# Patient Record
Sex: FEMALE | Race: WHITE | Employment: OTHER | URBAN - METROPOLITAN AREA
[De-identification: names, ages, dates, MRNs, and addresses within clinical notes are randomized per-mention and may not be internally consistent; named-entity substitution may affect disease eponyms.]

---

## 2017-08-08 ENCOUNTER — HOSPITAL ENCOUNTER (OUTPATIENT)
Dept: NON INVASIVE DIAGNOSTICS | Age: 82
Discharge: HOME | End: 2017-08-08
Payer: MEDICARE

## 2017-08-08 VITALS
HEART RATE: 58 BPM | SYSTOLIC BLOOD PRESSURE: 119 MMHG | BODY MASS INDEX: 31.83 KG/M2 | HEIGHT: 68 IN | RESPIRATION RATE: 16 BRPM | OXYGEN SATURATION: 98 % | DIASTOLIC BLOOD PRESSURE: 48 MMHG | WEIGHT: 210 LBS

## 2017-08-08 DIAGNOSIS — I48.91 ATRIAL FIBRILLATION (HCC): ICD-10-CM

## 2017-08-08 LAB
ATRIAL RATE: 57 BPM
CALCULATED P AXIS, ECG09: 77 DEGREES
CALCULATED R AXIS, ECG10: -10 DEGREES
CALCULATED T AXIS, ECG11: 51 DEGREES
DIAGNOSIS, 93000: NORMAL
P-R INTERVAL, ECG05: 204 MS
Q-T INTERVAL, ECG07: 444 MS
QRS DURATION, ECG06: 104 MS
QTC CALCULATION (BEZET), ECG08: 432 MS
VENTRICULAR RATE, ECG03: 57 BPM

## 2017-08-08 PROCEDURE — 92960 CARDIOVERSION ELECTRIC EXT: CPT

## 2017-08-08 PROCEDURE — 99152 MOD SED SAME PHYS/QHP 5/>YRS: CPT

## 2017-08-08 PROCEDURE — 77030018729 HC ELECTRD DEFIB PAD CARD -B

## 2017-08-08 PROCEDURE — 74011250636 HC RX REV CODE- 250/636

## 2017-08-08 PROCEDURE — 99153 MOD SED SAME PHYS/QHP EA: CPT

## 2017-08-08 PROCEDURE — 93005 ELECTROCARDIOGRAM TRACING: CPT

## 2017-08-08 RX ORDER — MIDAZOLAM HYDROCHLORIDE 1 MG/ML
.5-2 INJECTION, SOLUTION INTRAMUSCULAR; INTRAVENOUS
Status: DISCONTINUED | OUTPATIENT
Start: 2017-08-08 | End: 2017-08-08 | Stop reason: ALTCHOICE

## 2017-08-08 RX ORDER — ACETAMINOPHEN 500 MG
2000 TABLET ORAL DAILY
COMMUNITY

## 2017-08-08 RX ORDER — FENTANYL CITRATE 50 UG/ML
25-50 INJECTION, SOLUTION INTRAMUSCULAR; INTRAVENOUS
Status: DISCONTINUED | OUTPATIENT
Start: 2017-08-08 | End: 2017-08-08 | Stop reason: ALTCHOICE

## 2017-08-08 RX ORDER — MIDAZOLAM HYDROCHLORIDE 1 MG/ML
INJECTION, SOLUTION INTRAMUSCULAR; INTRAVENOUS
Status: COMPLETED
Start: 2017-08-08 | End: 2017-08-08

## 2017-08-08 RX ORDER — FENTANYL CITRATE 50 UG/ML
INJECTION, SOLUTION INTRAMUSCULAR; INTRAVENOUS
Status: COMPLETED
Start: 2017-08-08 | End: 2017-08-08

## 2017-08-08 RX ORDER — METOPROLOL TARTRATE 25 MG/1
25 TABLET, FILM COATED ORAL DAILY
COMMUNITY
End: 2017-08-08

## 2017-08-08 RX ADMIN — MIDAZOLAM HYDROCHLORIDE 1 MG: 1 INJECTION, SOLUTION INTRAMUSCULAR; INTRAVENOUS at 10:16

## 2017-08-08 RX ADMIN — FENTANYL CITRATE 50 MCG: 50 INJECTION, SOLUTION INTRAMUSCULAR; INTRAVENOUS at 10:11

## 2017-08-08 RX ADMIN — MIDAZOLAM HYDROCHLORIDE 2 MG: 1 INJECTION, SOLUTION INTRAMUSCULAR; INTRAVENOUS at 10:11

## 2017-08-08 NOTE — PROGRESS NOTES
Patient arrived to Non-Invasive Cardiology Lab for Out Patient Procedure. Staff introduced to patient. Patient identifiers verified with Name and Date of Birth. Procedure verified with patient. Consent forms reviewed and signed by patient or authorized representative and verified. Allergies verified. Patient informed of procedure and plan of care. Questions answered with review. Patient on cardiac monitor, non-invasive blood pressure, SPO2 monitor. On RA. Patient is A&Ox3. Patient reports no complaints. Patient on stretcher, in low position, with side rails up. Patient instructed to call for assistance as needed. Family in waiting room.  ASA 3  Mallampati  3  Per MD

## 2017-08-08 NOTE — IP AVS SNAPSHOT
[address] [facility] 
[phone] Patient: Parvin Valle MRN: [id number] :1929 Current Discharge Medication List  
  
CONTINUE these medications which have NOT CHANGED Dose & Instructions Dispensing Information Comments Morning Noon Evening Bedtime  
 amiodarone 100 mg tablet Commonly known as:  William Velasquez Your last dose was: Your next dose is:    
   
   
 Dose:  100 mg Take 1 Tab by mouth daily. Quantity:  30 Tab Refills:  2  
     
   
   
   
  
 bumetanide 1 mg tablet Commonly known as:  Cony Carry Your last dose was: Your next dose is:    
   
   
 Dose:  1 mg Take 1 Tab by mouth daily. Quantity:  30 Tab Refills:  2  
     
   
   
   
  
 hydrALAZINE 50 mg tablet Commonly known as:  APRESOLINE Your last dose was: Your next dose is:    
   
   
 Dose:  50 mg Take 50 mg by mouth two (2) times a day. Refills:  0 LIPITOR 20 mg tablet Generic drug:  atorvastatin Your last dose was: Your next dose is:    
   
   
 Dose:  40 mg Take 40 mg by mouth nightly. Refills:  0  
     
   
   
   
  
 losartan 100 mg tablet Commonly known as:  COZAAR Your last dose was: Your next dose is:    
   
   
 Dose:  100 mg Take 100 mg by mouth daily. Refills:  0  
     
   
   
   
  
 nitroglycerin 0.4 mg SL tablet Commonly known as:  NITROSTAT Your last dose was: Your next dose is:    
   
   
 Dose:  0.4 mg  
0.4 mg by SubLINGual route every five (5) minutes as needed for Chest Pain. Refills:  0  
     
   
   
   
  
 VITAMIN D3 2,000 unit Cap capsule Generic drug:  Cholecalciferol (Vitamin D3) Your last dose was: Your next dose is:    
   
   
 Dose:  2000 Units Take 2,000 Units by mouth daily. Refills:  0 XARELTO 20 mg Tab tablet Generic drug:  rivaroxaban Your last dose was: Your next dose is:    
   
   
 Dose:  20 mg Take 20 mg by mouth daily. Refills:  0 STOP taking these medications   
 metoprolol tartrate 25 mg tablet Commonly known as:  LOPRESSOR

## 2017-08-08 NOTE — IP AVS SNAPSHOT
[address] [facility] 
[phone] Patient: Guero Adorno MRN: [id number] :1929 You are allergic to the following Allergen Reactions Levaquin In D5w (Levofloxacin In D5w) Hives Anxiety Gabapentin Other (comments) Decreased motor function Recent Documentation Height Weight BMI OB Status Smoking Status 1.727 m 95.3 kg 31.93 kg/m2 Hysterectomy Former Smoker Emergency Contacts Name Discharge Info Relation Home Work Mobile Laurie Saunders [2] [phone] Colette Maza   [phone] About your hospitalization You were admitted on:  2017 You last received care in the:  [facility] NON-INVASIVE CARD You were discharged on:  2017 Unit phone number:  [phone] Why you were hospitalized Your primary diagnosis was:  Not on File Your Primary Care Physician (PCP) Primary Care Physician Office Phone Office Fax Sophie Scott [phone] Follow-up Information Follow up With Details Comments Contact Info MD Lucien Beckham [address] [facility] 
[phone] Current Discharge Medication List  
  
CONTINUE these medications which have NOT CHANGED Dose & Instructions Dispensing Information Comments Morning Noon Evening Bedtime  
 amiodarone 100 mg tablet Commonly known as:  Henny [place] Your last dose was: Your next dose is:    
   
   
 Dose:  100 mg Take 1 Tab by mouth daily. Quantity:  30 Tab Refills:  2  
     
   
   
   
  
 bumetanide 1 mg tablet Commonly known as:  Daivd Pack Your last dose was: Your next dose is:    
   
   
 Dose:  1 mg Take 1 Tab by mouth daily. Quantity:  30 Tab Refills:  2  
     
   
   
   
  
 hydrALAZINE 50 mg tablet Commonly known as:  APRESOLINE Your last dose was: Your next dose is:    
   
   
 Dose:  50 mg Take 50 mg by mouth two (2) times a day. Refills:  0 LIPITOR 20 mg tablet Generic drug:  atorvastatin Your last dose was: Your next dose is:    
   
   
 Dose:  40 mg Take 40 mg by mouth nightly. Refills:  0  
     
   
   
   
  
 losartan 100 mg tablet Commonly known as:  COZAAR Your last dose was: Your next dose is:    
   
   
 Dose:  100 mg Take 100 mg by mouth daily. Refills:  0  
     
   
   
   
  
 nitroglycerin 0.4 mg SL tablet Commonly known as:  NITROSTAT Your last dose was: Your next dose is:    
   
   
 Dose:  0.4 mg  
0.4 mg by SubLINGual route every five (5) minutes as needed for Chest Pain. Refills:  0  
     
   
   
   
  
 VITAMIN D3 2,000 unit Cap capsule Generic drug:  Cholecalciferol (Vitamin D3) Your last dose was: Your next dose is:    
   
   
 Dose:  2000 Units Take 2,000 Units by mouth daily. Refills:  0 XARELTO 20 mg Tab tablet Generic drug:  rivaroxaban Your last dose was: Your next dose is:    
   
   
 Dose:  20 mg Take 20 mg by mouth daily. Refills:  0 STOP taking these medications   
 metoprolol tartrate 25 mg tablet Commonly known as:  LOPRESSOR Discharge Instructions Cardiology Discharge Instructions Patient ID: 
Pam Sessions 
[id number] 
65 y.o. 
12/27/1929 Admit Date: 8/8/2017     Discharge Date: 8/8/2017 Admitting and Discharge Physician: Ousmane Muñoz MD 
 
Admission and Discharge Diagnoses include: 1. Atrial fibrillation, persistent Cardiology Procedures this Admission: 1. Cardioversion, successful Disposition: home Patient Instructions:  
No driving, operating heavy machinery, or signing legal documents today. Stop your metoprolol. FOLLOW-UP:    
 
Call the office [phone] to make an appointment for 4 weeks with Dr. Tamiko Lopez. [address] [facility], [address]    [phone] [place], [address] [facility]    [email or web address] Thank you for placing your trust for your heart with the physicians and staff of [facility]. We have long provided [place] with the most advanced cardiovascular care possible using a personalized and caring approach. And we hope to continue this strong tradition well into the future. A heart condition, or the fear of having a heart condition, can be a stressful time for a patient and their family. There are appointments, testing procedures and unfamiliar terms that can cause natural questions and concerns. While we encourage you to speak with your nurse or physician regarding any questions you might have, please consider this web site as a powerful resource you can use at any time. Often, questions or concerns only arise once you've left our office. There are many useful sections on this web site and links to other professional organizations and advocacy groups. These sources can help answer many questions you might have from the comfort of your own home or office. Again, thank you for considering [facility] as your trusted provider of heart care. Please don't hesitate to let us know if there is anything we can do to enhance your experience with us. Signed: 
Emily Castañeda MD 
8/8/2017 Discharge Orders None Introducing [facility] SERVICES! Lorraine Alarcon introduces [employer] patient portal. Now you can access parts of your medical record, email your doctor's office, and request medication refills online. 1. In your internet browser, go to [email or web address] 2. Click on the First Time User? Click Here link in the Sign In box. You will see the New Member Sign Up page. 3. Enter your [employer] Access Code exactly as it appears below.  You will not need to use this code after youve completed the sign-up process. If you do not sign up before the expiration date, you must request a new code. · [employer] Access Code: [email or web address] Expires: 10/30/2017  6:56 AM 
 
4. Enter the last four digits of your Social Security Number (xxxx) and Date of Birth (mm/dd/yyyy) as indicated and click Submit. You will be taken to the next sign-up page. 5. Create a Dipity ID. This will be your [employer] login ID and cannot be changed, so think of one that is secure and easy to remember. 6. Create a [employer] password. You can change your password at any time. 7. Enter your Password Reset Question and Answer. This can be used at a later time if you forget your password. 8. Enter your e-mail address. You will receive e-mail notification when new information is available in [address] 9. Click Sign Up. You can now view and download portions of your medical record. 10. Click the Download Summary menu link to download a portable copy of your medical information. If you have questions, please visit the Frequently Asked Questions section of the [employer] website. Remember, [employer] is NOT to be used for urgent needs. For medical emergencies, dial 911. Now available from your iPhone and Android! General Information Please provide this summary of care documentation to your next provider. Patient Signature:  ____________________________________________________________ Date:  ____________________________________________________________  
  
Edrie Funmi Provider Signature:  ____________________________________________________________ Date:  ____________________________________________________________

## 2017-08-08 NOTE — PROGRESS NOTES
Pt sedated with 50 mcg Iv Fentanyl and 3 mg Iv Versed for Elective Cardioversion. Pt given 1 synch shock at 360 joules.  Rhythm converted to sinus bradycardia

## 2017-08-08 NOTE — PROCEDURES
[facility], [address] [place] [address]  [phone]    Patient ID:  Patient: Parvin Valle  MRN: [id number]  Age: 80 y.o.  : 1929  Gender: female  Study Date: 2017    History: This is a female with symptomatic persistent atrial fibrillation here for cardioversion. She has been on anticoagulation with Xarelto for at least a month and amiodarone for at least a month for antiarrhythmic therapy. Procedure:  Electrical Cardioversion (38076)  The patient was brought to the noninvasive lab in a postabsorptive state after informed consent had been previously obtained. Continuous electrocardiographic and hemodynamic monitoring was performed. Sedation was provided using nurse using Versed and fentanyl who was in constant attendance throughout the procedure. Sedation time 10 minutes. Shock pads were placed in the left anterior-right posterior or anterior-apical position and R wave synchronized shock energy was delivered. The patient was recovered, with satisfactory hemodynamics, no immediate complication noted. Preoperative Diagnosis: As above. Postoperative Diagnosis: As above. Procedure:  As above. Surgeon(s) and Role:  Kayla Mcdaniel MD - Primary   Anesthesia:   MAC. Estimated Blood Loss:  <5 cc. Specimens: * No specimens in log *   Findings:  As below. Complications:  None. Findings:  1. Baseline atrial fibrillation. 2.  Successful cardioversion using 360J shock energy to restore sinus rhythm. Recommendations: Follow-up in 4-6 weeks in the office with Dr. Yvette Samayoa.     Signed:  Kayla Mcdaniel MD

## 2017-08-08 NOTE — DISCHARGE INSTRUCTIONS
Cardiology Discharge Instructions             Patient ID:  Raquel Randolph  [id number]  71 y.o.  12/27/1929    Admit Date: 8/8/2017     Discharge Date: 8/8/2017   Admitting and Discharge Physician: Vu Hopper MD    Admission and Discharge Diagnoses include:  1. Atrial fibrillation, persistent    Cardiology Procedures this Admission:  1. Cardioversion, successful    Disposition: home    Patient Instructions:   No driving, operating heavy machinery, or signing legal documents today. Stop your metoprolol. FOLLOW-UP:       Call the office [phone] to make an appointment for 4 weeks with Dr. Elsie Loyd. [address] [facility], [address]    [phone]  [place], [address] [facility]    [email or web address]    Thank you for placing your trust for your heart with the physicians and staff of [facility]. We have long provided [place] with the most advanced cardiovascular care possible using a personalized and caring approach. And we hope to continue this strong tradition well into the future. A heart condition, or the fear of having a heart condition, can be a stressful time for a patient and their family. There are appointments, testing procedures and unfamiliar terms that can cause natural questions and concerns. While we encourage you to speak with your nurse or physician regarding any questions you might have, please consider this web site as a powerful resource you can use at any time. Often, questions or concerns only arise once you've left our office. There are many useful sections on this web site and links to other professional organizations and advocacy groups. These sources can help answer many questions you might have from the comfort of your own home or office. Again, thank you for considering [facility] as your trusted provider of heart care. Please don't hesitate to let us know if there is anything we can do to enhance your experience with us.      Signed:  Vu Hopper MD  8/8/2017

## 2021-05-15 ENCOUNTER — APPOINTMENT (OUTPATIENT)
Dept: GENERAL RADIOLOGY | Age: 86
End: 2021-05-15
Attending: EMERGENCY MEDICINE
Payer: MEDICARE

## 2021-05-15 ENCOUNTER — HOSPITAL ENCOUNTER (EMERGENCY)
Age: 86
Discharge: HOME | End: 2021-05-16
Attending: EMERGENCY MEDICINE
Payer: MEDICARE

## 2021-05-15 ENCOUNTER — APPOINTMENT (OUTPATIENT)
Dept: CT IMAGING | Age: 86
End: 2021-05-15
Attending: EMERGENCY MEDICINE
Payer: MEDICARE

## 2021-05-15 VITALS
TEMPERATURE: 97.8 F | SYSTOLIC BLOOD PRESSURE: 133 MMHG | DIASTOLIC BLOOD PRESSURE: 69 MMHG | RESPIRATION RATE: 16 BRPM | HEART RATE: 83 BPM | HEIGHT: 68 IN | BODY MASS INDEX: 28.79 KG/M2 | OXYGEN SATURATION: 98 % | WEIGHT: 190 LBS

## 2021-05-15 DIAGNOSIS — R53.1 WEAKNESS: Primary | ICD-10-CM
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
## 2021-05-15 DIAGNOSIS — S05.11XA CONTUSION OF RIGHT ORBITAL TISSUES, INITIAL ENCOUNTER: ICD-10-CM

## 2021-05-15 LAB
BASOPHILS # BLD: 0 K/UL (ref 0–0.1)
BASOPHILS NFR BLD: 1 % (ref 0–1)
DIFFERENTIAL METHOD BLD: ABNORMAL
EOSINOPHIL # BLD: 0.2 K/UL (ref 0–0.4)
EOSINOPHIL NFR BLD: 3 % (ref 0–7)
ERYTHROCYTE [DISTWIDTH] IN BLOOD BY AUTOMATED COUNT: 13.2 % (ref 11.5–14.5)
HCT VFR BLD AUTO: 36.2 % (ref 35–47)
HGB BLD-MCNC: 11.5 G/DL (ref 11.5–16)
IMM GRANULOCYTES # BLD AUTO: 0 K/UL (ref 0–0.04)
IMM GRANULOCYTES NFR BLD AUTO: 0 % (ref 0–0.5)
LYMPHOCYTES # BLD: 2.2 K/UL (ref 0.8–3.5)
LYMPHOCYTES NFR BLD: 28 % (ref 12–49)
MCH RBC QN AUTO: 30.7 PG (ref 26–34)
MCHC RBC AUTO-ENTMCNC: 31.8 G/DL (ref 30–36.5)
MCV RBC AUTO: 96.5 FL (ref 80–99)
MONOCYTES # BLD: 0.9 K/UL (ref 0–1)
MONOCYTES NFR BLD: 12 % (ref 5–13)
NEUTS SEG # BLD: 4.5 K/UL (ref 1.8–8)
NEUTS SEG NFR BLD: 56 % (ref 32–75)
NRBC # BLD: 0 K/UL (ref 0–0.01)
NRBC BLD-RTO: 0 PER 100 WBC
PLATELET # BLD AUTO: 225 K/UL (ref 150–400)
PMV BLD AUTO: 10.9 FL (ref 8.9–12.9)
RBC # BLD AUTO: 3.75 M/UL (ref 3.8–5.2)
WBC # BLD AUTO: 7.9 K/UL (ref 3.6–11)

## 2021-05-15 PROCEDURE — 85025 COMPLETE CBC W/AUTO DIFF WBC: CPT

## 2021-05-15 PROCEDURE — 99284 EMERGENCY DEPT VISIT MOD MDM: CPT

## 2021-05-15 PROCEDURE — 72125 CT NECK SPINE W/O DYE: CPT

## 2021-05-15 PROCEDURE — 70450 CT HEAD/BRAIN W/O DYE: CPT

## 2021-05-15 PROCEDURE — 70486 CT MAXILLOFACIAL W/O DYE: CPT

## 2021-05-15 PROCEDURE — 71045 X-RAY EXAM CHEST 1 VIEW: CPT

## 2021-05-15 PROCEDURE — 80053 COMPREHEN METABOLIC PANEL: CPT

## 2021-05-15 PROCEDURE — 36415 COLL VENOUS BLD VENIPUNCTURE: CPT

## 2021-05-16 LAB
ALBUMIN SERPL-MCNC: 2.6 G/DL (ref 3.5–5)
ALBUMIN/GLOB SERPL: 0.6 {RATIO} (ref 1.1–2.2)
ALP SERPL-CCNC: 88 U/L (ref 45–117)
ALT SERPL-CCNC: 17 U/L (ref 12–78)
ANION GAP SERPL CALC-SCNC: 3 MMOL/L (ref 5–15)
APPEARANCE UR: CLEAR
AST SERPL-CCNC: 22 U/L (ref 15–37)
ATRIAL RATE: 61 BPM
BACTERIA URNS QL MICRO: NEGATIVE /HPF
BILIRUB SERPL-MCNC: 0.2 MG/DL (ref 0.2–1)
BILIRUB UR QL: NEGATIVE
BUN SERPL-MCNC: 20 MG/DL (ref 6–20)
BUN/CREAT SERPL: 14 (ref 12–20)
CALCIUM SERPL-MCNC: 9.7 MG/DL (ref 8.5–10.1)
CALCULATED R AXIS, ECG10: 14 DEGREES
CALCULATED T AXIS, ECG11: 44 DEGREES
CHLORIDE SERPL-SCNC: 106 MMOL/L (ref 97–108)
CO2 SERPL-SCNC: 29 MMOL/L (ref 21–32)
COLOR UR: ABNORMAL
CREAT SERPL-MCNC: 1.44 MG/DL (ref 0.55–1.02)
DIAGNOSIS, 93000: NORMAL
EPITH CASTS URNS QL MICRO: ABNORMAL /LPF
GLOBULIN SER CALC-MCNC: 4.1 G/DL (ref 2–4)
GLUCOSE SERPL-MCNC: 95 MG/DL (ref 65–100)
GLUCOSE UR STRIP.AUTO-MCNC: NEGATIVE MG/DL
HGB UR QL STRIP: NEGATIVE
KETONES UR QL STRIP.AUTO: NEGATIVE MG/DL
LEUKOCYTE ESTERASE UR QL STRIP.AUTO: NEGATIVE
NITRITE UR QL STRIP.AUTO: NEGATIVE
PH UR STRIP: 7 [PH] (ref 5–8)
POTASSIUM SERPL-SCNC: 4 MMOL/L (ref 3.5–5.1)
PROT SERPL-MCNC: 6.7 G/DL (ref 6.4–8.2)
PROT UR STRIP-MCNC: NEGATIVE MG/DL
Q-T INTERVAL, ECG07: 418 MS
QRS DURATION, ECG06: 92 MS
QTC CALCULATION (BEZET), ECG08: 454 MS
RBC #/AREA URNS HPF: ABNORMAL /HPF (ref 0–5)
SODIUM SERPL-SCNC: 138 MMOL/L (ref 136–145)
SP GR UR REFRACTOMETRY: 1.01 (ref 1–1.03)
UA: UC IF INDICATED,UAUC: ABNORMAL
UROBILINOGEN UR QL STRIP.AUTO: 0.2 EU/DL (ref 0.2–1)
VENTRICULAR RATE, ECG03: 71 BPM
WBC URNS QL MICRO: ABNORMAL /HPF (ref 0–4)

## 2021-05-16 PROCEDURE — 81001 URINALYSIS AUTO W/SCOPE: CPT

## 2021-05-16 PROCEDURE — 93005 ELECTROCARDIOGRAM TRACING: CPT

## 2021-05-16 NOTE — ED PROVIDER NOTES
EMERGENCY DEPARTMENT HISTORY AND PHYSICAL EXAM    Please note that this dictation was completed with [employer], the computer voice recognition software. Quite often unanticipated grammatical, syntax, homophones, and other interpretive errors are inadvertently transcribed by the computer software. Please disregard these errors. Please excuse any errors that have escaped final proofreading. Date: 5/15/2021  Patient Name: Marcelina Moreira  Patient Age and Sex: 80 y.o. female    History of Presenting Illness     Chief Complaint   Patient presents with    Fall     Pt arrived to ED from home , had GLF this am at 09:00. Pt then had another fall tonight. Pt has black R eye. History Provided By: Patient    HPI: Marcelina Moreira, is a 80 y.o. female whose medical history is noted below and includes afib (on xeralto), HTN, cad, CHF, prior cva, presents to the ED from home after 2 ground-level nonsyncopal falls that occurred today. First 1 was this morning around 9 AM, the second fall was this evening. Patient normally gets around with a walker, states that for the past 2 days her legs have been \"shaky\" and buckled from underneath of her, causing the fall. No lightheadedness or syncope. During last fall she hit her head on a wall, denies any headache, loss of consciousness or confusion thereafter. Arrives in the emergency department alert and oriented, denies any pain, recent illness, shortness of breath, chest pain or any other symptoms. Diagnosed with UTI few days ago, on last two days of abx. No dysuria or other urinary symptoms. Has not taken her fluid pill in a week because doctor told her not to take it with the abx (on bumex 1mg). No sob, orthopnea, worsening LE edema. Will resume bumex in the am.      Pt denies any other alleviating or exacerbating factors. No other associated signs or symptoms. There are no other complaints, changes or physical findings at this time.      PCP: Eloisa Blancas MD    Past History   All documented elements of the [address] reviewed and verified by me. -Alyssia Roman MD    Past Medical History:  Past Medical History:   Diagnosis Date    Arrhythmia     atrial fibrillation 2017    Arthritis     CAD (coronary artery disease)     MI    CKD (chronic kidney disease) stage 3    High cholesterol     Hypertension     Other ill-defined conditions     high cholesterol    Stroke [facility])        Past Surgical History:  Past Surgical History:   Procedure Laterality Date    CARDIAC SURG PROCEDURE UNLIST      stents x 3    HX GYN      hysterectomy    HX HEENT      tonsilectomy    HX ORTHOPAEDIC      back surgery, R knee replacement, neck surgery    HX ORTHOPAEDIC      L rotator cuff    HX OTHER SURGICAL      Veins stripped bilaterally       Family History:  Family History   Problem Relation Age of Onset    Cancer Mother        Social History:  Social History     Tobacco Use    Smoking status: Former Smoker    Smokeless tobacco: Never Used   Substance Use Topics    Alcohol use: No     Comment: occasional    Drug use: No       Allergies: Allergies   Allergen Reactions    Levaquin In D5w [Levofloxacin In D5w] Hives and Anxiety    Gabapentin Other (comments)     Decreased motor function        Review of Systems   All other systems reviewed and negative    Review of Systems   Constitutional: Negative for appetite change and fever. HENT: Positive for facial swelling. Negative for nosebleeds and trouble swallowing. Eyes: Positive for redness. Negative for photophobia and visual disturbance. Respiratory: Negative for cough and shortness of breath. Cardiovascular: Positive for leg swelling (Chronic). Negative for chest pain and palpitations. Gastrointestinal: Negative for abdominal pain, diarrhea, nausea and vomiting. Endocrine: Negative. Genitourinary: Negative for dysuria, flank pain and hematuria.    Musculoskeletal: Negative for back pain, neck pain and neck stiffness. Skin: Negative. Negative for pallor and wound. Neurological: Negative for syncope, light-headedness and headaches. Hematological: Negative for adenopathy. Bruises/bleeds easily. All other systems reviewed and are negative. Physical Exam   Reviewed patients vital signs and nursing note    Physical Exam  Vitals signs and nursing note reviewed. Constitutional:       Appearance: She is not diaphoretic. HENT:      Head: Normocephalic. Contusion and right periorbital erythema present. Jaw: There is normal jaw occlusion. No tenderness or swelling. Right Ear: Tympanic membrane and ear canal normal.      Left Ear: Tympanic membrane and ear canal normal.      Mouth/Throat:      Mouth: Mucous membranes are moist.   Eyes:      General: Vision grossly intact. Gaze aligned appropriately. No visual field deficit or scleral icterus. Extraocular Movements: Extraocular movements intact. Conjunctiva/sclera: Conjunctivae normal.      Pupils: Pupils are equal, round, and reactive to light. Neck:      Musculoskeletal: Full passive range of motion without pain, normal range of motion and neck supple. Cardiovascular:      Rate and Rhythm: Normal rate and regular rhythm. Pulses: Normal pulses. Heart sounds: Normal heart sounds. Pulmonary:      Effort: Pulmonary effort is normal.      Breath sounds: Normal breath sounds. Abdominal:      General: Bowel sounds are normal. There is no distension. Palpations: Abdomen is soft. Tenderness: There is no abdominal tenderness. Musculoskeletal: Normal range of motion. Right lower leg: Edema present. Left lower leg: Edema present. Skin:     General: Skin is warm and dry. Capillary Refill: Capillary refill takes less than 2 seconds. Neurological:      General: No focal deficit present. Mental Status: She is alert and oriented to person, place, and time.    Psychiatric:         Mood and Affect: Mood normal.         Behavior: Behavior normal.         Diagnostic Study Results     Labs - I have personally reviewed and interpreted all laboratory results. Brandy Philippe MD, MSc  Recent Results (from the past 24 hour(s))   CBC WITH AUTOMATED DIFF    Collection Time: 05/15/21 11:27 PM   Result Value Ref Range    WBC 7.9 3.6 - 11.0 K/uL    RBC 3.75 (L) 3.80 - 5.20 M/uL    HGB 11.5 11.5 - 16.0 g/dL    HCT 36.2 35.0 - 47.0 %    MCV 96.5 80.0 - 99.0 FL    MCH 30.7 26.0 - 34.0 PG    MCHC 31.8 30.0 - 36.5 g/dL    RDW 13.2 11.5 - 14.5 %    PLATELET 239 438 - 877 K/uL    MPV 10.9 8.9 - 12.9 FL    NRBC 0.0 0  WBC    ABSOLUTE NRBC 0.00 0.00 - 0.01 K/uL    NEUTROPHILS 56 32 - 75 %    LYMPHOCYTES 28 12 - 49 %    MONOCYTES 12 5 - 13 %    EOSINOPHILS 3 0 - 7 %    BASOPHILS 1 0 - 1 %    IMMATURE GRANULOCYTES 0 0.0 - 0.5 %    ABS. NEUTROPHILS 4.5 1.8 - 8.0 K/UL    ABS. LYMPHOCYTES 2.2 0.8 - 3.5 K/UL    ABS. MONOCYTES 0.9 0.0 - 1.0 K/UL    ABS. EOSINOPHILS 0.2 0.0 - 0.4 K/UL    ABS. BASOPHILS 0.0 0.0 - 0.1 K/UL    ABS. IMM. GRANS. 0.0 0.00 - 0.04 K/UL    DF AUTOMATED     METABOLIC PANEL, COMPREHENSIVE    Collection Time: 05/15/21 11:27 PM   Result Value Ref Range    Sodium 138 136 - 145 mmol/L    Potassium 4.0 3.5 - 5.1 mmol/L    Chloride 106 97 - 108 mmol/L    CO2 29 21 - 32 mmol/L    Anion gap 3 (L) 5 - 15 mmol/L    Glucose 95 65 - 100 mg/dL    BUN 20 6 - 20 MG/DL    Creatinine 1.44 (H) 0.55 - 1.02 MG/DL    BUN/Creatinine ratio 14 12 - 20      GFR est AA 41 (L) >60 ml/min/1.73m2    GFR est non-AA 34 (L) >60 ml/min/1.73m2    Calcium 9.7 8.5 - 10.1 MG/DL    Bilirubin, total 0.2 0.2 - 1.0 MG/DL    ALT (SGPT) 17 12 - 78 U/L    AST (SGOT) 22 15 - 37 U/L    Alk.  phosphatase 88 45 - 117 U/L    Protein, total 6.7 6.4 - 8.2 g/dL    Albumin 2.6 (L) 3.5 - 5.0 g/dL    Globulin 4.1 (H) 2.0 - 4.0 g/dL    A-G Ratio 0.6 (L) 1.1 - 2.2     EKG, 12 LEAD, INITIAL    Collection Time: 05/16/21 12:39 AM   Result Value Ref Range    Ventricular Rate 71 BPM    Atrial Rate 61 BPM    QRS Duration 92 ms    Q-T Interval 418 ms    QTC Calculation (Bezet) 454 ms    Calculated R Axis 14 degrees    Calculated T Axis 44 degrees    Diagnosis       Atrial fibrillation  When compared with ECG of 08-AUG-2017 10:27,  Atrial fibrillation has replaced Sinus rhythm  Nonspecific T wave abnormality no longer evident in Inferior leads  Nonspecific T wave abnormality, improved in Lateral leads     URINALYSIS W/ REFLEX CULTURE    Collection Time: 05/16/21  2:02 AM    Specimen: Urine   Result Value Ref Range    Color YELLOW/STRAW      Appearance CLEAR CLEAR      Specific gravity 1.007 1.003 - 1.030      pH (UA) 7.0 5.0 - 8.0      Protein Negative NEG mg/dL    Glucose Negative NEG mg/dL    Ketone Negative NEG mg/dL    Bilirubin Negative NEG      Blood Negative NEG      Urobilinogen 0.2 0.2 - 1.0 EU/dL    Nitrites Negative NEG      Leukocyte Esterase Negative NEG      WBC 0-4 0 - 4 /hpf    RBC 0-5 0 - 5 /hpf    Epithelial cells MODERATE (A) FEW /lpf    Bacteria Negative NEG /hpf    UA:UC IF INDICATED CULTURE NOT INDICATED BY UA RESULT CNI         Radiologic Studies - I have personally reviewed and interpreted all imaging studies and agree with radiology interpretation and report. Yovany Hu MD, MSc  CT HEAD WO CONT   Final Result    impression: No acute intracranial findings. CT MAXILLOFACIAL WO CONT   Final Result   No acute changes. .      CT SPINE CERV WO CONT   Final Result   Post surgical and degenerative changes, no acute fracture. XR CHEST PORT   Final Result   No acute changes. Medical Decision Making   I am the first provider for this patient. Records Reviewed: I reviewed our electronic medical record system for any past medical records that were available that may contribute to the patient's current condition, including their PMH, surgical history, social and family history. Reviewed the nursing notes and vital signs from today's visit.  Nursing notes will be reviewed as they become available in realtime while the pt has been in the ED. In addition, I read most recent discharge summaries, if available and reviewed prior ECGs or imaging studies for comparison purposes. Alec Freire MD Msc    Vital Signs-Reviewed the patient's vital signs. Patient Vitals for the past 24 hrs:   Temp Pulse Resp BP SpO2   05/15/21 2159 97.8 °F (36.6 °C) 83 16 133/69 98 %       ECG interpretation: atrial fibrillation with rate 71, normal intervals, no ST elevations, depressions or other changes concerning for acute ischemia. This ECG has been viewed and interpreted by me personally. Alec Freire MD, Msc    Provider Notes (Medical Decision Making):     55-year-old female presents to the emergency department after two ground-level nonsyncopal falls that occurred today. Normal vital signs in the emergency room. On exam, she has a large contusion over the right side of her face, no significant tenderness or orbital injury. Rest of the exam without focal evidence of trauma. Unclear etiology of the falls, high description of feeling \"shaky\" opens up larger differential including metabolic abnormality, underlying infection, general deconditioning. Cardiac etiology unlikely as she describes no history of palpitations, lightheadedness, syncope, cp or sob. Plan: cbc, metabolic panel, UA. ED Course:   Initial assessment performed. The patients presenting problems have been discussed, and they are in agreement with the care plan formulated and outlined with them. I have encouraged them to ask questions as they arise throughout their visit. Progress note:  Patient has been reassessed and reports feeling considerably better, has normal vital signs and feels comfortable going home. I think this is reasonable as no findings today suggest a life-threatening condition. Ambulated patient with walker, she was able to get around the room without assistance.  She continues to feel week, however. Involved patient and her granddaughter in joint decision-making in regard to disposition. Patient feels comfortable going home, her daughter lives with her and is able to provide her 24-hour care. She is interested and would benefit from physical therapy, I have left a consultation for physical therapy to give them a call and provide them with resources if possible. Also advised the family to ask her primary care doctor for referral for the same. DISPOSITION: DISCHARGE  The patient's results have been reviewed with patient and available family and/or caregiver. They verbally convey their understanding and agreement of the patient's signs, symptoms, diagnosis, treatment and prognosis and additionally agree to follow up as recommended in the discharge instructions or to return to the Emergency Department should the patient's condition change prior to their follow-up appointment. The patient and available family and/or caregiver verbally agree with the care plan and all of their questions have been answered. The discharge instructions have also been provided to the them with educational information regarding the patient's diagnosis as well a list of reasons why the patient would want to return to the ER prior to their follow-up appointment should any concerns arise, the patient's condition change or symptoms worsen. Subha Razo MD, Msc    PLAN:  Current Discharge Medication List      1.   2.     Follow-up Information     Follow up With Specialties Details Why Contact Info    Angeline Iniguez MD Family Medicine Schedule an appointment as soon as possible for a visit   [address]  [address] [address] [phone]      [facility] EMERGENCY DEPT Emergency Medicine  As needed, If symptoms worsen [address]  [address]  [phone]        3. Return to ED if worse         Tayler MORA MD, am the attending of record for this patient encounter.     Diagnosis Clinical Impression:   1. Weakness    2. Fall, initial encounter    3. Contusion of right orbital tissues, initial encounter        Attestation:  I personally performed the services described in this documentation on this date 5/15/2021 for patient Coreen Sanders.   Alec Freire MD

## 2021-05-16 NOTE — ED NOTES
Pt came to ER for fall at home. Pt lives with her daughter who is LPN. Pt has bruising on her right eye due to the fall. Pt examined by Dr. Adelita Avendaño. Ct and x-ray are not significant. Pt able to ambulate at bedside with a use of walker. I have reviewed discharge instructions with the patient. The patient verbalized understanding.

## 2021-05-16 NOTE — DISCHARGE INSTRUCTIONS
It was a pleasure taking care of you in our Emergency Department today. We know that when you come to [facility], you are entrusting us with your health, comfort, and safety. Our physicians and nurses honor that trust, and truly appreciate the opportunity to care for you and your loved ones. We also value your feedback. If you receive a survey about your Emergency Department experience today, please fill it out. We care about our patients' feedback, and we listen to what you have to say. Thank you!

## 2021-05-16 NOTE — ED NOTES
Bedside shift change report given to Geovanni  (oncoming nurse) by Gorge Mujica (offgoing nurse). Report included the following information SBAR, Kardex, ED Summary, [facility] and Recent Results.

## 2021-05-17 ENCOUNTER — TELEPHONE (OUTPATIENT)
Dept: CASE MANAGEMENT | Age: 86
End: 2021-05-17

## 2021-05-17 NOTE — TELEPHONE ENCOUNTER
CM spoke with patient's daughter Dar Rodriguez. Ms. Rizwana Chawla stated that patient has had physical therapy via [facility] last year. Ms. Rizwana Chawla stated that she will reach out to [facility] as well as patient's PCP to coordinate starting service. Ms. Rizwana Chawla thanked CM for following up with her. No other needs identified at this time.     Estefany Howard, NIKN, RN, BSW   RN Care Manager

## 2021-05-17 NOTE — TELEPHONE ENCOUNTER
This CM received consult for possible home health PT set up for patient seen and discharged from ED. CM placed call to patient's daughter Lizabeth Amato who stated that she is patient's POA, but patient lives with her other daughter Melvi Carlos. Ms. Tre Garcia directed CM to call Azalia Ley directly to speak to her about [facility]. CM placed call to Ms. Melvi Carlos and left secure VM. CM awaiting call back.       Juan Jose Reyes, NIKN, RN, BSW   RN Care Manager

## 2021-08-09 ENCOUNTER — APPOINTMENT (OUTPATIENT)
Dept: GENERAL RADIOLOGY | Age: 86
DRG: 291 | End: 2021-08-09
Attending: EMERGENCY MEDICINE
Payer: MEDICARE

## 2021-08-09 ENCOUNTER — HOSPITAL ENCOUNTER (INPATIENT)
Age: 86
LOS: 3 days | Discharge: NURSING, REHAB OR LONG-TERM CARE | DRG: 291 | End: 2021-08-13
Attending: EMERGENCY MEDICINE | Admitting: INTERNAL MEDICINE
Payer: MEDICARE
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
## 2021-08-09 DIAGNOSIS — R53.1 WEAKNESS: ICD-10-CM

## 2021-08-09 DIAGNOSIS — I50.9 ACUTE ON CHRONIC CONGESTIVE HEART FAILURE, UNSPECIFIED HEART FAILURE TYPE (HCC): Primary | ICD-10-CM

## 2021-08-09 LAB
ALBUMIN SERPL-MCNC: 2.4 G/DL (ref 3.5–5)
ALBUMIN/GLOB SERPL: 0.5 {RATIO} (ref 1.1–2.2)
ALP SERPL-CCNC: 83 U/L (ref 45–117)
ALT SERPL-CCNC: 16 U/L (ref 12–78)
ANION GAP SERPL CALC-SCNC: 5 MMOL/L (ref 5–15)
APPEARANCE UR: CLEAR
AST SERPL-CCNC: 21 U/L (ref 15–37)
BACTERIA URNS QL MICRO: NEGATIVE /HPF
BASOPHILS # BLD: 0.1 K/UL (ref 0–0.1)
BASOPHILS NFR BLD: 0 % (ref 0–1)
BILIRUB SERPL-MCNC: 0.7 MG/DL (ref 0.2–1)
BILIRUB UR QL: NEGATIVE
BNP SERPL-MCNC: 7583 PG/ML
BUN SERPL-MCNC: 16 MG/DL (ref 6–20)
BUN/CREAT SERPL: 16 (ref 12–20)
CALCIUM SERPL-MCNC: 9.1 MG/DL (ref 8.5–10.1)
CHLORIDE SERPL-SCNC: 107 MMOL/L (ref 97–108)
CO2 SERPL-SCNC: 24 MMOL/L (ref 21–32)
COLOR UR: ABNORMAL
CREAT SERPL-MCNC: 0.99 MG/DL (ref 0.55–1.02)
DIFFERENTIAL METHOD BLD: ABNORMAL
EOSINOPHIL # BLD: 0 K/UL (ref 0–0.4)
EOSINOPHIL NFR BLD: 0 % (ref 0–7)
EPITH CASTS URNS QL MICRO: ABNORMAL /LPF
ERYTHROCYTE [DISTWIDTH] IN BLOOD BY AUTOMATED COUNT: 13.2 % (ref 11.5–14.5)
GLOBULIN SER CALC-MCNC: 4.5 G/DL (ref 2–4)
GLUCOSE SERPL-MCNC: 129 MG/DL (ref 65–100)
GLUCOSE UR STRIP.AUTO-MCNC: NEGATIVE MG/DL
HCT VFR BLD AUTO: 35.6 % (ref 35–47)
HGB BLD-MCNC: 11.5 G/DL (ref 11.5–16)
HGB UR QL STRIP: NEGATIVE
HYALINE CASTS URNS QL MICRO: ABNORMAL /LPF (ref 0–5)
IMM GRANULOCYTES # BLD AUTO: 0 K/UL (ref 0–0.04)
IMM GRANULOCYTES NFR BLD AUTO: 0 % (ref 0–0.5)
KETONES UR QL STRIP.AUTO: NEGATIVE MG/DL
LEUKOCYTE ESTERASE UR QL STRIP.AUTO: NEGATIVE
LYMPHOCYTES # BLD: 2 K/UL (ref 0.8–3.5)
LYMPHOCYTES NFR BLD: 18 % (ref 12–49)
MCH RBC QN AUTO: 30.6 PG (ref 26–34)
MCHC RBC AUTO-ENTMCNC: 32.3 G/DL (ref 30–36.5)
MCV RBC AUTO: 94.7 FL (ref 80–99)
MONOCYTES # BLD: 1.6 K/UL (ref 0–1)
MONOCYTES NFR BLD: 14 % (ref 5–13)
NEUTS SEG # BLD: 7.6 K/UL (ref 1.8–8)
NEUTS SEG NFR BLD: 68 % (ref 32–75)
NITRITE UR QL STRIP.AUTO: NEGATIVE
NRBC # BLD: 0 K/UL (ref 0–0.01)
NRBC BLD-RTO: 0 PER 100 WBC
PH UR STRIP: 6 [PH] (ref 5–8)
PLATELET # BLD AUTO: 232 K/UL (ref 150–400)
PMV BLD AUTO: 10.1 FL (ref 8.9–12.9)
POTASSIUM SERPL-SCNC: 4 MMOL/L (ref 3.5–5.1)
PROT SERPL-MCNC: 6.9 G/DL (ref 6.4–8.2)
PROT UR STRIP-MCNC: 30 MG/DL
RBC # BLD AUTO: 3.76 M/UL (ref 3.8–5.2)
RBC #/AREA URNS HPF: ABNORMAL /HPF (ref 0–5)
SODIUM SERPL-SCNC: 136 MMOL/L (ref 136–145)
SP GR UR REFRACTOMETRY: 1.02 (ref 1–1.03)
TROPONIN I SERPL-MCNC: 0.07 NG/ML
UA: UC IF INDICATED,UAUC: ABNORMAL
UROBILINOGEN UR QL STRIP.AUTO: 1 EU/DL (ref 0.2–1)
WBC # BLD AUTO: 11.3 K/UL (ref 3.6–11)
WBC URNS QL MICRO: ABNORMAL /HPF (ref 0–4)

## 2021-08-09 PROCEDURE — 84484 ASSAY OF TROPONIN QUANT: CPT

## 2021-08-09 PROCEDURE — 83880 ASSAY OF NATRIURETIC PEPTIDE: CPT

## 2021-08-09 PROCEDURE — 71045 X-RAY EXAM CHEST 1 VIEW: CPT

## 2021-08-09 PROCEDURE — 85025 COMPLETE CBC W/AUTO DIFF WBC: CPT

## 2021-08-09 PROCEDURE — 96374 THER/PROPH/DIAG INJ IV PUSH: CPT

## 2021-08-09 PROCEDURE — 93005 ELECTROCARDIOGRAM TRACING: CPT

## 2021-08-09 PROCEDURE — 74011250637 HC RX REV CODE- 250/637: Performed by: EMERGENCY MEDICINE

## 2021-08-09 PROCEDURE — 99285 EMERGENCY DEPT VISIT HI MDM: CPT

## 2021-08-09 PROCEDURE — 80053 COMPREHEN METABOLIC PANEL: CPT

## 2021-08-09 PROCEDURE — 81001 URINALYSIS AUTO W/SCOPE: CPT

## 2021-08-09 PROCEDURE — 74011000250 HC RX REV CODE- 250: Performed by: EMERGENCY MEDICINE

## 2021-08-09 PROCEDURE — 36415 COLL VENOUS BLD VENIPUNCTURE: CPT

## 2021-08-09 RX ORDER — ACETAMINOPHEN 500 MG
1000 TABLET ORAL ONCE
Status: COMPLETED | OUTPATIENT
Start: 2021-08-09 | End: 2021-08-09

## 2021-08-09 RX ORDER — BUMETANIDE 0.25 MG/ML
1 INJECTION INTRAMUSCULAR; INTRAVENOUS ONCE
Status: COMPLETED | OUTPATIENT
Start: 2021-08-09 | End: 2021-08-09

## 2021-08-09 RX ADMIN — ACETAMINOPHEN 1000 MG: 500 TABLET ORAL at 20:14

## 2021-08-09 RX ADMIN — BUMETANIDE 1 MG: 0.25 INJECTION, SOLUTION INTRAMUSCULAR; INTRAVENOUS at 21:08

## 2021-08-09 NOTE — ED PROVIDER NOTES
EMERGENCY DEPARTMENT HISTORY AND PHYSICAL EXAM      Date: 8/9/2021  Patient Name: Darian Rivero    History of Presenting Illness     Chief Complaint   Patient presents with    Lethargy     per EMS per family pt has been weaker than normal for 2 days    Urinary Incontinence     per family pt's urine is darker and more malodours than usual    Leg Swelling     pt with 4+ edema to bilateral lower extremities, has not taken her bumex as prescribed for several days per family per EMS due to incontinence issues       History Provided By: Patient    HPI: Darian Rivero, 80 y.o. female  With past medical history of hypertension, CAD, CKD, CHF presenting today with generalized weakness and malodorous urine. The patient says that she has been feeling weak for the past 2 days since Saturday. She says that she has been having trouble ambulating today and typically is able to get around with a walker. She says that the urine is darker and more malodorous than usual.  She also is having increased edema because she is not wanting to take her Bumex because she has incontinence related to using Bumex. She denies any chest pain or shortness of breath. She says she has a mild cough that is unchanged from her chronic cough. No fevers. She is not vaccinated for Covid. She has had a fall today which is typical for her where she slid down to the ground but denies any injury from this fall. There are no other complaints, changes, or physical findings at this time. PCP: Naveen Lubin MD    No current facility-administered medications on file prior to encounter. Current Outpatient Medications on File Prior to Encounter   Medication Sig Dispense Refill    rivaroxaban (XARELTO) 20 mg tab tablet Take 20 mg by mouth daily. Cholecalciferol, Vitamin D3, (VITAMIN D3) 2,000 unit cap capsule Take 2,000 Units by mouth daily. bumetanide (BUMEX) 1 mg tablet Take 1 Tab by mouth daily.  30 Tab 2    amiodarone (PACERONE) 100 mg tablet Take 1 Tab by mouth daily. 30 Tab 2    hydrALAZINE (APRESOLINE) 50 mg tablet Take 50 mg by mouth two (2) times a day. nitroglycerin (NITROSTAT) 0.4 mg SL tablet 0.4 mg by SubLINGual route every five (5) minutes as needed for Chest Pain.      losartan (COZAAR) 100 mg tablet Take 100 mg by mouth daily. atorvastatin (LIPITOR) 20 mg tablet Take 40 mg by mouth nightly. Past History     Past Medical History:  Past Medical History:   Diagnosis Date    Arrhythmia     atrial fibrillation 2017    Arthritis     CAD (coronary artery disease)     MI    CKD (chronic kidney disease) stage 3    High cholesterol     Hypertension     Other ill-defined conditions     high cholesterol    Stroke [facility])        Past Surgical History:  Past Surgical History:   Procedure Laterality Date    CARDIAC SURG PROCEDURE UNLIST      stents x 3    HX GYN      hysterectomy    HX HEENT      tonsilectomy    HX ORTHOPAEDIC      back surgery, R knee replacement, neck surgery    HX ORTHOPAEDIC      L rotator cuff    HX OTHER SURGICAL      Veins stripped bilaterally       Family History:  Family History   Problem Relation Age of Onset    Cancer Mother        Social History:  Social History     Tobacco Use    Smoking status: Former Smoker    Smokeless tobacco: Never Used   Substance Use Topics    Alcohol use: No     Comment: occasional    Drug use: No       Allergies:   Allergies   Allergen Reactions    Levaquin In D5w [Levofloxacin In D5w] Hives and Anxiety    Gabapentin Other (comments)     Decreased motor function          Review of Systems   Constitutional: No  fever  Skin: No  rash  HEENT: No  nasal congestion  Resp: + cough  CV: No chest pain  GI: No vomiting  : No dysuria  MSK: No joint pain  Neuro: No numbness  Psych: No anxiety      Physical Exam     Patient Vitals for the past 12 hrs:   Temp Pulse Resp BP SpO2   08/09/21 2300 -- -- -- 137/68 96 %   08/09/21 2108 -- 81 -- (!) 141/80 --   08/09/21 2100 -- -- -- (!) 141/80 95 %   08/09/21 2045 -- -- -- (!) 149/80 96 %   08/09/21 2031 -- -- -- (!) 175/82 95 %   08/09/21 2015 -- -- -- (!) 153/83 96 %   08/09/21 2000 -- -- -- (!) 115/105 96 %   08/09/21 1945 -- -- -- (!) 167/95 97 %   08/09/21 1930 -- -- -- (!) 169/95 97 %   08/09/21 1915 -- -- -- (!) 158/85 97 %   08/09/21 1911 99.1 °F (37.3 °C) 89 18 (!) 161/115 97 %     General: alert, No acute distress  Eyes: EOMI, normal conjunctiva  ENT: moist mucous membranes. Neck: Active, full ROM of neck. Skin: No rashes. no jaundice              Lungs: Equal chest expansion. no respiratory distress. clear to auscultation bilaterally No accessory muscle usage  Heart: regular rate     2+ pitting edema bilaterally   2+ radial pulses and DPs bilaterally  Abd:  non distended soft, nontender. No rebound tenderness. No guarding  Back: Full ROM  MSK: Full, active ROM in all 4 extremities.    Neuro: Alert and oriented to Person, Place, Time, and Situation; normal speech;   Psych: Cooperative with exam; Appropriate mood and affect           Diagnostic Study Results     Labs -     Recent Results (from the past 12 hour(s))   EKG, 12 LEAD, INITIAL    Collection Time: 08/09/21  7:41 PM   Result Value Ref Range    Ventricular Rate 94 BPM    Atrial Rate 97 BPM    QRS Duration 80 ms    Q-T Interval 368 ms    QTC Calculation (Bezet) 460 ms    Calculated R Axis -15 degrees    Calculated T Axis 19 degrees    Diagnosis       Atrial fibrillation  Low voltage QRS  When compared with ECG of 16-MAY-2021 00:39,  Nonspecific T wave abnormality now evident in Inferior leads     CBC WITH AUTOMATED DIFF    Collection Time: 08/09/21  7:44 PM   Result Value Ref Range    WBC 11.3 (H) 3.6 - 11.0 K/uL    RBC 3.76 (L) 3.80 - 5.20 M/uL    HGB 11.5 11.5 - 16.0 g/dL    HCT 35.6 35.0 - 47.0 %    MCV 94.7 80.0 - 99.0 FL    MCH 30.6 26.0 - 34.0 PG    MCHC 32.3 30.0 - 36.5 g/dL    RDW 13.2 11.5 - 14.5 %    PLATELET 703 559 - 205 K/uL    MPV 10.1 8.9 - 12.9 FL    NRBC 0.0 0  WBC    ABSOLUTE NRBC 0.00 0.00 - 0.01 K/uL    NEUTROPHILS 68 32 - 75 %    LYMPHOCYTES 18 12 - 49 %    MONOCYTES 14 (H) 5 - 13 %    EOSINOPHILS 0 0 - 7 %    BASOPHILS 0 0 - 1 %    IMMATURE GRANULOCYTES 0 0.0 - 0.5 %    ABS. NEUTROPHILS 7.6 1.8 - 8.0 K/UL    ABS. LYMPHOCYTES 2.0 0.8 - 3.5 K/UL    ABS. MONOCYTES 1.6 (H) 0.0 - 1.0 K/UL    ABS. EOSINOPHILS 0.0 0.0 - 0.4 K/UL    ABS. BASOPHILS 0.1 0.0 - 0.1 K/UL    ABS. IMM. GRANS. 0.0 0.00 - 0.04 K/UL    DF AUTOMATED     METABOLIC PANEL, COMPREHENSIVE    Collection Time: 08/09/21  7:44 PM   Result Value Ref Range    Sodium 136 136 - 145 mmol/L    Potassium 4.0 3.5 - 5.1 mmol/L    Chloride 107 97 - 108 mmol/L    CO2 24 21 - 32 mmol/L    Anion gap 5 5 - 15 mmol/L    Glucose 129 (H) 65 - 100 mg/dL    BUN 16 6 - 20 MG/DL    Creatinine 0.99 0.55 - 1.02 MG/DL    BUN/Creatinine ratio 16 12 - 20      GFR est AA >60 >60 ml/min/1.73m2    GFR est non-AA 53 (L) >60 ml/min/1.73m2    Calcium 9.1 8.5 - 10.1 MG/DL    Bilirubin, total 0.7 0.2 - 1.0 MG/DL    ALT (SGPT) 16 12 - 78 U/L    AST (SGOT) 21 15 - 37 U/L    Alk.  phosphatase 83 45 - 117 U/L    Protein, total 6.9 6.4 - 8.2 g/dL    Albumin 2.4 (L) 3.5 - 5.0 g/dL    Globulin 4.5 (H) 2.0 - 4.0 g/dL    A-G Ratio 0.5 (L) 1.1 - 2.2     NT-PRO BNP    Collection Time: 08/09/21  7:44 PM   Result Value Ref Range    NT pro-BNP 7,583 (H) <450 PG/ML   TROPONIN I    Collection Time: 08/09/21  7:44 PM   Result Value Ref Range    Troponin-I, Qt. 0.07 (H) <0.05 ng/mL   URINALYSIS W/ REFLEX CULTURE    Collection Time: 08/09/21  7:44 PM    Specimen: Urine   Result Value Ref Range    Color YELLOW/STRAW      Appearance CLEAR CLEAR      Specific gravity 1.023 1.003 - 1.030      pH (UA) 6.0 5.0 - 8.0      Protein 30 (A) NEG mg/dL    Glucose Negative NEG mg/dL    Ketone Negative NEG mg/dL    Bilirubin Negative NEG      Blood Negative NEG      Urobilinogen 1.0 0.2 - 1.0 EU/dL    Nitrites Negative NEG      Leukocyte Esterase Negative NEG      WBC 0-4 0 - 4 /hpf    RBC 0-5 0 - 5 /hpf    Epithelial cells FEW FEW /lpf    Bacteria Negative NEG /hpf    UA:UC IF INDICATED CULTURE NOT INDICATED BY UA RESULT CNI      Hyaline cast 0-2 0 - 5 /lpf       Radiologic Studies -   XR CHEST PORT   Final Result      Possible trace left pleural effusion. Otherwise, no acute findings. Chronic   findings as above           CT Results  (Last 48 hours)      None          CXR Results  (Last 48 hours)                 08/09/21 1947  XR CHEST PORT Final result    Impression:      Possible trace left pleural effusion. Otherwise, no acute findings. Chronic   findings as above           Narrative:  EXAM:  XR CHEST PORT       INDICATION: Weakness       COMPARISON: Chest radiograph 5/15/2021       TECHNIQUE: Upright portable chest AP view       FINDINGS:        Cardiomediastinal silhouette stably mildly enlarged. Central pulmonary vascular   congestion. No focal consolidation. Subtle chronic subpleural scarring at the   peripheral lung bases, right more pronounced than left. Possible trace left   pleural effusion. No discernible pneumothorax. Left humeral head surgical anchor   screw. Medical Decision Making   I am the first provider for this patient. I reviewed the vital signs, available nursing notes, past medical history, past surgical history, family history and social history. Vital Signs-Reviewed the patient's vital signs.   Patient Vitals for the past 12 hrs:   Temp Pulse Resp BP SpO2   08/09/21 2300 -- -- -- 137/68 96 %   08/09/21 2108 -- 81 -- (!) 141/80 --   08/09/21 2100 -- -- -- (!) 141/80 95 %   08/09/21 2045 -- -- -- (!) 149/80 96 %   08/09/21 2031 -- -- -- (!) 175/82 95 %   08/09/21 2015 -- -- -- (!) 153/83 96 %   08/09/21 2000 -- -- -- (!) 115/105 96 %   08/09/21 1945 -- -- -- (!) 167/95 97 %   08/09/21 1930 -- -- -- (!) 169/95 97 %   08/09/21 1915 -- -- -- (!) 158/85 97 %   08/09/21 1911 99.1 °F (37.3 °C) 89 18 (!) 161/115 97 %       Provider Notes (Medical Decision Making):     Differential Diagnosis: UTI, electrolyte derangement, CHF, pneumonia,    Initial Plan: We will obtain basic laboratory studies, EKG, urinalysis sample, chest x-ray. The patient is in no acute distress, she is in no respiratory distress, normal oxygen saturation on room air, she certainly does have pitting edema in bilateral lower extremities. Will reassess after work-up. ED Course:   Initial assessment performed. The patients presenting problems have been discussed, and they are in agreement with the care plan formulated and outlined with them. I have encouraged them to ask questions as they arise throughout their visit. ED Course as of Aug 10 0240   Mon Aug 09, 2021   2033 On my interpretation of the patient's EKG atrial fibrillation at a rate of 94, QTc is 460, no ST elevation or depression.    [NW]   2223 On my interpretation of the patient's laboratory studies cardiac BNP is elevated, troponin is borderline, metabolic panel is largely unremarkable, urinalysis without infection    [NW]   Tue Aug 10, 2021   0004 Presenting today after a fall with generalized weakness, found to have an elevated cardiac BNP, borderline troponin without ischemic changes, she has not taken her Bumex, suspect is fluid overloaded and weak because of this. She is given Bumex, will admit for observation to be seen by case management. [NW]      ED Course User Index  [NW] Louis Saravia MD       I, Naa Sams MD, am the attending of record for this patient encounter. Disposition: Admitted    Admission Note:  Patient is being admitted to the hospital by Service: Hospitalist.  The results of their tests and reasons for their admission have been discussed with them and available family. They convey agreement and understanding for the need to be admitted and for their admission diagnosis. Diagnosis     Clinical Impression:   1.  Acute on chronic congestive heart failure, unspecified heart failure type ([address]    2. Weakness    3. Fall, initial encounter        Attestations:    Justen Nolan MD    Please note that this dictation was completed with [id number], the computer voice recognition software. Quite often unanticipated grammatical, syntax, homophones, and other interpretive errors are inadvertently transcribed by the computer software. Please disregard these errors. Please excuse any errors that have escaped final proofreading. Thank you.

## 2021-08-10 ENCOUNTER — APPOINTMENT (OUTPATIENT)
Dept: NON INVASIVE DIAGNOSTICS | Age: 86
DRG: 291 | End: 2021-08-10
Attending: INTERNAL MEDICINE
Payer: MEDICARE

## 2021-08-10 PROBLEM — I50.9 ACUTE EXACERBATION OF CHF (CONGESTIVE HEART FAILURE) (HCC): Status: ACTIVE | Noted: 2021-08-10

## 2021-08-10 LAB
APPEARANCE UR: CLEAR
ATRIAL RATE: 97 BPM
BACTERIA URNS QL MICRO: NEGATIVE /HPF
BILIRUB UR QL: NEGATIVE
BNP SERPL-MCNC: 8619 PG/ML
CALCULATED R AXIS, ECG10: -15 DEGREES
CALCULATED T AXIS, ECG11: 19 DEGREES
COLOR UR: NORMAL
DIAGNOSIS, 93000: NORMAL
ECHO AO ROOT DIAM: 2.97 CM
ECHO AV AREA PEAK VELOCITY: 2.51 CM2
ECHO AV AREA/BSA PEAK VELOCITY: 1.3 CM2/M2
ECHO AV CUSP MM: 1.43 CM
ECHO AV PEAK GRADIENT: 3.75 MMHG
ECHO AV PEAK VELOCITY: 96.84 CM/S
ECHO EST RA PRESSURE: 8 MMHG
ECHO LA AREA 4C: 24.19 CM2
ECHO LA MAJOR AXIS: 4.4 CM
ECHO LA MINOR AXIS: 2.24 CM
ECHO LA TO AORTIC ROOT RATIO: 1.59
ECHO LA TO AORTIC ROOT RATIO: 1.59
ECHO LA VOL 2C: 48.22 ML (ref 22–52)
ECHO LA VOL 4C: 70.28 ML (ref 22–52)
ECHO LA VOL BP: 66.35 ML (ref 22–52)
ECHO LA VOL/BSA BIPLANE: 33.85 ML/M2 (ref 16–28)
ECHO LA VOLUME INDEX A2C: 24.6 ML/M2 (ref 16–28)
ECHO LA VOLUME INDEX A4C: 35.86 ML/M2 (ref 16–28)
ECHO LV INTERNAL DIMENSION DIASTOLIC: 3.45 CM (ref 3.9–5.3)
ECHO LV INTERNAL DIMENSION SYSTOLIC: 2.47 CM
ECHO LV IVSD: 1.85 CM (ref 0.6–0.9)
ECHO LV IVSS: 2.07 CM
ECHO LV MASS 2D: 255.5 G (ref 67–162)
ECHO LV MASS INDEX 2D: 130.4 G/M2 (ref 43–95)
ECHO LV POSTERIOR WALL DIASTOLIC: 1.73 CM (ref 0.6–0.9)
ECHO LV POSTERIOR WALL SYSTOLIC: 2.01 CM
ECHO LVOT DIAM: 2.1 CM
ECHO LVOT PEAK GRADIENT: 1.98 MMHG
ECHO LVOT PEAK VELOCITY: 70.24 CM/S
ECHO MV AREA PHT: 4.98 CM2
ECHO MV E DECELERATION TIME (DT): 123.02 MS
ECHO MV E VELOCITY: 95.87 CM/S
ECHO MV PRESSURE HALF TIME (PHT): 44.19 MS
ECHO PV MAX VELOCITY: 67.47 CM/S
ECHO PV PEAK INSTANTANEOUS GRADIENT SYSTOLIC: 1.82 MMHG
ECHO RIGHT VENTRICULAR SYSTOLIC PRESSURE (RVSP): 25.69 MMHG
ECHO RV INTERNAL DIMENSION: 4 CM
ECHO TV REGURGITANT MAX VELOCITY: 210.31 CM/S
ECHO TV REGURGITANT MAX VELOCITY: 473.84 CM/S
ECHO TV REGURGITANT PEAK GRADIENT: 17.69 MMHG
EPITH CASTS URNS QL MICRO: NORMAL /LPF
GLUCOSE UR STRIP.AUTO-MCNC: NEGATIVE MG/DL
HGB UR QL STRIP: NEGATIVE
KETONES UR QL STRIP.AUTO: NEGATIVE MG/DL
LEUKOCYTE ESTERASE UR QL STRIP.AUTO: NEGATIVE
NITRITE UR QL STRIP.AUTO: NEGATIVE
PH UR STRIP: 7 [PH] (ref 5–8)
PROT UR STRIP-MCNC: NEGATIVE MG/DL
Q-T INTERVAL, ECG07: 368 MS
QRS DURATION, ECG06: 80 MS
QTC CALCULATION (BEZET), ECG08: 460 MS
RBC #/AREA URNS HPF: NORMAL /HPF (ref 0–5)
SP GR UR REFRACTOMETRY: 1.01 (ref 1–1.03)
TROPONIN I SERPL-MCNC: 0.05 NG/ML
TROPONIN I SERPL-MCNC: <0.05 NG/ML
UA: UC IF INDICATED,UAUC: NORMAL
UROBILINOGEN UR QL STRIP.AUTO: 1 EU/DL (ref 0.2–1)
VENTRICULAR RATE, ECG03: 94 BPM
WBC URNS QL MICRO: NORMAL /HPF (ref 0–4)

## 2021-08-10 PROCEDURE — 97165 OT EVAL LOW COMPLEX 30 MIN: CPT | Performed by: OCCUPATIONAL THERAPIST

## 2021-08-10 PROCEDURE — 65660000000 HC RM CCU STEPDOWN

## 2021-08-10 PROCEDURE — 97530 THERAPEUTIC ACTIVITIES: CPT

## 2021-08-10 PROCEDURE — 83880 ASSAY OF NATRIURETIC PEPTIDE: CPT

## 2021-08-10 PROCEDURE — 77030038269 HC DRN EXT URIN PURWCK BARD -A

## 2021-08-10 PROCEDURE — 2709999900 HC NON-CHARGEABLE SUPPLY

## 2021-08-10 PROCEDURE — 81001 URINALYSIS AUTO W/SCOPE: CPT

## 2021-08-10 PROCEDURE — 36415 COLL VENOUS BLD VENIPUNCTURE: CPT

## 2021-08-10 PROCEDURE — 97116 GAIT TRAINING THERAPY: CPT

## 2021-08-10 PROCEDURE — 97161 PT EVAL LOW COMPLEX 20 MIN: CPT

## 2021-08-10 PROCEDURE — 93306 TTE W/DOPPLER COMPLETE: CPT

## 2021-08-10 PROCEDURE — 74011250637 HC RX REV CODE- 250/637: Performed by: INTERNAL MEDICINE

## 2021-08-10 PROCEDURE — 84484 ASSAY OF TROPONIN QUANT: CPT

## 2021-08-10 PROCEDURE — 97535 SELF CARE MNGMENT TRAINING: CPT | Performed by: OCCUPATIONAL THERAPIST

## 2021-08-10 RX ORDER — SODIUM CHLORIDE 0.9 % (FLUSH) 0.9 %
5-40 SYRINGE (ML) INJECTION EVERY 8 HOURS
Status: DISCONTINUED | OUTPATIENT
Start: 2021-08-10 | End: 2021-08-13 | Stop reason: HOSPADM

## 2021-08-10 RX ORDER — SODIUM CHLORIDE 0.9 % (FLUSH) 0.9 %
5-40 SYRINGE (ML) INJECTION AS NEEDED
Status: DISCONTINUED | OUTPATIENT
Start: 2021-08-10 | End: 2021-08-13 | Stop reason: HOSPADM

## 2021-08-10 RX ORDER — BUMETANIDE 1 MG/1
1 TABLET ORAL DAILY
Status: DISCONTINUED | OUTPATIENT
Start: 2021-08-10 | End: 2021-08-10

## 2021-08-10 RX ORDER — ATORVASTATIN CALCIUM 40 MG/1
40 TABLET, FILM COATED ORAL
Status: DISCONTINUED | OUTPATIENT
Start: 2021-08-10 | End: 2021-08-13 | Stop reason: HOSPADM

## 2021-08-10 RX ORDER — ACETAMINOPHEN 650 MG/1
650 SUPPOSITORY RECTAL
Status: DISCONTINUED | OUTPATIENT
Start: 2021-08-10 | End: 2021-08-13 | Stop reason: HOSPADM

## 2021-08-10 RX ORDER — ONDANSETRON 2 MG/ML
4 INJECTION INTRAMUSCULAR; INTRAVENOUS
Status: DISCONTINUED | OUTPATIENT
Start: 2021-08-10 | End: 2021-08-13 | Stop reason: HOSPADM

## 2021-08-10 RX ORDER — ONDANSETRON 4 MG/1
4 TABLET, ORALLY DISINTEGRATING ORAL
Status: DISCONTINUED | OUTPATIENT
Start: 2021-08-10 | End: 2021-08-13 | Stop reason: HOSPADM

## 2021-08-10 RX ORDER — LOSARTAN POTASSIUM 100 MG/1
100 TABLET ORAL DAILY
Status: DISCONTINUED | OUTPATIENT
Start: 2021-08-10 | End: 2021-08-13 | Stop reason: HOSPADM

## 2021-08-10 RX ORDER — AMIODARONE HYDROCHLORIDE 200 MG/1
100 TABLET ORAL DAILY
Status: DISCONTINUED | OUTPATIENT
Start: 2021-08-10 | End: 2021-08-13 | Stop reason: HOSPADM

## 2021-08-10 RX ORDER — HYDRALAZINE HYDROCHLORIDE 50 MG/1
50 TABLET, FILM COATED ORAL 2 TIMES DAILY
Status: DISCONTINUED | OUTPATIENT
Start: 2021-08-10 | End: 2021-08-13 | Stop reason: HOSPADM

## 2021-08-10 RX ORDER — ACETAMINOPHEN 325 MG/1
650 TABLET ORAL
Status: DISCONTINUED | OUTPATIENT
Start: 2021-08-10 | End: 2021-08-13 | Stop reason: HOSPADM

## 2021-08-10 RX ORDER — BUMETANIDE 0.25 MG/ML
1 INJECTION INTRAMUSCULAR; INTRAVENOUS 2 TIMES DAILY
Status: DISCONTINUED | OUTPATIENT
Start: 2021-08-11 | End: 2021-08-12

## 2021-08-10 RX ORDER — POLYETHYLENE GLYCOL 3350 17 G/17G
17 POWDER, FOR SOLUTION ORAL DAILY PRN
Status: DISCONTINUED | OUTPATIENT
Start: 2021-08-10 | End: 2021-08-13 | Stop reason: HOSPADM

## 2021-08-10 RX ADMIN — AMIODARONE HYDROCHLORIDE 100 MG: 200 TABLET ORAL at 09:32

## 2021-08-10 RX ADMIN — Medication 10 ML: at 21:49

## 2021-08-10 RX ADMIN — RIVAROXABAN 20 MG: 20 TABLET, FILM COATED ORAL at 09:31

## 2021-08-10 RX ADMIN — LOSARTAN POTASSIUM 100 MG: 100 TABLET, FILM COATED ORAL at 09:32

## 2021-08-10 RX ADMIN — ATORVASTATIN CALCIUM 40 MG: 40 TABLET, FILM COATED ORAL at 21:49

## 2021-08-10 RX ADMIN — ATORVASTATIN CALCIUM 40 MG: 40 TABLET, FILM COATED ORAL at 02:30

## 2021-08-10 RX ADMIN — HYDRALAZINE HYDROCHLORIDE 50 MG: 50 TABLET, FILM COATED ORAL at 18:03

## 2021-08-10 RX ADMIN — HYDRALAZINE HYDROCHLORIDE 50 MG: 50 TABLET, FILM COATED ORAL at 09:31

## 2021-08-10 RX ADMIN — Medication 10 ML: at 06:43

## 2021-08-10 NOTE — PROGRESS NOTES
End of Shift Note    Bedside shift change report given to Erika Romano (oncoming nurse) by Dione Aldridge RN (offgoing nurse). Report included the following information SBAR, Kardex and ED Summary    Shift worked:  7a-7p     Shift summary and any significant changes:     Pt had a fine shift. Worked with PT/OT. UP to bedside commode for BM. Spent some time in chair, poor physical shap. Concerns for physician to address:  none     Zone phone for oncoming shift:   [phone]       Activity:  Activity Level: Up with Assistance  Number times ambulated in hallways past shift: 0  Number of times OOB to chair past shift: 1    Cardiac:   Cardiac Monitoring: Yes      Cardiac Rhythm: Atrial Fib    Access:   Current line(s): PIV     Genitourinary:   Urinary status: external catheter    Respiratory:   O2 Device: None (Room air)  Chronic home O2 use?: NO  Incentive spirometer at bedside: NO     GI:  Last Bowel Movement Date: 08/10/21  Current diet:  ADULT DIET Regular; Low Fat/Low Chol/High Fiber/FLAVIO  Passing flatus: YES  Tolerating current diet: YES       Pain Management:   Patient states pain is manageable on current regimen: YES    Skin:  Octavio Score: 18  Interventions: increase time out of bed    Patient Safety:  Fall Score:  Total Score: 6  Interventions: bed/chair alarm  High Fall Risk: Yes    Length of Stay:  Expected LOS: 4d 0h  Actual LOS: 0      Dione Aldridge RN

## 2021-08-10 NOTE — H&P
Hospitalist Admission Note    NAME: Vito Olguin   :  1929   MRN:  [id number]     Date/Time:  8/10/2021 6:06 AM    Patient PCP: Noni Flynn MD  ________________________________________________________________________    My assessment of this patient's clinical condition and my plan of care is as follows. Assessment / Plan:  Acute diastolic congestive heart failure exacerbation  -BNP is elevated at 7500. Chest x-ray shows central pulmonary vascular congestion last echo ejection fraction of 50 to 55%  -Start Bumex 1 mg IV twice daily. Continue home losartan. Not on beta-blocker at home  -Strict I's and O's, daily weights and low-salt diet    Atrial fibrillation  Dyslipidemia  Hypertension  Osteoarthritis  History of CVA  -Continue amiodarone, Xarelto  -Continue home Lipitor  -Continue hydralazine          Code Status: Full code  Surrogate Decision Maker: Daughter Oriana Glynn    DVT Prophylaxis: Xarelto  GI Prophylaxis: not indicated    Baseline: From home, independent of ADLs        Subjective:   CHIEF COMPLAINT: Swelling of the legs, generalized weakness    HISTORY OF PRESENT ILLNESS:     Cameron Pérez is a 80 y.o. female who presents with past medical history of congestive heart failure, atrial fibrillation is coming the hospital chief complaints of generalized weakness and also swelling of the lower legs. Patient reports is no reason short of health until about few days ago when she started not feeling well. She reports swelling of the legs along with pain in both the legs. Also reports some shortness of breath along with mild cough but no phlegm. No nausea or vomiting. Does not report any chest pain. On arrival to ED, blood pressure was elevated at 161/115. On labs WBC was 11.3. BMP was normal.  Troponin was 0.07.  proBNP was 7500. Chest x-ray showed central pulmonary vascular congestion. We were asked to admit for work up and evaluation of the above problems. Past Medical History:   Diagnosis Date    Arrhythmia     atrial fibrillation 2017    Arthritis     CAD (coronary artery disease)     MI    CKD (chronic kidney disease) stage 3    High cholesterol     Hypertension     Other ill-defined conditions     high cholesterol    Stroke [facility])         Past Surgical History:   Procedure Laterality Date    CARDIAC SURG PROCEDURE UNLIST      stents x 3    HX GYN      hysterectomy    HX HEENT      tonsilectomy    HX ORTHOPAEDIC      back surgery, R knee replacement, neck surgery    HX ORTHOPAEDIC      L rotator cuff    HX OTHER SURGICAL      Veins stripped bilaterally       Social History     Tobacco Use    Smoking status: Former Smoker    Smokeless tobacco: Never Used   Substance Use Topics    Alcohol use: No     Comment: occasional        Family History   Problem Relation Age of Onset    Cancer Mother      Allergies   Allergen Reactions    Levaquin In D5w [Levofloxacin In D5w] Hives and Anxiety    Gabapentin Other (comments)     Decreased motor function         Prior to Admission medications    Medication Sig Start Date End Date Taking? Authorizing Provider   rivaroxaban (XARELTO) 20 mg tab tablet Take 20 mg by mouth daily. Provider, Historical   Cholecalciferol, Vitamin D3, (VITAMIN D3) 2,000 unit cap capsule Take 2,000 Units by mouth daily. Provider, Historical   bumetanide (BUMEX) 1 mg tablet Take 1 Tab by mouth daily. 3/18/16   Nandini Campbell MD   amiodarone (PACERONE) 100 mg tablet Take 1 Tab by mouth daily. 3/16/16   Nandini Campbell MD   hydrALAZINE (APRESOLINE) 50 mg tablet Take 50 mg by mouth two (2) times a day. Provider, Historical   nitroglycerin (NITROSTAT) 0.4 mg SL tablet 0.4 mg by SubLINGual route every five (5) minutes as needed for Chest Pain. Provider, Historical   losartan (COZAAR) 100 mg tablet Take 100 mg by mouth daily. Provider, Historical   atorvastatin (LIPITOR) 20 mg tablet Take 40 mg by mouth nightly.     Provider, Historical       REVIEW OF SYSTEMS:     I am not able to complete the review of systems because: The patient is intubated and sedated    The patient has altered mental status due to his acute medical problems    The patient has baseline aphasia from prior stroke(s)    The patient has baseline dementia and is not reliable historian    The patient is in acute medical distress and unable to provide information           Total of 12 systems reviewed as follows:       POSITIVE= underlined text  Negative = text not underlined  General:  fever, chills, sweats, generalized weakness, weight loss/gain,      loss of appetite   Eyes:    blurred vision, eye pain, loss of vision, double vision  ENT:    rhinorrhea, pharyngitis   Respiratory:   cough, sputum production, SOB, MISTRY, wheezing, pleuritic pain   Cardiology:   chest pain, palpitations, orthopnea, PND, edema, syncope   Gastrointestinal:  abdominal pain , N/V, diarrhea, dysphagia, constipation, bleeding   Genitourinary:  frequency, urgency, dysuria, hematuria, incontinence   Muskuloskeletal :  arthralgia, myalgia, back pain  Hematology:  easy bruising, nose or gum bleeding, lymphadenopathy   Dermatological: rash, ulceration, pruritis, color change / jaundice  Endocrine:   hot flashes or polydipsia   Neurological:  headache, dizziness, confusion, focal weakness, paresthesia,     Speech difficulties, memory loss, gait difficulty  Psychological: Feelings of anxiety, depression, agitation    Objective:   VITALS:    Visit Vitals  BP (!) 150/100   Pulse 80   Temp 96.8 °F (36 °C)   Resp 23   Ht 5' 8\" (1.727 m)   Wt 82 kg (180 lb 12.4 oz)   SpO2 98%   BMI 27.49 kg/m²       PHYSICAL EXAM:    General:    Alert, cooperative, no distress, appears stated age.      HEENT: Atraumatic, anicteric sclerae, pink conjunctivae     No oral ulcers, mucosa moist, throat clear, dentition fair  Neck:  Supple, symmetrical,  thyroid: non tender  Lungs:   Bilateral air entry present, crackles present, no wheezing  Chest wall:  No tenderness  No Accessory muscle use. Heart:   Regular  rhythm,  No  murmur   No edema  Abdomen:   Soft, non-tender. Not distended. Bowel sounds normal  Extremities: No cyanosis. No clubbing,      Skin turgor normal, Capillary refill normal, Radial dial pulse 2+  Skin:     Not pale. Not Jaundiced  No rashes   Psych:  Not anxious or agitated. Neurologic: EOMs intact. No facial asymmetry. No aphasia or slurred speech. Symmetrical strength, Sensation grossly intact. Alert and oriented X 4.     _______________________________________________________________________  Care Plan discussed with:    Comments   Patient y    Family      RN y    Care Manager                    Consultant:      _______________________________________________________________________  Expected  Disposition:   Home with Family y   HH/PT/OT/RN    SNF/LTC    MOMO    ________________________________________________________________________  TOTAL TIME: 61  Minutes    Critical Care Provided     Minutes non procedure based      Comments    y Reviewed previous records   >50% of visit spent in counseling and coordination of care y Discussion with patient and/or family and questions answered       ________________________________________________________________________  Signed: Roberto Ricketts MD    Procedures: see electronic medical records for all procedures/Xrays and details which were not copied into this note but were reviewed prior to creation of Plan.     LAB DATA REVIEWED:    Recent Results (from the past 24 hour(s))   EKG, 12 LEAD, INITIAL    Collection Time: 08/09/21  7:41 PM   Result Value Ref Range    Ventricular Rate 94 BPM    Atrial Rate 97 BPM    QRS Duration 80 ms    Q-T Interval 368 ms    QTC Calculation (Bezet) 460 ms    Calculated R Axis -15 degrees    Calculated T Axis 19 degrees    Diagnosis       Atrial fibrillation  Low voltage QRS  When compared with ECG of 16-MAY-2021 00:39,  Nonspecific T wave abnormality now evident in Inferior leads     CBC WITH AUTOMATED DIFF    Collection Time: 08/09/21  7:44 PM   Result Value Ref Range    WBC 11.3 (H) 3.6 - 11.0 K/uL    RBC 3.76 (L) 3.80 - 5.20 M/uL    HGB 11.5 11.5 - 16.0 g/dL    HCT 35.6 35.0 - 47.0 %    MCV 94.7 80.0 - 99.0 FL    MCH 30.6 26.0 - 34.0 PG    MCHC 32.3 30.0 - 36.5 g/dL    RDW 13.2 11.5 - 14.5 %    PLATELET 329 402 - 821 K/uL    MPV 10.1 8.9 - 12.9 FL    NRBC 0.0 0  WBC    ABSOLUTE NRBC 0.00 0.00 - 0.01 K/uL    NEUTROPHILS 68 32 - 75 %    LYMPHOCYTES 18 12 - 49 %    MONOCYTES 14 (H) 5 - 13 %    EOSINOPHILS 0 0 - 7 %    BASOPHILS 0 0 - 1 %    IMMATURE GRANULOCYTES 0 0.0 - 0.5 %    ABS. NEUTROPHILS 7.6 1.8 - 8.0 K/UL    ABS. LYMPHOCYTES 2.0 0.8 - 3.5 K/UL    ABS. MONOCYTES 1.6 (H) 0.0 - 1.0 K/UL    ABS. EOSINOPHILS 0.0 0.0 - 0.4 K/UL    ABS. BASOPHILS 0.1 0.0 - 0.1 K/UL    ABS. IMM. GRANS. 0.0 0.00 - 0.04 K/UL    DF AUTOMATED     METABOLIC PANEL, COMPREHENSIVE    Collection Time: 08/09/21  7:44 PM   Result Value Ref Range    Sodium 136 136 - 145 mmol/L    Potassium 4.0 3.5 - 5.1 mmol/L    Chloride 107 97 - 108 mmol/L    CO2 24 21 - 32 mmol/L    Anion gap 5 5 - 15 mmol/L    Glucose 129 (H) 65 - 100 mg/dL    BUN 16 6 - 20 MG/DL    Creatinine 0.99 0.55 - 1.02 MG/DL    BUN/Creatinine ratio 16 12 - 20      GFR est AA >60 >60 ml/min/1.73m2    GFR est non-AA 53 (L) >60 ml/min/1.73m2    Calcium 9.1 8.5 - 10.1 MG/DL    Bilirubin, total 0.7 0.2 - 1.0 MG/DL    ALT (SGPT) 16 12 - 78 U/L    AST (SGOT) 21 15 - 37 U/L    Alk.  phosphatase 83 45 - 117 U/L    Protein, total 6.9 6.4 - 8.2 g/dL    Albumin 2.4 (L) 3.5 - 5.0 g/dL    Globulin 4.5 (H) 2.0 - 4.0 g/dL    A-G Ratio 0.5 (L) 1.1 - 2.2     NT-PRO BNP    Collection Time: 08/09/21  7:44 PM   Result Value Ref Range    NT pro-BNP 7,583 (H) <450 PG/ML   TROPONIN I    Collection Time: 08/09/21  7:44 PM   Result Value Ref Range    Troponin-I, Qt. 0.07 (H) <0.05 ng/mL   URINALYSIS W/ REFLEX CULTURE    Collection Time: 08/09/21  7:44 PM    Specimen: Urine   Result Value Ref Range    Color YELLOW/STRAW      Appearance CLEAR CLEAR      Specific gravity 1.023 1.003 - 1.030      pH (UA) 6.0 5.0 - 8.0      Protein 30 (A) NEG mg/dL    Glucose Negative NEG mg/dL    Ketone Negative NEG mg/dL    Bilirubin Negative NEG      Blood Negative NEG      Urobilinogen 1.0 0.2 - 1.0 EU/dL    Nitrites Negative NEG      Leukocyte Esterase Negative NEG      WBC 0-4 0 - 4 /hpf    RBC 0-5 0 - 5 /hpf    Epithelial cells FEW FEW /lpf    Bacteria Negative NEG /hpf    UA:UC IF INDICATED CULTURE NOT INDICATED BY UA RESULT CNI      Hyaline cast 0-2 0 - 5 /lpf   TROPONIN I    Collection Time: 08/10/21  2:36 AM   Result Value Ref Range    Troponin-I, Qt. 0.05 (H) <0.05 ng/mL

## 2021-08-10 NOTE — PROGRESS NOTES
Physical Therapy  Referral received, chart reviewed and attempted to see patient for PT evaluation. Patient currently HARSHA for testing. Will defer and continue to follow.    Meng Jesus, PT, DPT

## 2021-08-10 NOTE — WOUND CARE
Wound care nurse consult: consult placed by staff nurse for POA stage 2 to sacrum. Patient is a 81 y/o female admitted for acute CHF. Past Medical History:   Diagnosis Date    Arrhythmia     atrial fibrillation 2017    Arthritis     CAD (coronary artery disease)     MI    CKD (chronic kidney disease) stage 3    High cholesterol     Hypertension     Other ill-defined conditions     high cholesterol    Stroke [facility])      Patient A&O x3, up with assistance and able to turn on her side with no assistance. Patient states she fell and \"hit my backside\" before admitted and she is sore on her right side. Patient has a closed, epithelialized wound over coccyx that measures 0.2 x 0.2 x 0.1 cm. It is dry and flakey. Most likely caused by moisture. It does not resemble a pressure injury. Patient needs some protective cream to sacral coccyx area due to use of Purewick with diuretics.     Jimmy Haley RN, [employer]

## 2021-08-10 NOTE — PROGRESS NOTES
Problem: Pressure Injury - Risk of  Goal: *Prevention of pressure injury  Description: Document Octavio Scale and appropriate interventions in the flowsheet. Outcome: Progressing Towards Goal  Note: Pressure Injury Interventions:  Sensory Interventions: Assess changes in LOC    Moisture Interventions: Check for incontinence Q2 hours and as needed    Activity Interventions: Pressure redistribution bed/mattress(bed type)    Mobility Interventions: Pressure redistribution bed/mattress (bed type)    Nutrition Interventions: Offer support with meals,snacks and hydration                     Problem: Patient Education: Go to Patient Education Activity  Goal: Patient/Family Education  Outcome: Progressing Towards Goal     Problem: Falls - Risk of  Goal: *Absence of Falls  Description: Document Ksenia Fall Risk and appropriate interventions in the flowsheet.   Outcome: Progressing Towards Goal  Note: Fall Risk Interventions:  Mobility Interventions: Bed/chair exit alarm         Medication Interventions: Bed/chair exit alarm    Elimination Interventions: Bed/chair exit alarm    History of Falls Interventions: Bed/chair exit alarm

## 2021-08-10 NOTE — PROGRESS NOTES
Problem: Mobility Impaired (Adult and Pediatric)  Goal: *Acute Goals and Plan of Care (Insert Text)  Description: FUNCTIONAL STATUS PRIOR TO ADMISSION: Patient was modified independent using a rollator for functional mobility. Patient reports until a \"couple of weeks ago\" she was able to ambulate household distances. Since then and s/p fall, mobility has been progressively becoming more limited and difficult. HOME SUPPORT PRIOR TO ADMISSION: The patient lived with daughter who provided assistance as needed. Physical Therapy Goals  Initiated 8/10/2021  1. Patient will move from supine to sit and sit to supine , scoot up and down, and roll side to side in bed with modified independence within 7 day(s). 2.  Patient will transfer from bed to chair and chair to bed with minimal assistance/contact guard assist using the least restrictive device within 7 day(s). 3.  Patient will perform sit to stand with minimal assistance/contact guard assist within 7 day(s). 4.  Patient will ambulate with minimal assistance/contact guard assist for 25 feet with the least restrictive device within 7 day(s). Outcome: Not Met  PHYSICAL THERAPY EVALUATION  Patient: Sam Raza (89 y.o. female)  Date: 8/10/2021  Primary Diagnosis: Acute exacerbation of CHF (congestive heart failure) [facility] [I50.9]        Precautions: Fall    ASSESSMENT  Based on the objective data described below, the patient presents with generalized weakness, impaired balance, decreased activity tolerance/endurance, and increased anxiety/fear of falling all limiting patients functional mobility. Patient in bed on arrival and reports increased difficulty breathing. SpO2 stable on RA but breathing pattern improved with upright sitting position. Patient required increased time and assistance for all mobility. C/o dizziness up sitting but VS stable. 2 person assist for transfer to chair and patient with safety concerns due to impulsivity with transfer. Due to patients recent decline and current presentation of mobility, safety concerns with patient returning home with only daughters assistance. She would benefit from short rehab stay to improve safety and independence. Will continue to follow while admitted. Current Level of Function Impacting Discharge (mobility/balance): Min A bed mobility, Mod A x 2 with transfers     Functional Outcome Measure: The patient scored 25/100 on the Barthel Index outcome measure which is indicative of high dependency for functional mobility/ADL. Other factors to consider for discharge: high fall risk     Patient will benefit from skilled therapy intervention to address the above noted impairments. PLAN :  Recommendations and Planned Interventions: bed mobility training, transfer training, gait training, therapeutic exercises, patient and family training/education, and therapeutic activities      Frequency/Duration: Patient will be followed by physical therapy:  5 times a week to address goals. Recommendation for discharge: (in order for the patient to meet his/her long term goals)  Therapy up to 5 days/week in SNF setting    This discharge recommendation:  Has not yet been discussed the attending provider and/or case management    IF patient discharges home will need the following DME: patient owns DME required for discharge       SUBJECTIVE:   Patient stated I call the rescue squad when I can't get up.     OBJECTIVE DATA SUMMARY:   HISTORY:    Past Medical History:   Diagnosis Date    Arrhythmia     atrial fibrillation 2017    Arthritis     CAD (coronary artery disease)     MI    CKD (chronic kidney disease) stage 3    High cholesterol     Hypertension     Other ill-defined conditions     high cholesterol    Stroke [facility])      Past Surgical History:   Procedure Laterality Date    CARDIAC SURG PROCEDURE UNLIST      stents x 3    HX GYN      hysterectomy    HX HEENT      tonsilectomy    HX ORTHOPAEDIC      back surgery, R knee replacement, neck surgery    HX ORTHOPAEDIC      L rotator cuff    HX OTHER SURGICAL      Veins stripped bilaterally       Personal factors and/or comorbidities impacting plan of care:     Home Situation  Home Environment: Private residence  # Steps to Enter: 0  One/Two Story Residence: One story  Living Alone: No  Support Systems: Child(chelsy)  Patient Expects to be Discharged to[de-identified] [employer]  Current DME Used/Available at Home: Walker, rollator, Walker, rolling, Grab bars, Shower chair, [employer], straight, Commode, bedside  Tub or Shower Type: Shower    EXAMINATION/PRESENTATION/DECISION MAKING:   Critical Behavior:  Neurologic State: Alert  Orientation Level: Oriented X4  Cognition: Follows commands     Hearing: Auditory  Auditory Impairment: Hard of hearing, bilateral  Range Of Motion:  AROM: Generally decreased, functional                       Strength:    Strength: Generally decreased, functional                    Tone & Sensation:   Tone: Normal              Sensation: Intact               Coordination:  Coordination: Within functional limits  Functional Mobility:  Bed Mobility:  Rolling: Contact guard assistance; Additional time  Supine to Sit: Minimum assistance; Additional time     Scooting: Contact guard assistance; Additional time  Transfers:  Sit to Stand: Minimum assistance;Assist x2; Additional time  Stand to Sit: Moderate assistance; Additional time;Assist x2 (abrupt sitting, sit prior to reaching chair safely)        Bed to Chair: Minimum assistance; Moderate assistance;Assist x2 (progressed to mod A x 2 during transfer; use of RW)      Patient sitting in chair prior to safely reaching chair. Assist to prevent fall.          Balance:   Sitting: Impaired  Sitting - Static: Good (unsupported)  Sitting - Dynamic: Fair (occasional)  Standing: Impaired  Standing - Static: Fair;Poor (increased A with anxiety)  Standing - Dynamic : Poor  Ambulation/Gait Training:  Distance (ft): 3 Feet (ft)  Assistive Device: Walker, rolling;Gait belt  Ambulation - Level of Assistance: Minimal assistance; Moderate assistance;Assist x2; Additional time (Min A x 2 initially; progressed to mod A x 2)        Gait Abnormalities: Decreased step clearance        Base of Support: Widened     Speed/Faby: Pace decreased (<100 feet/min); Slow  Step Length: Right shortened;Left shortened                  Cues for increased knee extension and use of BUE on walker. Patient stating \"my knees are going to give out\" but no buckling noted. Patient limited to transferring to chair secondary to weakness and anxiety. Functional Measure:  Barthel Index:    Bathin  Bladder: 0  Bowels: 10  Groomin  Dressin  Feedin  Mobility: 0  Stairs: 0  Toilet Use: 0  Transfer (Bed to Chair and Back): 5  Total: 25/100     The Barthel ADL Index: Guidelines  1. The index should be used as a record of what a patient does, not as a record of what a patient could do. 2. The main aim is to establish degree of independence from any help, physical or verbal, however minor and for whatever reason. 3. The need for supervision renders the patient not independent. 4. A patient's performance should be established using the best available evidence. Asking the patient, friends/relatives and nurses are the usual sources, but direct observation and common sense are also important. However direct testing is not needed. 5. Usually the patient's performance over the preceding 24-48 hours is important, but occasionally longer periods will be relevant. 6. Middle categories imply that the patient supplies over 50 per cent of the effort. 7. Use of aids to be independent is allowed. Clarence Kruger., Barthel, D.W. ([id number]). Functional evaluation: the Barthel Index. [address] [facility] (14)2. PAM Melgar, Jerrod Thomas., Mariam Velez., Tomasz, [address] ().  Measuring the change indisability after inpatient rehabilitation; comparison of the responsiveness of the Barthel Index and Functional [place] Measure. Journal of Neurology, Neurosurgery, and Psychiatry, 66(4), 924-877. EUSEBIO Estes.A, MIRTA Giron, & Eva Tidwell M.A. (2004.) Assessment of post-stroke quality of life in cost-effectiveness studies: The usefulness of the Barthel Index and the EuroQoL-5D. Quality of Life Research, 15, 687-76         Physical Therapy Evaluation Charge Determination   History Examination Presentation Decision-Making   MEDIUM  Complexity : 1-2 comorbidities / personal factors will impact the outcome/ POC  MEDIUM Complexity : 3 Standardized tests and measures addressing body structure, function, activity limitation and / or participation in recreation  LOW Complexity : Stable, uncomplicated  Other outcome measures Barthel Index  MEDIUM      Based on the above components, the patient evaluation is determined to be of the following complexity level: LOW     Pain Rating:  No c/o pain     Activity Tolerance:   Fair and observed SOB with activity    After treatment patient left in no apparent distress:   Sitting in chair and Call bell within reach    COMMUNICATION/EDUCATION:   The patients plan of care was discussed with: Occupational therapist and Registered nurse. Fall prevention education was provided and the patient/caregiver indicated understanding., Patient/family have participated as able in goal setting and plan of care. , and Patient/family agree to work toward stated goals and plan of care.     Thank you for this referral.  Jeimy Lyons, PT, DPT   Time Calculation: 27 mins

## 2021-08-10 NOTE — ED NOTES
.Patient is being transferred to [facility], Room # 651 [id number]. Report given to [facility], [facility] for routine progression of care. Report consisted of the following information SBAR, Kardex, ED Summary, Intake/Output, MAR, Recent Results and Cardiac Rhythm A-FIB. Patient transferred to receiving unit by: Nursing Supervisor (RN or tech name). Outstanding consults needed: No     Next labs due: No     The following personal items will be sent with the patient during transfer to the floor:     All valuables:    Cardiac monitoring ordered: Yes    The following CURRENT information was reported to the receiving RN:    Code status: Full Code at time of transfer    Last set of vital signs:  Vital Signs  Level of Consciousness: Alert (0) (08/10/21 0200)  Temp: 96.8 °F (36 °C) (08/10/21 0100)  Temp Source: Oral (08/10/21 0100)  Pulse (Heart Rate): 80 (08/10/21 0600)  Heart Rate Source: Monitor (08/10/21 0200)  Cardiac Rhythm: Atrial Fib (08/09/21 2117)  Resp Rate: 23 (08/10/21 0600)  BP: (!) 150/100 (08/10/21 0600)  MAP (Monitor): 106 (08/10/21 0600)  MAP (Calculated): 117 (08/10/21 0600)  BP 1 Location: Left upper arm (08/09/21 1911)  BP 1 Method: Automatic (08/09/21 1911)  BP Patient Position: At rest (08/09/21 1911)  MEWS Score: 1 (08/09/21 1911)         Oxygen Therapy  O2 Sat (%): 98 % (08/10/21 0600)  Pulse via Oximetry: 81 beats per minute (08/10/21 0600)  O2 Device: None (Room air) (08/09/21 1911)      Last pain assessment:  Pain 1  Pain Scale 1: Numeric (0 - 10)  Pain Intensity 1: 10  Pain Location 1: Shoulder  Pain Orientation 1: Left, Right  Pain Description 1: Aching  Pain Intervention(s) 1: Therapeutic presence      Wounds: No     Urinary catheter: incontinent  Is there a dover order: No     LDAs:       Peripheral IV 08/09/21 Right Hand (Active)   Site Assessment Clean, dry, & intact 08/10/21 0500   Phlebitis Assessment 0 08/10/21 0500   Infiltration Assessment 0 08/10/21 0500   Dressing Status Clean, dry, & intact 08/10/21 0500   Hub Color/Line Status Pink 08/10/21 0500         Opportunity for questions and clarification was provided.     Angelica Andrew

## 2021-08-10 NOTE — PROGRESS NOTES
Patient seen and examined today, states that shortness of breath is very much the same as of last night. No chest pain, no other complaints. Plan: We will continue diuresing with Bumex 1 mg IV twice daily. Waiting on the echo for the more input. PT and OT consulted.

## 2021-08-10 NOTE — PROGRESS NOTES
Patient admitted from the ED. Report received from [facility]. Patient assessed and a wound was found on the sacrum. Patient placed on turn team, heels off loaded and elevated, and wound care consult placed. Report given to Rene Carbajal

## 2021-08-11 LAB
ANION GAP SERPL CALC-SCNC: 5 MMOL/L (ref 5–15)
BUN SERPL-MCNC: 17 MG/DL (ref 6–20)
BUN/CREAT SERPL: 22 (ref 12–20)
CALCIUM SERPL-MCNC: 9.5 MG/DL (ref 8.5–10.1)
CHLORIDE SERPL-SCNC: 106 MMOL/L (ref 97–108)
CO2 SERPL-SCNC: 26 MMOL/L (ref 21–32)
CREAT SERPL-MCNC: 0.79 MG/DL (ref 0.55–1.02)
ERYTHROCYTE [DISTWIDTH] IN BLOOD BY AUTOMATED COUNT: 13.1 % (ref 11.5–14.5)
GLUCOSE SERPL-MCNC: 87 MG/DL (ref 65–100)
HCT VFR BLD AUTO: 33.6 % (ref 35–47)
HGB BLD-MCNC: 10.6 G/DL (ref 11.5–16)
MCH RBC QN AUTO: 29.9 PG (ref 26–34)
MCHC RBC AUTO-ENTMCNC: 31.5 G/DL (ref 30–36.5)
MCV RBC AUTO: 94.9 FL (ref 80–99)
NRBC # BLD: 0 K/UL (ref 0–0.01)
NRBC BLD-RTO: 0 PER 100 WBC
PLATELET # BLD AUTO: 183 K/UL (ref 150–400)
PMV BLD AUTO: 11 FL (ref 8.9–12.9)
POTASSIUM SERPL-SCNC: 4.1 MMOL/L (ref 3.5–5.1)
RBC # BLD AUTO: 3.54 M/UL (ref 3.8–5.2)
SODIUM SERPL-SCNC: 137 MMOL/L (ref 136–145)
WBC # BLD AUTO: 9.1 K/UL (ref 3.6–11)

## 2021-08-11 PROCEDURE — 74011000250 HC RX REV CODE- 250: Performed by: INTERNAL MEDICINE

## 2021-08-11 PROCEDURE — 97530 THERAPEUTIC ACTIVITIES: CPT

## 2021-08-11 PROCEDURE — 36415 COLL VENOUS BLD VENIPUNCTURE: CPT

## 2021-08-11 PROCEDURE — 74011250637 HC RX REV CODE- 250/637: Performed by: INTERNAL MEDICINE

## 2021-08-11 PROCEDURE — 97535 SELF CARE MNGMENT TRAINING: CPT

## 2021-08-11 PROCEDURE — 80048 BASIC METABOLIC PNL TOTAL CA: CPT

## 2021-08-11 PROCEDURE — 65660000000 HC RM CCU STEPDOWN

## 2021-08-11 PROCEDURE — 85027 COMPLETE CBC AUTOMATED: CPT

## 2021-08-11 RX ADMIN — BUMETANIDE 1 MG: 0.25 INJECTION INTRAMUSCULAR; INTRAVENOUS at 08:47

## 2021-08-11 RX ADMIN — HYDRALAZINE HYDROCHLORIDE 50 MG: 50 TABLET, FILM COATED ORAL at 08:47

## 2021-08-11 RX ADMIN — RIVAROXABAN 20 MG: 20 TABLET, FILM COATED ORAL at 08:47

## 2021-08-11 RX ADMIN — AMIODARONE HYDROCHLORIDE 100 MG: 200 TABLET ORAL at 08:47

## 2021-08-11 RX ADMIN — Medication 10 ML: at 06:00

## 2021-08-11 RX ADMIN — ATORVASTATIN CALCIUM 40 MG: 40 TABLET, FILM COATED ORAL at 22:31

## 2021-08-11 RX ADMIN — Medication 10 ML: at 17:06

## 2021-08-11 RX ADMIN — LOSARTAN POTASSIUM 100 MG: 100 TABLET, FILM COATED ORAL at 08:46

## 2021-08-11 NOTE — ROUTINE PROCESS
Bedside and Verbal shift change report given to Joanne Peters RN (oncoming nurse) by Angel Avalos RN (offgoing nurse). Report included the following information SBAR, Kardex and Quality Measures.

## 2021-08-11 NOTE — PROGRESS NOTES
Problem: Mobility Impaired (Adult and Pediatric)  Goal: *Acute Goals and Plan of Care (Insert Text)  Description: FUNCTIONAL STATUS PRIOR TO ADMISSION: Patient was modified independent using a rollator for functional mobility. Patient reports until a \"couple of weeks ago\" she was able to ambulate household distances. Since then and s/p fall, mobility has been progressively becoming more limited and difficult. HOME SUPPORT PRIOR TO ADMISSION: The patient lived with daughter who provided assistance as needed. Physical Therapy Goals  Initiated 8/10/2021  1. Patient will move from supine to sit and sit to supine , scoot up and down, and roll side to side in bed with modified independence within 7 day(s). 2.  Patient will transfer from bed to chair and chair to bed with minimal assistance/contact guard assist using the least restrictive device within 7 day(s). 3.  Patient will perform sit to stand with minimal assistance/contact guard assist within 7 day(s). 4.  Patient will ambulate with minimal assistance/contact guard assist for 25 feet with the least restrictive device within 7 day(s). Outcome: Not Met     PHYSICAL THERAPY TREATMENT  Patient: Arabella Orozco (61 y.o. female)  Date: 8/11/2021  Diagnosis: Acute exacerbation of CHF (congestive heart failure) [facility] [I50.9] <principal problem not specified>       Precautions: Fall  Chart, physical therapy assessment, plan of care and goals were reviewed. ASSESSMENT  Patient continues with skilled PT services and is not progressing towards goals. Pt is with c/o dizziness and low BP today supine and with edge of bed mobility. She is diaphoretic and overall lethargic. Transfers and bed to chair deferred at this time due to above. Continue to follow for progression toward goals as pt able to safely participate with mobility efforts.     Patient Vitals for the past 4 hrs:   Temp Pulse Resp BP SpO2   08/11/21 1119 97.2 °F (36.2 °C) 88 16 (!) 99/57 96 %           Current Level of Function Impacting Discharge (mobility/balance): min. Assist for bed mob./scooting efforts    Other factors to consider for discharge: pt will require additional assist upon discharge for safety          PLAN :  Patient continues to benefit from skilled intervention to address the above impairments. Continue treatment per established plan of care. to address goals. Recommendation for discharge: (in order for the patient to meet his/her long term goals)  Therapy up to 5 days/week in SNF setting    This discharge recommendation:  Has been made in collaboration with the attending provider and/or case management    IF patient discharges home will need the following DME: to be determined (TBD)       SUBJECTIVE:   Patient stated I am so tired.     OBJECTIVE DATA SUMMARY:   Critical Behavior:  Neurologic State: Alert  Orientation Level: Oriented X4  Cognition: Follows commands, Appropriate for age attention/concentration  Safety/Judgement: Decreased awareness of need for safety  Functional Mobility Training:  Bed Mobility:     Supine to Sit: Minimum assistance; Additional time;Bed Modified  Sit to Supine: Minimum assistance  Scooting: Minimum assistance     Interventions: Tactile cues; Verbal cues; Weight shifting training/Pressure relief    Balance:  Sitting: Intact; Without support  Sitting - Static: Good (unsupported)    Pain Rating:  C/o buttock pain \"It's raw\" with edge of bed     Activity Tolerance:   Fair and signs and symptoms of orthostatic hypotension    After treatment patient left in no apparent distress:   Supine in bed, Call bell within reach, Bed / chair alarm activated, Side rails x 3, and nurse notified     COMMUNICATION/COLLABORATION:   The patients plan of care was discussed with: Registered nurse and Certified nursing assistant/patient care technician.      Cody Sawyer, PT, DPT   Time Calculation: 19 mins

## 2021-08-11 NOTE — PROGRESS NOTES
Problem: Self Care Deficits Care Plan (Adult)  Goal: *Acute Goals and Plan of Care (Insert Text)  Description:     FUNCTIONAL STATUS PRIOR TO ADMISSION: Prior to 2 weeks ago she was ambulating to the bathroom with her rollator and performing ADLs at an independent to mod I level. She has had several falls this year, 8 per nursing. HOME SUPPORT: The patient lived with her daughter. Occupational Therapy Goals  Initiated 8/10/2021  1. Patient will perform grooming standing with minimal assistance/contact guard assist within 7 day(s). 2.  Patient will perform upper body dressing with supervision/set-up within 7 day(s). 3.  Patient will perform lower body dressing with moderate assistance  within 7 day(s). 4.  Patient will perform toilet/BSC transfers with minimal assistance/contact guard assist within 7 day(s). 5.  Patient will perform all aspects of toileting with moderate assistance  within 7 day(s). 6.  Patient will perform sponge bathing with minimal assistance/contact guard assist within 7 day(s). Outcome: Progressing Towards Goal   OCCUPATIONAL THERAPY TREATMENT  Patient: Valerio Ying (78 y.o. female)  Date: 8/11/2021  Diagnosis: Acute exacerbation of CHF (congestive heart failure) ([facility] [I50.9] <principal problem not specified>       Precautions: Fall  Chart, occupational therapy assessment, plan of care, and goals were reviewed. ASSESSMENT  Patient continues with skilled OT services and is progressing towards goals. Tolerated session well, VSS throughout. Completed transfer to EOB with Min A with goal of sitting in bedside chair, however patient reports feeling too much generalized pain and requested to return to EOB. Returned to EOB, set up lunch tray to maximize independent self feeding including opening containers and drinks. Patient continues to be limited by impaired endurance and strength. Patient left with all needs in reach and in NAD.  Will continue to benefit from OT services and progress as able. Current Level of Function Impacting Discharge (ADLs): up to Mod A ADL    Other factors to consider for discharge: below baseline         PLAN :  Patient continues to benefit from skilled intervention to address the above impairments. Continue treatment per established plan of care to address goals. Recommend with staff: OOB 3x daily for meals, functional mobility to bathroom    Recommend next OT session: OOB ADL    Recommendation for discharge: (in order for the patient to meet his/her long term goals)  Therapy up to 5 days/week in SNF setting    This discharge recommendation:  A follow-up discussion with the attending provider and/or case management is planned    IF patient discharges home will need the following DME: TBD pending progress       SUBJECTIVE:   Patient stated I am sore all over.     OBJECTIVE DATA SUMMARY:   Cognitive/Behavioral Status:  Neurologic State: Alert  Orientation Level: Oriented X4  Cognition: Follows commands  Perception: Appears intact  Perseveration: No perseveration noted       Functional Mobility and Transfers for ADLs:  Bed Mobility:  Supine to Sit: Minimum assistance  Sit to Supine: Minimum assistance  Scooting: Minimum assistance    Transfers:             Balance:  Sitting: Intact  Sitting - Static: Good (unsupported)  Sitting - Dynamic: Fair (occasional)    ADL Intervention:  Feeding  Container Management: Total assistance (dependent)  Food to Mouth: Set-up    Grooming  Grooming Assistance: Contact guard assistance  Position Performed: Seated edge of bed  Washing Face: Supervision                                      Pain:  None reported    Activity Tolerance:   Fair and requires rest breaks    After treatment patient left in no apparent distress:   Supine in bed and Call bell within reach    COMMUNICATION/COLLABORATION:   The patients plan of care was discussed with: Case management.      Rehana Davis OT  Time Calculation: 15 mins

## 2021-08-11 NOTE — PROGRESS NOTES
Hospitalist Progress Note    NAME: Janina Burton   :  1929   MRN:  [id number]     I reviewed pertinent labs and imaging, and discussed /agreed on the plan of care with Dr. Pooja Silvestre. Assessment / Plan:  Acute diastolic congestive heart failure exacerbation pBNP 7583  Atrial fibrillation  Dyslipidemia  Hypertension  -CXR shows central pulmonary vascular congestion  -ECHO - EF 55-60%. Age appropriate LV diastolic function. Mild MVR. -Continue IV Bumex BID   -Tolerating RA but conversationally SOB   -Continue amiodarone, atorvastatin, hydralazine, losartan and xarelto   -Strict I&Os and Daily Weights   -Likely will be ready for discharge in next 24-48 hours once diuresis complete     Debility   Recurrent Falls at [address] [facility] with daughter  -Appreciate PT/OT evaluations - recommend SNF  -Ask CM to set up SNF at discharge     Osteoarthritis  History of CVA Continue Xarelto     25.0 - 29.9 Overweight / Body mass index is 27.49 kg/m². Estimated discharge date:   Barriers: diuresis     Code status: Full  Prophylaxis: Xarelto   Recommended Disposition: SNF/LTC     Subjective:     Chief Complaint / Reason for Physician Visit  Patient seen at bedside. Stated she has fallen several times over the last few days. Reports weakness, denies any other complaints. Discussed plan of care, patient verbalized understanding and agreement. Discussed with RN events overnight. Left VM for daughter to update on plan of care. Review of Systems:  Symptom Y/N Comments  Symptom Y/N Comments   Fever/Chills n   Chest Pain n    Poor Appetite n   Edema n    Cough n   Abdominal Pain n    Sputum n   Joint Pain n    SOB/MISTRY y   Pruritis/Rash n    Nausea/vomit n   Tolerating PT/OT y    Diarrhea n   Tolerating Diet y    Constipation n   Other       Could NOT obtain due to:      Objective:     VITALS:   Last 24hrs VS reviewed since prior progress note.  Most recent are:  Patient Vitals for the past 24 hrs:   Temp Pulse Resp BP SpO2   08/11/21 0726 98.6 °F (37 °C) 84 18 (!) 159/87 98 %   08/11/21 0400  73      08/11/21 0357 98.7 °F (37.1 °C) 79 17 (!) 143/63 97 %   08/11/21 0000  88      08/10/21 2334 98.4 °F (36.9 °C) 79 18 (!) 151/64 99 %   08/10/21 2000  77      08/10/21 1959 98.3 °F (36.8 °C) 100 17 134/73 99 %   08/10/21 1518 98.4 °F (36.9 °C) 100  (!) 152/78 98 %   08/10/21 1456 98.4 °F (36.9 °C) 85 22 139/63 99 %   08/10/21 1059 98 °F (36.7 °C) 91 22 127/69 97 %     No intake or output data in the 24 hours ending 08/11/21 0916     I had a face to face encounter and independently examined this patient on 8/11/2021, as outlined below:  PHYSICAL EXAM:  General: WD, WN. Alert, cooperative, conversationally SOB in no acute distress    EENT:  EOMI. Anicteric sclerae. MMM  Resp:  LS diminished, no wheezing or rales. No accessory muscle use  CV:  Regular  rhythm,  No edema  GI:  Soft, obese, Non tender. +Bowel sounds  Neurologic:  Alert and oriented X 3, normal speech,   Psych:   Good insight. Not anxious nor agitated  Skin:  No rashes. No jaundice, scattered ecchymosis     Reviewed most current lab test results and cultures  YES  Reviewed most current radiology test results   YES  Review and summation of old records today    NO  Reviewed patient's current orders and MAR    YES  PMH/SH reviewed - no change compared to H&P  ________________________________________________________________________  Care Plan discussed with:    Comments   Patient x    Family  x Left VM with daughter   RN x    Care Manager x    Consultant                        Multidiciplinary team rounds were held today with , nursing, pharmacist and clinical coordinator. Patient's plan of care was discussed; medications were reviewed and discharge planning was addressed.      ________________________________________________________________________  Total NON critical care TIME:  25   Minutes    Total CRITICAL CARE TIME Spent: Minutes non procedure based      Comments   >50% of visit spent in counseling and coordination of care x    ________________________________________________________________________  Mikki Pascual NP     Procedures: see electronic medical records for all procedures/Xrays and details which were not copied into this note but were reviewed prior to creation of Plan. LABS:  I reviewed today's most current labs and imaging studies.   Pertinent labs include:  Recent Labs     08/11/21  0639 08/09/21  1944   WBC 9.1 11.3*   HGB 10.6* 11.5   HCT 33.6* 35.6    232     Recent Labs     08/11/21  0639 08/09/21  1944    136   K 4.1 4.0    107   CO2 26 24   GLU 87 129*   BUN 17 16   CREA 0.79 0.99   CA 9.5 9.1   ALB  --  2.4*   TBILI  --  0.7   ALT  --  16       Signed: Mikki Pascual NP

## 2021-08-11 NOTE — PROGRESS NOTES
End of Shift Note    Bedside shift change report given to [facility] (oncoming nurse) by Law Thompson (offgoing nurse). Report included the following information SBAR, Kardex, ED Summary, Intake/Output, MAR, Accordion and Recent Results    Shift worked:  night     Shift summary and any significant changes:    Pt stable throughout shift. Labs drawn this a.m. Concerns for physician to address:    Zone phone for oncoming shift:       Activity:  Activity Level: Up with Assistance  Number times ambulated in hallways past shift: 0  Number of times OOB to chair past shift: 0    Cardiac:   Cardiac Monitoring: Yes      Cardiac Rhythm: Atrial Fib    Access:   Current line(s): PIV     Genitourinary:   Urinary status: voiding and external catheter    Respiratory:   O2 Device: None (Room air)  Chronic home O2 use?: NO  Incentive spirometer at bedside: NO     GI:  Last Bowel Movement Date: 08/10/21  Current diet:  ADULT DIET Regular; Low Fat/Low Chol/High Fiber/FLAVIO  Passing flatus: YES  Tolerating current diet: YES       Pain Management:   Patient states pain is manageable on current regimen: YES    Skin:  Octavio Score: 19  Interventions: turn team, float heels, increase time out of bed, PT/OT consult and internal/external urinary devices    Patient Safety:  Fall Score:  Total Score: 6  Interventions: bed/chair alarm, assistive device (walker, cane, etc), gripper socks, pt to call before getting OOB and stay with me (per policy)  High Fall Risk: Yes    Length of Stay:  Expected LOS: 4d 0h  Actual LOS: [address]

## 2021-08-11 NOTE — PROGRESS NOTES
Problem: Falls - Risk of  Goal: *Absence of Falls  Description: Document Narendra Sites Fall Risk and appropriate interventions in the flowsheet.   Outcome: Progressing Towards Goal  Note: Fall Risk Interventions:  Mobility Interventions: Bed/chair exit alarm, Communicate number of staff needed for ambulation/transfer, Patient to call before getting OOB         Medication Interventions: Bed/chair exit alarm, Patient to call before getting OOB, Teach patient to arise slowly    Elimination Interventions: Bed/chair exit alarm, Call light in reach    History of Falls Interventions: Bed/chair exit alarm

## 2021-08-12 ENCOUNTER — APPOINTMENT (OUTPATIENT)
Dept: CT IMAGING | Age: 86
DRG: 291 | End: 2021-08-12
Attending: NURSE PRACTITIONER
Payer: MEDICARE

## 2021-08-12 LAB
ANION GAP SERPL CALC-SCNC: 6 MMOL/L (ref 5–15)
BUN SERPL-MCNC: 25 MG/DL (ref 6–20)
BUN/CREAT SERPL: 25 (ref 12–20)
CALCIUM SERPL-MCNC: 9 MG/DL (ref 8.5–10.1)
CHLORIDE SERPL-SCNC: 105 MMOL/L (ref 97–108)
CO2 SERPL-SCNC: 25 MMOL/L (ref 21–32)
COVID-19 RAPID TEST, COVR: NOT DETECTED
CREAT SERPL-MCNC: 1.01 MG/DL (ref 0.55–1.02)
D DIMER PPP FEU-MCNC: 1.85 MG/L FEU (ref 0–0.65)
GLUCOSE SERPL-MCNC: 100 MG/DL (ref 65–100)
POTASSIUM SERPL-SCNC: 4.1 MMOL/L (ref 3.5–5.1)
SODIUM SERPL-SCNC: 136 MMOL/L (ref 136–145)
SOURCE, COVRS: NORMAL
TROPONIN I SERPL-MCNC: <0.05 NG/ML

## 2021-08-12 PROCEDURE — 74011250637 HC RX REV CODE- 250/637: Performed by: NURSE PRACTITIONER

## 2021-08-12 PROCEDURE — 74011250636 HC RX REV CODE- 250/636: Performed by: NURSE PRACTITIONER

## 2021-08-12 PROCEDURE — 74011000636 HC RX REV CODE- 636: Performed by: STUDENT IN AN ORGANIZED HEALTH CARE EDUCATION/TRAINING PROGRAM

## 2021-08-12 PROCEDURE — P9047 ALBUMIN (HUMAN), 25%, 50ML: HCPCS | Performed by: NURSE PRACTITIONER

## 2021-08-12 PROCEDURE — 87635 SARS-COV-2 COVID-19 AMP PRB: CPT

## 2021-08-12 PROCEDURE — 93005 ELECTROCARDIOGRAM TRACING: CPT

## 2021-08-12 PROCEDURE — 65660000000 HC RM CCU STEPDOWN

## 2021-08-12 PROCEDURE — 74011250637 HC RX REV CODE- 250/637: Performed by: INTERNAL MEDICINE

## 2021-08-12 PROCEDURE — 74011000250 HC RX REV CODE- 250: Performed by: INTERNAL MEDICINE

## 2021-08-12 PROCEDURE — 85379 FIBRIN DEGRADATION QUANT: CPT

## 2021-08-12 PROCEDURE — 36415 COLL VENOUS BLD VENIPUNCTURE: CPT

## 2021-08-12 PROCEDURE — 71275 CT ANGIOGRAPHY CHEST: CPT

## 2021-08-12 PROCEDURE — 84484 ASSAY OF TROPONIN QUANT: CPT

## 2021-08-12 PROCEDURE — 80048 BASIC METABOLIC PNL TOTAL CA: CPT

## 2021-08-12 RX ORDER — ALBUMIN HUMAN 250 G/1000ML
25 SOLUTION INTRAVENOUS ONCE
Status: COMPLETED | OUTPATIENT
Start: 2021-08-12 | End: 2021-08-12

## 2021-08-12 RX ADMIN — HYDRALAZINE HYDROCHLORIDE 50 MG: 50 TABLET, FILM COATED ORAL at 08:14

## 2021-08-12 RX ADMIN — AMIODARONE HYDROCHLORIDE 100 MG: 200 TABLET ORAL at 08:15

## 2021-08-12 RX ADMIN — ACETAMINOPHEN 650 MG: 325 TABLET ORAL at 12:28

## 2021-08-12 RX ADMIN — IOPAMIDOL 100 ML: 755 INJECTION, SOLUTION INTRAVENOUS at 12:00

## 2021-08-12 RX ADMIN — ALBUMIN (HUMAN) 25 G: 0.25 INJECTION, SOLUTION INTRAVENOUS at 16:29

## 2021-08-12 RX ADMIN — BUMETANIDE 1 MG: 0.25 INJECTION INTRAMUSCULAR; INTRAVENOUS at 08:15

## 2021-08-12 RX ADMIN — LOSARTAN POTASSIUM 100 MG: 100 TABLET, FILM COATED ORAL at 08:15

## 2021-08-12 RX ADMIN — Medication 10 ML: at 22:37

## 2021-08-12 RX ADMIN — ATORVASTATIN CALCIUM 40 MG: 40 TABLET, FILM COATED ORAL at 22:37

## 2021-08-12 RX ADMIN — RIVAROXABAN 20 MG: 20 TABLET, FILM COATED ORAL at 16:29

## 2021-08-12 NOTE — PROGRESS NOTES
Hospitalist Progress Note    NAME: Arabella Orozco   :  1929   MRN:  [id number]     I reviewed pertinent labs and imaging, and discussed /agreed on the plan of care with Dr. Carmen Aranda. Assessment / Plan:  Acute diastolic congestive heart failure exacerbation pBNP 7583  Atrial fibrillation  Dyslipidemia  Hypertension  -CXR shows central pulmonary vascular congestion  -ECHO - EF 55-60%. Age appropriate LV diastolic function. Mild MVR. -CTA Chest - Cardiomegaly with CAD. Small bilateral pleural effusions with mild bibasilar atelectasis. -Stop IV bumex with SBP 80s  -Give dose of albumin   -Tolerating RA but conversationally SOB   -Continue amiodarone, atorvastatin, hydralazine, losartan and xarelto   -Strict I&Os and Daily Weights - No I&Os recorded   -Discharge in AM to [address] of  today   -Troponin negative   -EKG with no changes   -CTA negative no PE     Debility   Recurrent Falls at Home  -Lives with daughter  -Appreciate PT/OT evaluations - recommend SNF  -Appreciate CM - set up transfer to SNF      Osteoarthritis  History of CVA Continue Xarelto     25.0 - 29.9 Overweight / Body mass index is 27.49 kg/m². Estimated discharge date:   Barriers: diuresis     Code status: DNR - patient stated that she would like to be DNR, signed DDNR at that time   Prophylaxis: Xarelto   Recommended Disposition: SNF/LTC     Subjective:     Chief Complaint / Reason for Physician Visit  Patient seen at bedside. Reports CP with inhalation today. Discussed plan of care, patient verbalized understanding and agreement. Discussed with RN events overnight.      Review of Systems:  Symptom Y/N Comments  Symptom Y/N Comments   Fever/Chills n   Chest Pain n    Poor Appetite n   Edema n    Cough n   Abdominal Pain n    Sputum n   Joint Pain n    SOB/MISTRY y   Pruritis/Rash n    Nausea/vomit n   Tolerating PT/OT y    Diarrhea n   Tolerating Diet y    Constipation n   Other       Could NOT obtain due to:      Objective:     VITALS:   Last 24hrs VS reviewed since prior progress note. Most recent are:  Patient Vitals for the past 24 hrs:   Temp Pulse Resp BP SpO2   08/12/21 1118 97.8 °F (36.6 °C) 86 18 (!) 117/54 97 %   08/12/21 0818 98 °F (36.7 °C) 84 18 (!) 144/73 96 %   08/11/21 2329 98.7 °F (37.1 °C) 89 18 131/67 99 %   08/11/21 1930 98.6 °F (37 °C) 87 18 129/62 97 %       Intake/Output Summary (Last 24 hours) at 8/12/2021 1531  Last data filed at 8/12/2021 1425  Gross per 24 hour   Intake    Output 1600 ml   Net -1600 ml        I had a face to face encounter and independently examined this patient on 8/12/2021, as outlined below:  PHYSICAL EXAM:  General: WD, WN. Alert, cooperative, conversationally SOB in no acute distress    EENT:  EOMI. Anicteric sclerae. MMM  Resp:  LS diminished, no wheezing or rales. No accessory muscle use  CV:  Regular  rhythm,  No edema  GI:  Soft, obese, Non tender. +Bowel sounds  Neurologic:  Alert and oriented X 3, normal speech,   Psych:   Good insight. Not anxious nor agitated  Skin:  No rashes. No jaundice, scattered ecchymosis     Reviewed most current lab test results and cultures  YES  Reviewed most current radiology test results   YES  Review and summation of old records today    NO  Reviewed patient's current orders and MAR    YES  PMH/ reviewed - no change compared to H&P  ________________________________________________________________________  Care Plan discussed with:    Comments   Patient x    Family      RN x    Care Manager x    Consultant                        Multidiciplinary team rounds were held today with , nursing, pharmacist and clinical coordinator. Patient's plan of care was discussed; medications were reviewed and discharge planning was addressed.      ________________________________________________________________________  Total NON critical care TIME:  25   Minutes    Total CRITICAL CARE TIME Spent:   Minutes non procedure based Comments   >50% of visit spent in counseling and coordination of care x    ________________________________________________________________________  Filipe Ellis NP     Procedures: see electronic medical records for all procedures/Xrays and details which were not copied into this note but were reviewed prior to creation of Plan. LABS:  I reviewed today's most current labs and imaging studies.   Pertinent labs include:  Recent Labs     08/11/21  0639 08/09/21  1944   WBC 9.1 11.3*   HGB 10.6* 11.5   HCT 33.6* 35.6    232     Recent Labs     08/12/21 0459 08/11/21  0639 08/09/21  1944    137 136   K 4.1 4.1 4.0    106 107   CO2 25 26 24    87 129*   BUN 25* 17 16   CREA 1.01 0.79 0.99   CA 9.0 9.5 9.1   ALB  --   --  2.4*   TBILI  --   --  0.7   ALT  --   --  16       Signed: Filipe Ellis NP

## 2021-08-12 NOTE — PROGRESS NOTES
Problem: Pressure Injury - Risk of  Goal: *Prevention of pressure injury  Description: Document Octavio Scale and appropriate interventions in the flowsheet.   Outcome: Progressing Towards Goal  Note: Pressure Injury Interventions:  Sensory Interventions: Assess changes in LOC    Moisture Interventions: Absorbent underpads    Activity Interventions: Increase time out of bed    Mobility Interventions: Assess need for specialty bed    Nutrition Interventions: Document food/fluid/supplement intake

## 2021-08-12 NOTE — PROGRESS NOTES
End of shift report given to the oncoming RN, Derek Gallegos from the outgoing RN, Sophia Ogden. Report included SBAR, MAR, and Kardex. Pt remained stable with no complaints of pain. Hourly rounding and incontinence care complete throughout shift. Pt was a bit confused about discharge plan, told Pt that  would be in to discuss more with her.

## 2021-08-12 NOTE — PROGRESS NOTES
Transition of Care Plan:     RUR: 13%  Disposition: SNF- [facility]   Follow up appointments: PCP, specialists   DME needed: N/A   Transportation at Discharge: [facility] medical transport  Alea Come or means to access home: yes       IM Medicare Letter: to be reviewed prior to d/c  Is patient a BCPI-A Bundle: No                If yes, was Bundle Letter given?:     Caregiver Contact: Tonja Myers (daughter) [phone]  Discharge Caregiver contacted prior to discharge? To be contacted prior to d/c     Chart reviewed. CM continuing to follow for discharge planning. [facility] has accepted pt for SNF placement at time of d/c. CM placed call to daughter, Nola Calderon, today to provide this update. LVM for Nola Calderon and awaiting response. CM then contacted daughter, lForin Ware, whom pt lives with. Able to speak with Florin Ware today and she verbalized understanding of plan. Florin Ware is agreeable to pt going to short term rehab prior to returning home. Pt is not vaccinated and will require covid test prior to d/c. Pt will require medical transport at time of discharge. Florin Ware confirmed that she is pt's primary caregiver at home and noted that pt has had increased difficulty with mobility and falls. Pt was unable to perform a stand pivot transfer the day prior to admission. Met with pt at bedside this AM to further discuss disposition. Pt is not feeling well today w/ increased shortness of breath and reported chest pain. She is agreeable to [facility] for discharge. CM will continue to follow.      Tianna Smith, MSW   Care Manager, [facility]  [phone]

## 2021-08-12 NOTE — PROGRESS NOTES
Physical Therapy    Spoke with NP. She states that pt has had some increased CP with breathing and dimer is elevated. She is being assessed for r/o PE. If neg, NP states she will be d/c'd today to SNF. She asks that we hold off at this time due to testing. Will follow tomorrow as appropriate if pt is not d/c'd.     Valeri Siegel, PT

## 2021-08-12 NOTE — PROGRESS NOTES
End of Shift Note    Bedside shift change report given to Dayan Preston (oncoming nurse) by Kristyn Peterson RN (offgoing nurse). Report included the following information SBAR    Shift worked:  7a-7p     Shift summary and any significant changes: Followed POC as ordered. Patient ID'd with two identifiers. Prioritized safety at all times. Bed alarm plugged in and activated. Pt. oriented to call bell. Practiced infection prevention and hand hygiene. Managed pain as ordered. Clustered care while rounding hourly. Assessed pt., monitored vital signs, and administered medications. Encouraged patient to turn and/or shift weight. Assisted with ADL's, mobility, toileting, and hygiene. Encouraged CHG infection prevention treatment. Catheter care. Provided pt. education and employed therapeutic communication. Collaborated as part of the health care team. Advocated for pt. Monitored I/O's and tracked calorie consumption. Monitored lab values. Obtained/preserved IV access. Turned patient. Pt went for CT. Fall and skin precautions. Pt. on tele. Pt reported chest discomfort at morning assessment; EKG performed. Hypotensive at afternoon assessment; albumin administered. Pt. denied nausea, and SOB. A&OX4. Pt reports numbness in lower extremities. Positive pulses. Active bowel sounds. Lungs clear to diminished bilaterally. Incontinent of urine; pure wick.   Plan to DC tomorrow morning at 10am.     Concerns for physician to address:  hypotension     Zone phone for oncoming shift:   0940       Activity:  Activity Level: Bath Room Privileges  Number times ambulated in hallways past shift: 0  Number of times OOB to chair past shift: 0    Cardiac:   Cardiac Monitoring: Yes      Cardiac Rhythm: Atrial Fib    Access:   Current line(s): PIV     Genitourinary:   Urinary status: external catheter    Respiratory:   O2 Device: None (Room air)  Chronic home O2 use?: NO  Incentive spirometer at bedside: NO     GI:  Last Bowel Movement Date: 08/10/21  Current diet:  ADULT DIET Regular; Low Fat/Low Chol/High Fiber/FLAVIO  Passing flatus: YES  Tolerating current diet: NO       Pain Management:   Patient states pain is manageable on current regimen: YES    Skin:  Octavio Score: 16  Interventions: turn team, speciality bed, float heels, foam dressing, PT/OT consult, limit briefs and internal/external urinary devices    Patient Safety:  Fall Score:  Total Score: 5  Interventions: bed/chair alarm and gripper socks  High Fall Risk: Yes    Length of Stay:  Expected LOS: 4d 0h  Actual LOS: 2      Martin Childers RN

## 2021-08-13 VITALS
HEART RATE: 90 BPM | WEIGHT: 180.78 LBS | SYSTOLIC BLOOD PRESSURE: 150 MMHG | RESPIRATION RATE: 20 BRPM | HEIGHT: 68 IN | TEMPERATURE: 98.9 F | OXYGEN SATURATION: 96 % | DIASTOLIC BLOOD PRESSURE: 80 MMHG | BODY MASS INDEX: 27.4 KG/M2

## 2021-08-13 PROCEDURE — 74011250636 HC RX REV CODE- 250/636: Performed by: NURSE PRACTITIONER

## 2021-08-13 PROCEDURE — 74011250637 HC RX REV CODE- 250/637: Performed by: INTERNAL MEDICINE

## 2021-08-13 PROCEDURE — 91303 HC RX REV CODE- 250/636: CPT | Performed by: NURSE PRACTITIONER

## 2021-08-13 PROCEDURE — 74011250637 HC RX REV CODE- 250/637: Performed by: NURSE PRACTITIONER

## 2021-08-13 RX ORDER — BUMETANIDE 1 MG/1
1 TABLET ORAL DAILY
Status: DISCONTINUED | OUTPATIENT
Start: 2021-08-13 | End: 2021-08-13 | Stop reason: HOSPADM

## 2021-08-13 RX ADMIN — AMIODARONE HYDROCHLORIDE 100 MG: 200 TABLET ORAL at 08:18

## 2021-08-13 RX ADMIN — JNJ-78436735 0.5 DOSE: 50000000000 SUSPENSION INTRAMUSCULAR at 09:40

## 2021-08-13 RX ADMIN — HYDRALAZINE HYDROCHLORIDE 50 MG: 50 TABLET, FILM COATED ORAL at 08:18

## 2021-08-13 RX ADMIN — BUMETANIDE 1 MG: 1 TABLET ORAL at 08:19

## 2021-08-13 RX ADMIN — Medication 10 ML: at 06:00

## 2021-08-13 RX ADMIN — ACETAMINOPHEN 650 MG: 325 TABLET ORAL at 08:18

## 2021-08-13 RX ADMIN — LOSARTAN POTASSIUM 100 MG: 100 TABLET, FILM COATED ORAL at 08:19

## 2021-08-13 NOTE — PROGRESS NOTES
Ms Gerry Chavez was received alert & oriented. EAU, efficacy and signed attestation prior to [facility] IM  administration. Tolerated well, no verbal complaints expressed. Monitored after vaccine 5min and report given to [facility]. Prior approval given by  Juline Closs NP      CDC card provided to Ms Gerry Chavez.

## 2021-08-13 NOTE — PROGRESS NOTES
Problem: Pressure Injury - Risk of  Goal: *Prevention of pressure injury  Description: Document Octavio Scale and appropriate interventions in the flowsheet. Outcome: Resolved/Met     Problem: Patient Education: Go to Patient Education Activity  Goal: Patient/Family Education  Outcome: Resolved/Met     Problem: Falls - Risk of  Goal: *Absence of Falls  Description: Document Ksenia Fall Risk and appropriate interventions in the flowsheet.   Outcome: Resolved/Met     Problem: Patient Education: Go to Patient Education Activity  Goal: Patient/Family Education  Outcome: Resolved/Met     Problem: Patient Education: Go to Patient Education Activity  Goal: Patient/Family Education  Outcome: Resolved/Met     Problem: Patient Education: Go to Patient Education Activity  Goal: Patient/Family Education  Outcome: Resolved/Met

## 2021-08-13 NOTE — DISCHARGE INSTRUCTIONS
Patient Education        Preventing Falls: Care Instructions  Your Care Instructions     Getting around your home safely can be a challenge if you have injuries or health problems that make it easy for you to fall. Loose rugs and furniture in walkways are among the dangers for many older people who have problems walking or who have poor eyesight. People who have conditions such as arthritis, osteoporosis, or dementia also have to be careful not to fall. You can make your home safer with a few simple measures. Follow-up care is a key part of your treatment and safety. Be sure to make and go to all appointments, and call your doctor if you are having problems. It's also a good idea to know your test results and keep a list of the medicines you take. How can you care for yourself at home? Taking care of yourself  · You may get dizzy if you do not drink enough water. To prevent dehydration, drink plenty of fluids. Choose water and other caffeine-free clear liquids. If you have kidney, heart, or liver disease and have to limit fluids, talk with your doctor before you increase the amount of fluids you drink. · Exercise regularly to improve your strength, muscle tone, and balance. Walk if you can. Swimming may be a good choice if you cannot walk easily. · Have your vision and hearing checked each year or any time you notice a change. If you have trouble seeing and hearing, you might not be able to avoid objects and could lose your balance. · Know the side effects of the medicines you take. Ask your doctor or pharmacist whether the medicines you take can affect your balance. Sleeping pills or sedatives can affect your balance. · Limit the amount of alcohol you drink. Alcohol can impair your balance and other senses. · Ask your doctor whether calluses or corns on your feet need to be removed. If you wear loose-fitting shoes because of calluses or corns, you can lose your balance and fall.   · Talk to your doctor if you have numbness in your feet. Preventing falls at home  · Remove raised doorway thresholds, throw rugs, and clutter. Repair loose carpet or raised areas in the floor. · Move furniture and electrical cords to keep them out of walking paths. · Use nonskid floor wax, and wipe up spills right away, especially on ceramic tile floors. · If you use a walker or cane, put rubber tips on it. If you use crutches, clean the bottoms of them regularly with an abrasive pad, such as steel wool. · Keep your house well lit, especially [place], and outside walkways. Use night-lights in areas such as hallways and bathrooms. Add extra light switches or use remote switches (such as switches that go on or off when you clap your hands) to make it easier to turn lights on if you have to get up during the night. · Install sturdy handrails on stairways. · Move items in your cabinets so that the things you use a lot are on the lower shelves (about waist level). · Keep a cordless phone and a flashlight with new batteries by your bed. If possible, put a phone in each of the main rooms of your house, or carry a cell phone in case you fall and cannot reach a phone. Or, you can wear a device around your neck or wrist. You push a button that sends a signal for help. · Wear low-heeled shoes that fit well and give your feet good support. Use footwear with nonskid soles. Check the heels and soles of your shoes for wear. Repair or replace worn heels or soles. · Do not wear socks without shoes on wood floors. · Walk on the grass when the sidewalks are slippery. If you live in an area that gets snow and ice in the winter, sprinkle salt on slippery steps and sidewalks. Preventing falls in the bath  · Install grab bars and nonskid mats inside and outside your shower or tub and near the toilet and sinks. · Use shower chairs and bath benches. · Use a hand-held shower head that will allow you to sit while showering.   · Get into a tub or shower by putting the weaker leg in first. Get out of a tub or shower with your strong side first.  · Repair loose toilet seats and consider installing a raised toilet seat to make getting on and off the toilet easier. · Keep your bathroom door unlocked while you are in the shower. Where can you learn more? Go to http://www.mooney.com/  Enter G117 in the search box to learn more about \"Preventing Falls: Care Instructions. \"  Current as of: December 7, 2020               Content Version: 12.8  © 7943-7802 [employer]. Care instructions adapted under license by [employer] (which disclaims liability or warranty for this information). If you have questions about a medical condition or this instruction, always ask your healthcare professional. [address] any warranty or liability for your use of this information. Patient Education        Heart Failure: Care Instructions  Your Care Instructions     Heart failure occurs when your heart does not pump as much blood as the body needs. Failure does not mean that the heart has stopped pumping but rather that it is not pumping as well as it should. Over time, this causes fluid buildup in your lungs and other parts of your body. Fluid buildup can cause shortness of breath, fatigue, swollen ankles, and other problems. By taking medicines regularly, reducing sodium (salt) in your diet, checking your weight every day, and making lifestyle changes, you can feel better and live longer. Follow-up care is a key part of your treatment and safety. Be sure to make and go to all appointments, and call your doctor if you are having problems. It's also a good idea to know your test results and keep a list of the medicines you take. How can you care for yourself at home? Medicines    · Be safe with medicines. Take your medicines exactly as prescribed.  Call your doctor if you think you are having a problem with your medicine.     · Do not take any vitamins, over-the-counter medicine, or herbal products without talking to your doctor first. Erik Rola not take ibuprofen (Advil or Motrin) and naproxen (Aleve) without talking to your doctor first. They could make your heart failure worse.     · You may take some of the following medicine. ? Angiotensin-converting enzyme inhibitors (ACEIs) or angiotensin II receptor blockers (ARBs) reduce the heart's workload, lower blood pressure, and reduce swelling. Taking an ACEI or ARB may lower your chance of needing to be hospitalized. ? Beta-blockers can slow heart rate, decrease blood pressure, and improve your condition. Taking a beta-blocker may lower your chance of needing to be hospitalized. ? Diuretics, also called water pills, reduce swelling. You will get more details on the specific medicines your doctor prescribes. Diet    · Your doctor may suggest that you limit sodium. Your doctor can tell you how much sodium is right for you. An example is less than 3,000 mg a day. This includes all the salt you eat in cooking or in packaged foods. People get most of their sodium from processed foods. Fast food and restaurant meals also tend to be very high in sodium.     · Ask your doctor how much liquid you can drink each day. You may have to limit liquids. Weight    · Weigh yourself without clothing at the same time each day. Record your weight. Call your doctor if you have a sudden weight gain, such as more than 2 to 3 pounds in a day or 5 pounds in a week. (Your doctor may suggest a different range of weight gain.) A sudden weight gain may mean that your heart failure is getting worse. Activity level    · Start light exercise (if your doctor says it is okay). Even if you can only do a small amount, exercise will help you get stronger, have more energy, and manage your weight and your stress. Walking is an easy way to get exercise.  Start out by walking a little more than you did before. Bit by bit, increase the amount you walk.     · When you exercise, watch for signs that your heart is working too hard. You are pushing yourself too hard if you cannot talk while you are exercising. If you become short of breath or dizzy or have chest pain, stop, sit down, and rest.     · If you feel \"wiped out\" the day after you exercise, walk slower or for a shorter distance until you can work up to a better pace.     · Get enough rest at night. Sleeping with 1 or 2 pillows under your upper body and head may help you breathe easier. Lifestyle changes    · Do not smoke. Smoking can make a heart condition worse. If you need help quitting, talk to your doctor about stop-smoking programs and medicines. These can increase your chances of quitting for good. Quitting smoking may be the most important step you can take to protect your heart.     · Limit alcohol to 2 drinks a day for men and 1 drink a day for women. Too much alcohol can cause health problems.     · Avoid getting sick from colds and the flu. Get a pneumococcal vaccine shot. If you have had one before, ask your doctor whether you need another dose. Get a flu shot each year. If you must be around people with colds or the flu, wash your hands often. When should you call for help? Call 911 if you have symptoms of sudden heart failure such as:    · You have severe trouble breathing.     · You cough up pink, foamy mucus.     · You have a new irregular or rapid heartbeat. Call your doctor now or seek immediate medical care if:    · You have new or increased shortness of breath.     · You are dizzy or lightheaded, or you feel like you may faint.     · You have sudden weight gain, such as more than 2 to 3 pounds in a day or 5 pounds in a week. (Your doctor may suggest a different range of weight gain.)     · You have increased swelling in your legs, ankles, or feet.     · You are suddenly so tired or weak that you cannot do your usual activities. Watch closely for changes in your health, and be sure to contact your doctor if you develop new symptoms. Where can you learn more? Go to http://www.gray.com/  Enter M351 in the search box to learn more about \"Heart Failure: Care Instructions. \"  Current as of: 2020               Content Version: 12.8  © 1730-2359 [employer]. Care instructions adapted under license by [employer] (which disclaims liability or warranty for this information). If you have questions about a medical condition or this instruction, always ask your healthcare professional. [place] [address] any warranty or liability for your use of this information. HOSPITALIST DISCHARGE INSTRUCTIONS    NAME: Abner Dennis   :  1929   MRN:  [id number]     Date/Time:  2021 7:47 AM    ADMIT DATE: 2021   DISCHARGE DATE: 2021     Attending Physician: Xochilt Garibay NP    DISCHARGE DIAGNOSIS:  Acute diastolic congestive heart failure exacerbation   Atrial fibrillation  Dyslipidemia  Hypertension  Debility   Recurrent Falls at Home  Osteoarthritis  History of CVA       Medications: Per above medication reconciliation. Pain Management: per above medications    Recommended diet: Cardiac Diet    Recommended activity: PT/OT Eval and Treat    Wound care: None    Indwelling devices:  None    Supplemental Oxygen: None    Required Lab work: Per SNF routine    Glucose management:  None    Code status: Durable DNR sent with patient      Outside physician follow up:     Follow-up Information     Follow up With Specialties Details Why Contact Info    [address] [place] [address],[address] of [address] [place] [address]    Padmini Lay MD Family Medicine In 1 week  [address]  [address] [address] [phone]                 Skilled nursing facility/ SNF MD responsible for above on discharge. Information obtained by :  I understand that if any problems occur once I am at home I am to contact my physician. I understand and acknowledge receipt of the instructions indicated above.                                                                                                                                            Physician's or R.N.'s Signature                                                                  Date/Time                                                                                                                                              Patient or Repres

## 2021-08-13 NOTE — PROGRESS NOTES
End of Shift Note    Bedside shift change report given to Nolvia White (oncoming nurse) by So Day (offgoing nurse). Report included the following information SBAR, Kardex, ED Summary, OR Summary, Intake/Output, MAR, Accordion and Recent Results    Shift worked:  night     Shift summary and any significant changes:    Pt stable throughout shift. D/C to [facility] today   Concerns for physician to address:    Zone phone for oncoming shift:       Activity:  Activity Level: Up with Assistance  Number times ambulated in hallways past shift: 0  Number of times OOB to chair past shift: 0    Cardiac:   Cardiac Monitoring: Yes      Cardiac Rhythm: Atrial Fib    Access:   Current line(s): PIV     Genitourinary:   Urinary status: voiding and external catheter    Respiratory:   O2 Device: None (Room air)  Chronic home O2 use?: NO  Incentive spirometer at bedside: N/A     GI:  Last Bowel Movement Date: 08/10/21  Current diet:  ADULT DIET Regular; Low Fat/Low Chol/High Fiber/FLAVIO  Passing flatus: YES  Tolerating current diet: YES       Pain Management:   Patient states pain is manageable on current regimen: YES    Skin:  Octavio Score: 16  Interventions: turn team, speciality bed and float heels    Patient Safety:  Fall Score:  Total Score: 6  Interventions: bed/chair alarm, gripper socks, pt to call before getting OOB and stay with me (per policy)  High Fall Risk: Yes    Length of Stay:  Expected LOS: 4d 0h  Actual LOS: [facility]

## 2021-08-13 NOTE — DISCHARGE SUMMARY
Hospitalist Discharge Summary     Patient ID:  Aldo Baxter  [id number]  54 y.o.  12/27/1929 8/9/2021    PCP on record: Trenton Cheek MD    Admit date: 8/9/2021  Discharge date and time: 8/13/2021    DISCHARGE DIAGNOSIS:    Acute diastolic congestive heart failure exacerbation   Atrial fibrillation  Dyslipidemia  Hypertension  Debility   Recurrent Falls at Home  Osteoarthritis  History of CVA     CONSULTATIONS:  None    Excerpted HPI from H&P of Mookie Stephens MD:    Eri Green is a 80 y.o. female who presents with past medical history of congestive heart failure, atrial fibrillation is coming the hospital chief complaints of generalized weakness and also swelling of the lower legs. Patient reports is no reason short of health until about few days ago when she started not feeling well. She reports swelling of the legs along with pain in both the legs. Also reports some shortness of breath along with mild cough but no phlegm. No nausea or vomiting. Does not report any chest pain.     On arrival to ED, blood pressure was elevated at 161/115. On labs WBC was 11.3. BMP was normal.  Troponin was 0.07.  proBNP was 7500. Chest x-ray showed central pulmonary vascular congestion.     We were asked to admit for work up and evaluation of the above problems. ______________________________________________________________________  DISCHARGE SUMMARY/HOSPITAL COURSE:  for full details see H&P, daily progress notes, labs, consult notes. Acute diastolic congestive heart failure exacerbation pBNP 7583  Atrial fibrillation  Dyslipidemia  Hypertension  -CXR shows central pulmonary vascular congestion  -ECHO - EF 55-60%. Age appropriate LV diastolic function. Mild MVR. -CTA Chest - Cardiomegaly with CAD. Small bilateral pleural effusions with mild bibasilar atelectasis.    -Resume PTA PO bumex    -Continue amiodarone, atorvastatin, hydralazine, losartan and xarelto   -No longer SOB, respiratory status has returned to baseline   -Discharge today to [address] of [place] 8[facility]12  -Troponin negative   -EKG with no changes   -CTA negative no PE   Debility   Recurrent Falls at [address] with daughter  -Appreciate PT/OT evaluations - recommend SNF  -Appreciate CM - set up transfer to SNF   Osteoarthritis  History of CVA Continue Xarelto     Patient seen at bedside. Patient's plan of care has been reviewed with them. Patient and/or family have verbally conveyed their understanding and agreement of the patient's signs, symptoms, diagnosis, treatment and prognosis and additionally agree to follow up as recommended or return to [facility] should their condition change prior to follow-up. Discharge instructions have also been provided to the patient with some educational information regarding their diagnosis as well a list of reasons why they would want to return to the office prior to their follow-up appointment should their condition change.    _______________________________________________________________________  Patient seen and examined by me on discharge day. Pertinent Findings:  Gen:    Not in distress  Chest: LS diminished at bases   CVS:   Regular rhythm. No edema  Abd:  Soft, obese, not tender  Neuro:  Alert and oriented x3  _______________________________________________________________________  DISCHARGE MEDICATIONS:   Current Discharge Medication List      CONTINUE these medications which have NOT CHANGED    Details   rivaroxaban (XARELTO) 20 mg tab tablet Take 20 mg by mouth daily. bumetanide (BUMEX) 1 mg tablet Take 1 Tab by mouth daily. Qty: 30 Tab, Refills: 2      amiodarone (PACERONE) 100 mg tablet Take 1 Tab by mouth daily. Qty: 30 Tab, Refills: 2      hydrALAZINE (APRESOLINE) 50 mg tablet Take 50 mg by mouth two (2) times a day. losartan (COZAAR) 100 mg tablet Take 100 mg by mouth daily.       Cholecalciferol, Vitamin D3, (VITAMIN D3) 2,000 unit cap capsule Take 2,000 Units by mouth daily. nitroglycerin (NITROSTAT) 0.4 mg SL tablet 0.4 mg by SubLINGual route every five (5) minutes as needed for Chest Pain. atorvastatin (LIPITOR) 20 mg tablet Take 40 mg by mouth nightly. Patient Follow Up Instructions:    Activity: PT/OT Eval and Treat  Diet: Cardiac Diet  Wound Care: None needed    Follow-up Information     Follow up With Specialties Details Why Contact Info    [facility],[address]    Mark Austin MD Family Medicine In 1 week  [address]  [address] [address] [phone]          ________________________________________________________________    Risk of deterioration: Low    Condition at Discharge:  Stable  __________________________________________________________________    Disposition  SNF/LTC    ____________________________________________________________________    Code Status: DNR/DNI  ___________________________________________________________________      Total time in minutes spent coordinating this discharge (includes going over instructions, follow-up, prescriptions, and preparing report for sign off to her PCP) :  >30 minutes    Signed:  Deb Calderon NP

## 2021-08-13 NOTE — PROGRESS NOTES
Transition of Care Plan:     RUR: 13%  [facility]  Follow up appointments: PCP, specialists   DME needed: N/A   Transportation at . [address] transport, ETA 10AM  Keys or means to access home: yes       IM Medicare Letter: reviewed on 8/13/21  Is patient a BCPI-A Bundle: No                If yes, was Bundle Letter given?:     Caregiver Contact: Tere Laurent (daughter) [phone]  Discharge Caregiver contacted prior to discharge? yes    CM aware of discharge order. Pt discharging to [facility]. Nurse to call report to . CM arranged for BLS transport via [facility], ETA given was 10AM and then delayed until 10:45AM. CM completed PCS form to include copy of H&P, DDNR, and facesheet which were placed into chart. Met with pt at bedside to finalize and review d/c plan. Medicare pt has received, reviewed, and signed 2nd  letter informing them of their right to appeal the discharge. Signed copy has been placed on pt bedside chart. Pt provided with copy of letter to keep. [address] letter signed. Scheduling of appointments deferred at this time due to rehab placement. No further CM needs identified at this time. Pt is ready for d/c from a CM standpoint. Assigned RN informed. Care Management Interventions  PCP Verified by CM:  Yes  Last Visit to PCP: 07/21/21  Palliative Care Criteria Met (RRAT>21 & CHF Dx)?: No  Mode of Transport at Discharge: BLS ([facility])  Transition of Care Consult (CM Consult): Discharge Planning  Discharge Durable Medical Equipment: No  Physical Therapy Consult: Yes  Occupational Therapy Consult: Yes  Speech Therapy Consult: No  Current Support Network: Relative's Home (lives with daughter)  Confirm Follow Up Transport: Family  The Plan for Transition of Care is Related to the Following Treatment Goals : SNF  The Patient and/or Patient Representative was Provided with a Choice of Provider and Agrees with the Discharge Plan?: Yes  Freedom of Choice List was Provided with Basic Dialogue that Supports the Patient's Individualized Plan of Care/Goals, Treatment Preferences and Shares the Quality Data Associated with the Providers?: Yes  [place] Resource Information Provided?: No  Discharge Location  Discharge Placement: Skilled nursing facility ([address] [place] [address],[address] Floor )    ADRIANE Rascon   Care Manager, [address] [facility]  [phone]

## 2021-08-13 NOTE — PROGRESS NOTES
I have reviewed discharge instructions with the patient. The patient verbalized understanding. Opportunities for questions was given. Pt with belongings. Report called to [facility]. Pt discharged with dmitry.

## 2021-08-13 NOTE — PROGRESS NOTES
Transition of Care Plan to SNF/Rehab    Communication to Patient/Family:  Met with patient and family and they are agreeable to the transition plan. The Plan for Transition of Care is related to the following treatment goals: SNF/rehab placement     The Patient and/or patient representative was provided with a choice of provider and agrees  with the discharge plan. Yes [x] No []    A Freedom of choice list was provided with basic dialogue that supports the patient's individualized plan of care/goals and shares the quality data associated with the providers. Yes [x] No []    SNF/Rehab Transition:  Patient has been accepted to [address] [place] [address],[address] of [address] and meets criteria for admission. Patient will transported by [facility] and expected to leave at 10AM.    Communication to SNF/Rehab:  Bedside RN, Michael Cullen, has been notified to update the transition plan to the facility and call report (). Discharge information has been updated on the AVS. And communicated to facility via CC link. Discharge instructions to be fax'd to facility at (f) [phone]    Nursing Please include all hard scripts for controlled substances, med rec and dc summary, and AVS in packet. Reviewed and confirmed with facility, Chastity at [address] [place] [address],[address], can manage the patient care needs for the following:     Susan Vega with (X) only those applicable:  Medication:  [x]Medications are available at the facility  []IV Antibiotics    []Controlled Substance  hard copies available sent.   []Weekly Labs    Equipment:  []CPAP/BiPAP  []Wound Vacuum  []Saldivar or Urinary Device  []PICC/Central Line  []Nebulizer  []Ventilator    Treatment:  []Isolation (for MRSA, VRE, etc.)  []Surgical Drain Management  []Tracheostomy Care  []Dressing Changes  []Dialysis with transportation  []PEG Care  []Oxygen  [x]Daily Weights for Heart Failure    Dietary:  []Any diet limitations  []Tube Feedings   []Total Parenteral Management (TPN)    Financial Resources:  []Medicaid Application Completed    []UAI Completed and copy given to pt/family  and copy given to pt/family  []A screening has previously been completed. []Level II Completed    [x] Private pay individual who will not become   financially eligible for Medicaid within 6 months from admission to a [address] [place] [address] facility. [] Individual refused to have screening conducted. []Medicaid Application Completed    []The screening denied because it was determined individual did not need/did not qualify for nursing facility level of care. [] Out of state residents seeking direct admission to a [address] facility. [] Individuals who are inpatients of an out of state hospital, or in state or out of state veterans/ hospital and seek direct admission to a [address] facility  [] Individuals who are pateints or residents of a state owned/operated facility that is licensed by Department of [employer] ([employer]) and seek direct admission to [address] [place] [address]  [] A screening not required for enrollment in [facility] services as set out in [address] [place] [address] 76-  [] [facility] [place]) staff shall perform screenings of the [facility] clients. Advanced Care Plan:  []Surrogate Decision Maker of Care  []POA  [x]Communicated Code Status and copy sent.     Other:         ADRIANE Desai   Care Manager, [facility]  [phone]

## 2021-08-13 NOTE — PROGRESS NOTES
Problem: Falls - Risk of  Goal: *Absence of Falls  Description: Document Rani Mitchell Fall Risk and appropriate interventions in the flowsheet.   Outcome: Progressing Towards Goal  Note: Fall Risk Interventions:  Mobility Interventions: Bed/chair exit alarm, Communicate number of staff needed for ambulation/transfer, Patient to call before getting OOB         Medication Interventions: Bed/chair exit alarm, Patient to call before getting OOB, Teach patient to arise slowly    Elimination Interventions: Bed/chair exit alarm, Call light in reach    History of Falls Interventions: Bed/chair exit alarm

## 2021-08-16 ENCOUNTER — PATIENT OUTREACH (OUTPATIENT)
Dept: CASE MANAGEMENT | Age: 86
End: 2021-08-16

## 2021-08-16 NOTE — PROGRESS NOTES
Transition of care outreach postponed for 14 days due to patient's discharge to SNF.   ED [facility] 8/9-8/13/21 CHF exacerbation d/c [facility] care f/u 14d

## 2021-08-19 LAB
ATRIAL RATE: 55 BPM
CALCULATED R AXIS, ECG10: -18 DEGREES
CALCULATED T AXIS, ECG11: 35 DEGREES
DIAGNOSIS, 93000: NORMAL
Q-T INTERVAL, ECG07: 434 MS
QRS DURATION, ECG06: 88 MS
QTC CALCULATION (BEZET), ECG08: 488 MS
VENTRICULAR RATE, ECG03: 76 BPM

## 2021-08-26 ENCOUNTER — PATIENT OUTREACH (OUTPATIENT)
Dept: CASE MANAGEMENT | Age: 86
End: 2021-08-26

## 2021-08-30 NOTE — PROGRESS NOTES
Post Acute Facility Update     *The information contained in this note was received during a weekly care coordination call with the post acute facility*    Current Facility: [address] [place] [address], [facility] ([facility])  Anticipated Discharge Date: [facility]    Facility Nursing Update: Resident reports continued dysuria. Urine culture noted from 08/20/21 growing E. coli with greater than 100,000 CFU, resident started on Cefdnir for UTI on 8/23/21 for 5 days. 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 Nursing staff reports no other acute new concerns. Resident is participating with rehab therapy as tolerated. Facility Social Work Update:  Resident is admitted under skilled care and plans to return home once goals are met. Resident go back home with her youngest daughter in a 1 level house w/o steps to enter the garage per resident. SS will assist as needed with discharge planning such as home health and equipment needs. Bundle Program Status: none  Upper Extremity Assistance: Minimal Assistance   Lower Extremity Assistance: Mod/Max Assistance  Bed Mobility: Minimal Assistance   Transfers: Min/Mod Assistance  Ambulation: Minimal Assistance   How far (in feet) is the patient ambulating?  8 ft  Device Used: walker  Barriers to Discharge: STEFFI ZARAGOZAN, RN  [facility]

## 2021-09-02 ENCOUNTER — PATIENT OUTREACH (OUTPATIENT)
Dept: CASE MANAGEMENT | Age: 86
End: 2021-09-02

## 2021-09-03 NOTE — PROGRESS NOTES
Post Acute Facility Update     *The information contained in this note was received during a weekly care coordination call with the post acute facility*    Current Facility: [address] [place] [address] ([facility])  Anticipated Discharge Date: [facility]    Facility Nursing Update:  Resident reports continued dysuria. Urine culture noted from 08/20/21 growing E. coli with greater than 100,000 CFU, resident started on Cefdnir for UTI until 9/3/21. 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 Nursing staff reports no other acute new concerns. Resident is participating with rehab therapy as tolerated. Facility Social Work Update: Resident is admitted under skilled care and plans to return home once goals are met. Resident go back home with her youngest daughter in a 1 level house w/o steps to enter the garage per resident. SS will assist as needed with discharge planning such as home health and equipment needs. Bundle Program Status: none  Upper Extremity Assistance: Stand by Assist - Presence and Cueing  Lower Extremity Assistance: Mod/Max Assistance  Bed Mobility: Minimal Assistance   Transfers: Minimal Assistance   Ambulation: Contact Guard Assist - hands on patient for balance   How far (in feet) is the patient ambulating?  8  Device Used: walker  Barriers to Discharge: STEFFI Asher MSW  [place]

## 2021-09-09 ENCOUNTER — PATIENT OUTREACH (OUTPATIENT)
Dept: CASE MANAGEMENT | Age: 86
End: 2021-09-09

## 2021-09-10 ENCOUNTER — PATIENT OUTREACH (OUTPATIENT)
Dept: CASE MANAGEMENT | Age: 86
End: 2021-09-10

## 2021-09-10 NOTE — PROGRESS NOTES
Transition of care outreach postponed for 14 days due to patient's discharge to SNF.   Still admitted f/u 4d
.

## 2021-09-10 NOTE — PROGRESS NOTES
Post Acute Facility Update     *The information contained in this note was received during a weekly care coordination call with the post acute facility*    Current Facility: [address] [place] [address], [facility] ([facility])  Anticipated Discharge Date: D    Facility Nursing Update:  Resident reports continued intermittent dysuria, but overall has improved. Resident completed oral Augmentin course on 9/03/21. Resident is able to participate with rehab therapy and ambulate with a walker  although reports that her knees give way. Facility Social Work Update:  Resident is admitted under skilled care and plans to return home once goals are met. Resident go back home with her youngest daughter in a 1 level house w/o steps to enter the garage per resident. SS will assist as needed with discharge planning such as home health and equipment needs. Bundle Program Status: none  Upper Extremity Assistance: Stand by Assist - Presence and Cueing  Lower Extremity Assistance: Moderate Assistance   Bed Mobility: Contact Guard Assist - hands on patient for balance   Transfers: Minimal Assistance   Ambulation: Minimal Assistance   How far (in feet) is the patient ambulating?  15 ft x2  Device Used: walker  Barriers to Discharge: D    Janis Carbone   BSN, RN  [employer]

## 2021-09-14 ENCOUNTER — PATIENT OUTREACH (OUTPATIENT)
Dept: CASE MANAGEMENT | Age: 86
End: 2021-09-14

## 2021-09-16 ENCOUNTER — PATIENT OUTREACH (OUTPATIENT)
Dept: CASE MANAGEMENT | Age: 86
End: 2021-09-16

## 2021-09-16 NOTE — PROGRESS NOTES
Post Acute Facility Update     *The information contained in this note was received during a weekly care coordination call with the post acute facility*    Current Facility: [address] [place] [address], [place] ([facility])  Anticipated Discharge Date: [facility]    Facility Nursing Update: Resident reports continued intermittent dysuria and feels like UTI, U/A and C&S ordered and pending. No nausea and abd pain. Resident completed oral Augmentin course on 9/03/21. Resident is able to participate with rehab therapy and ambulate with a walker  although reports that her knees give way. UA and CNS ordered. Facility Social Work Update: Resident is admitted under skilled care and plans to return home once goals are met. Resident go back home with her youngest daughter in a 1 level house w/o steps to enter the garage per resident. SS will assist as needed with discharge planning such as home health and equipment needs. Bundle Program Status: none  Upper Extremity Assistance: Stand by Assist - Presence and Cueing  Lower Extremity Assistance: Maximum Assistance  Bed Mobility: Contact Guard Assist - hands on patient for balance   Transfers: Contact Guard Assist - hands on patient for balance   Ambulation: Contact Guard Assist - hands on patient for balance   How far (in feet) is the patient ambulating?  30  Device Used: Walker  Barriers to Discharge: ADRIANE Ma

## 2021-09-23 ENCOUNTER — PATIENT OUTREACH (OUTPATIENT)
Dept: CASE MANAGEMENT | Age: 86
End: 2021-09-23

## 2021-09-23 NOTE — PROGRESS NOTES
Post Acute Facility Update     *The information contained in this note was received during a weekly care coordination call with the post acute facility*    Current Facility: [address] [place] [address] ([facility])  Anticipated Discharge Date: 2 weeks, date TBD    Facility Nursing Update: Resident reports continued intermittent dysuria. Urinalysis obtained from 9/17/21 with positive finding for E. coli. Resident is on Augmentin until 9/29/21. Resident remains afebrile and denies any nausea or vomiting. Resident is participating with rehab therapy as tolerated. Nursing staff reports no other acute new concerns. Facility Social Work Update:  Resident is admitted under skilled care and plans to return home once goals are met. Resident go back home with her youngest daughter in a 1 level house w/o steps to enter the garage per resident. SS will assist as needed with discharge planning such as home health and equipment needs. Bundle Program Status: none  Upper Extremity Assistance: Stand by Assist - Presence and Cueing  Lower Extremity Assistance: Min/Mod Assistance  Bed Mobility: Contact Guard Assist - hands on patient for balance   Transfers: Minimal Assistance   Ambulation: Contact Guard Assist - hands on patient for balance   How far (in feet) is the patient ambulating?  20  Device Used: walker  Barriers to Discharge: STEFFI ZARAGOZAN, RN  [employer]

## 2021-09-30 ENCOUNTER — PATIENT OUTREACH (OUTPATIENT)
Dept: CASE MANAGEMENT | Age: 86
End: 2021-09-30

## 2021-10-01 NOTE — PROGRESS NOTES
Post Acute Facility Update     *The information contained in this note was received during a weekly care coordination call with the post acute facility*    Current Facility: [address] [place] [address] ([facility])  Anticipated Discharge Date: [facility]    Facility Nursing Update:  Resident reports continued intermittent dysuria. Urinalysis obtained from 9/17/21 with positive finding for E. coli. Resident completed Augmentin on 9/29/21. Resident remains afebrile and denies any nausea or vomiting. Resident is participating with rehab therapy as tolerated. Nursing staff reports no other acute new concerns. Facility Social Work Update:  Resident reports continued intermittent dysuria. Urinalysis obtained from 9/17/21 with positive finding for E. coli. Resident completed Augmentin on 9/29/21. Resident remains afebrile and denies any nausea or vomiting. Resident is participating with rehab therapy as tolerated. Nursing staff reports no other acute new concerns. Bundle Program Status: none  Upper Extremity Assistance: Stand by Assist - Presence and Cueing  Lower Extremity Assistance: Minimal Assistance   Bed Mobility: Stand by Assist - Presence and Cueing  Transfers: Minimal Assistance   Ambulation: Contact Guard Assist - hands on patient for balance   How far (in feet) is the patient ambulating?  30  Device Used:walker  Barriers to Discharge: STEFFI TALAVERA, RN  [facility]

## 2021-10-07 ENCOUNTER — PATIENT OUTREACH (OUTPATIENT)
Dept: CASE MANAGEMENT | Age: 86
End: 2021-10-07

## 2021-10-08 ENCOUNTER — HOME HEALTH ADMISSION (OUTPATIENT)
Dept: HOME HEALTH SERVICES | Facility: HOME HEALTH | Age: 86
End: 2021-10-08
Payer: MEDICARE

## 2021-10-09 ENCOUNTER — HOME CARE VISIT (OUTPATIENT)
Dept: SCHEDULING | Facility: HOME HEALTH | Age: 86
End: 2021-10-09
Payer: MEDICARE

## 2021-10-09 PROCEDURE — 400018 HH-NO PAY CLAIM PROCEDURE

## 2021-10-09 PROCEDURE — 3331090001 HH PPS REVENUE CREDIT

## 2021-10-09 PROCEDURE — 3331090002 HH PPS REVENUE DEBIT

## 2021-10-09 PROCEDURE — G0299 HHS/HOSPICE OF RN EA 15 MIN: HCPCS

## 2021-10-09 PROCEDURE — 400013 HH SOC

## 2021-10-09 NOTE — PROGRESS NOTES
Post Acute Facility Update     *The information contained in this note was received during a weekly care coordination call with the post acute facility*    Current Facility: [address] [place] [address], [place] ([facility])  Anticipated Discharge Date: 10/8/21    Facility Nursing Update: Resident reports she feels good and had no complaints of Abdominal pain or dysuria. Excited about going home on Friday 10/8/21. Ted Dinero Resident remains afebrile and denies any nausea or vomiting. Resident is participating with rehab therapy as tolerated  Reji He Work Update: SS spoke with resident's daughter [place] regarding [facility] with last covered day as 10/7/21 with discharge or financial liability to begin on 10/7/21. Resident will discharge home with [address] and her youngest daughter on 10/7/21. Resident lives in a 1 level house w/o steps to enter. Bundle Program Status: none  Upper Extremity Assistance: Independent  Lower Extremity Assistance: Contact Guard Assist - hands on patient for balance   Bed Mobility: Stand by Assist - Presence and Cueing  Transfers: Minimal Assistance   Ambulation: Contact Guard Assist - hands on patient for balance   How far (in feet) is the patient ambulating? 30 ft   Device Used: walker  Barriers to Discharge: none.     Axel ZARAGOZAN, RN  [employer]

## 2021-10-10 VITALS
DIASTOLIC BLOOD PRESSURE: 78 MMHG | RESPIRATION RATE: 20 BRPM | OXYGEN SATURATION: 100 % | SYSTOLIC BLOOD PRESSURE: 146 MMHG | TEMPERATURE: 98.8 F | HEART RATE: 70 BPM

## 2021-10-10 PROCEDURE — 3331090002 HH PPS REVENUE DEBIT

## 2021-10-10 PROCEDURE — 3331090001 HH PPS REVENUE CREDIT

## 2021-10-11 ENCOUNTER — HOME CARE VISIT (OUTPATIENT)
Dept: HOME HEALTH SERVICES | Facility: HOME HEALTH | Age: 86
End: 2021-10-11
Payer: MEDICARE

## 2021-10-11 ENCOUNTER — HOME CARE VISIT (OUTPATIENT)
Dept: SCHEDULING | Facility: HOME HEALTH | Age: 86
End: 2021-10-11
Payer: MEDICARE

## 2021-10-11 PROCEDURE — 3331090001 HH PPS REVENUE CREDIT

## 2021-10-11 PROCEDURE — G0151 HHCP-SERV OF PT,EA 15 MIN: HCPCS

## 2021-10-11 PROCEDURE — 3331090002 HH PPS REVENUE DEBIT

## 2021-10-12 ENCOUNTER — HOME CARE VISIT (OUTPATIENT)
Dept: HOME HEALTH SERVICES | Facility: HOME HEALTH | Age: 86
End: 2021-10-12
Payer: MEDICARE

## 2021-10-12 VITALS
RESPIRATION RATE: 18 BRPM | OXYGEN SATURATION: 95 % | HEART RATE: 80 BPM | DIASTOLIC BLOOD PRESSURE: 76 MMHG | SYSTOLIC BLOOD PRESSURE: 140 MMHG | TEMPERATURE: 97.6 F

## 2021-10-12 PROCEDURE — 3331090002 HH PPS REVENUE DEBIT

## 2021-10-12 PROCEDURE — 3331090001 HH PPS REVENUE CREDIT

## 2021-10-13 ENCOUNTER — HOME CARE VISIT (OUTPATIENT)
Dept: SCHEDULING | Facility: HOME HEALTH | Age: 86
End: 2021-10-13
Payer: MEDICARE

## 2021-10-13 PROCEDURE — 3331090001 HH PPS REVENUE CREDIT

## 2021-10-13 PROCEDURE — G0300 HHS/HOSPICE OF LPN EA 15 MIN: HCPCS

## 2021-10-13 PROCEDURE — G0156 HHCP-SVS OF AIDE,EA 15 MIN: HCPCS

## 2021-10-13 PROCEDURE — G0157 HHC PT ASSISTANT EA 15: HCPCS

## 2021-10-13 PROCEDURE — 3331090002 HH PPS REVENUE DEBIT

## 2021-10-14 ENCOUNTER — PATIENT OUTREACH (OUTPATIENT)
Dept: CASE MANAGEMENT | Age: 86
End: 2021-10-14

## 2021-10-14 ENCOUNTER — HOME CARE VISIT (OUTPATIENT)
Dept: SCHEDULING | Facility: HOME HEALTH | Age: 86
End: 2021-10-14
Payer: MEDICARE

## 2021-10-14 VITALS
TEMPERATURE: 98 F | SYSTOLIC BLOOD PRESSURE: 131 MMHG | HEART RATE: 81 BPM | DIASTOLIC BLOOD PRESSURE: 71 MMHG | OXYGEN SATURATION: 96 %

## 2021-10-14 PROCEDURE — 3331090002 HH PPS REVENUE DEBIT

## 2021-10-14 PROCEDURE — G0157 HHC PT ASSISTANT EA 15: HCPCS

## 2021-10-14 PROCEDURE — G0152 HHCP-SERV OF OT,EA 15 MIN: HCPCS

## 2021-10-14 PROCEDURE — 3331090001 HH PPS REVENUE CREDIT

## 2021-10-14 NOTE — PROGRESS NOTES
[address] [place] [address] Dr Discharge Note    *The information contained in this note was received during a weekly care coordination call with the post acute facility*      Patient was discharged from [address] [place] [address] ([facility]) on 10/8/2021.     PCP: MD GATITO Hernandez appointment: PCP appt scheduled for 10/12/21 at 11:15am  Future Appointments   Date Time Provider Bib Sarah   10/15/2021 To Be Determined Verle Ted, LPN [address] [facility]   10/15/2021 12:00 PM Yemi Toro, CNA [facility]   10/15/2021 12:00 PM Josseline Lock PTA [facility]   10/18/2021 To Be Determined Verle Ted, LPN [facility]   10/18/2021 To Be Determined Jacklyn Abram [facility]   10/19/2021 To Be Determined Yemi Toro [place] [address] [facility]   10/20/2021 To Be Determined Verle Ted, LPN [facility]   10/20/2021 To Be Determined Jacklyn Abram [place] [address] [facility]   10/21/2021 To Be Determined Yemi Toro [address]   10/22/2021 To Be Determined Verle Ted, LPN [facility]   10/22/2021 To Be Determined Jacklyn Abram [facility]   10/25/2021 To Be Determined Verle Ted, LPN [facility]   10/26/2021 To Be Determined Jacklyn Abram [facility]   10/26/2021 To Be Determined Yemi Toro [place] [address] [facility]   10/27/2021 To Be Determined Verle Ted, LPN [address] [facility]   10/28/2021 To Be Determined Jacklyn Abram [facility]   10/28/2021 To Be Determined Yemi Toro [place] [address] [facility]   11/1/2021 To Be Determined Jacklyn Abram [facility]   11/1/2021 To Be Determined Verle Ted, LPN [facility]   11/2/2021 To Be Determined Yemi Toro [place] [id number]   11/3/2021 To Be Determined Dylan Jean LPN [address] [facility]   11/4/2021 To Be Determined Mat Robles, PT [place] [id number] [facility] 11/4/2021 To Be Determined Wynette Olu,  [facility],[address] [place], [place] [address] [address]   11/8/2021 To Be Determined Brody San LPN [facility]   11/9/2021 To Be Determined Knute Uribe [address] [facility],[address] [place] [address] [facility]   11/9/2021 To Be Determined Wynette Olu,  [facility],[address] [place] [address] [facility]   11/11/2021 To Be Determined Knute Uribe [address] [facility],[address] [place] [address]   11/11/2021 To Be Determined Wynette Olu,  [facility],[address] [place] [address] [address]   11/15/2021 To Be Determined Brody San LPN [facility]   11/16/2021 To Be Determined Knute Uribe [address] [facility],[address] [place] [address]   11/16/2021 To Be Determined Wyrobby Raines,  [facility],[address] [place] [address] [facility]   11/18/2021 To Be Determined Knute Uribe [address] [facility],[address] [place] [address]   11/22/2021 To Be Determined Brody San LPN [address] [facility]   11/23/2021 To Be Determined Knute Uribe [address] [facility],[address] [place] [address]   11/23/2021 To Be Determined Wyrobby Raines,  [facility],[address] [place] [address] [facility]   11/25/2021 To Be Determined Knute Uribe [address] [facility],[address] [place] [address]   11/29/2021 To Be Determined Brody San LPN [address] [facility]   11/30/2021 To Be Determined Lafaye Garden [address] [facility],[address] [place] [address] [facility]   11/30/2021 To Be Determined Wynette Olu,  [facility],[address] [place] [address]   12/2/2021 To Be Determined Sharla Echols,  [facility],[address] [place] [address]   12/6/2021 To Be Determined Vira Parra  [facility],[address] [place] [address] [address]   12/7/2021 To Be Determined Krissy Raines CNA [address] [facility]       Home Health/Outpatient orders at discharge: home health care  [address] [place] [address] Way: [facility] ordered at discharge: None  [address] received prior to discharge: [facility] Team will sign off at this time. Medications were not reconciled and general patient assessment was not completed during this skilled nursing facility outreach.      Deshawn Johnson, ADRIANE  [facility] Team

## 2021-10-15 ENCOUNTER — HOME CARE VISIT (OUTPATIENT)
Dept: HOME HEALTH SERVICES | Facility: HOME HEALTH | Age: 86
End: 2021-10-15
Payer: MEDICARE

## 2021-10-15 ENCOUNTER — HOME CARE VISIT (OUTPATIENT)
Dept: SCHEDULING | Facility: HOME HEALTH | Age: 86
End: 2021-10-15
Payer: MEDICARE

## 2021-10-15 VITALS
DIASTOLIC BLOOD PRESSURE: 68 MMHG | TEMPERATURE: 97.8 F | RESPIRATION RATE: 18 BRPM | SYSTOLIC BLOOD PRESSURE: 124 MMHG | OXYGEN SATURATION: 97 % | HEART RATE: 81 BPM

## 2021-10-15 PROCEDURE — G0156 HHCP-SVS OF AIDE,EA 15 MIN: HCPCS

## 2021-10-15 PROCEDURE — 3331090001 HH PPS REVENUE CREDIT

## 2021-10-15 PROCEDURE — 3331090002 HH PPS REVENUE DEBIT

## 2021-10-16 PROCEDURE — 3331090001 HH PPS REVENUE CREDIT

## 2021-10-16 PROCEDURE — 3331090002 HH PPS REVENUE DEBIT

## 2021-10-17 VITALS
TEMPERATURE: 98.2 F | OXYGEN SATURATION: 96 % | TEMPERATURE: 98.1 F | SYSTOLIC BLOOD PRESSURE: 124 MMHG | DIASTOLIC BLOOD PRESSURE: 78 MMHG | HEART RATE: 75 BPM | HEART RATE: 70 BPM | OXYGEN SATURATION: 97 % | SYSTOLIC BLOOD PRESSURE: 112 MMHG | DIASTOLIC BLOOD PRESSURE: 74 MMHG

## 2021-10-17 PROCEDURE — 3331090001 HH PPS REVENUE CREDIT

## 2021-10-17 PROCEDURE — 3331090002 HH PPS REVENUE DEBIT

## 2021-10-18 ENCOUNTER — HOME CARE VISIT (OUTPATIENT)
Dept: SCHEDULING | Facility: HOME HEALTH | Age: 86
End: 2021-10-18
Payer: MEDICARE

## 2021-10-18 ENCOUNTER — HOME CARE VISIT (OUTPATIENT)
Dept: HOME HEALTH SERVICES | Facility: HOME HEALTH | Age: 86
End: 2021-10-18
Payer: MEDICARE

## 2021-10-18 VITALS
HEART RATE: 73 BPM | RESPIRATION RATE: 20 BRPM | TEMPERATURE: 98.1 F | OXYGEN SATURATION: 98 % | DIASTOLIC BLOOD PRESSURE: 70 MMHG | SYSTOLIC BLOOD PRESSURE: 138 MMHG

## 2021-10-18 PROCEDURE — G0157 HHC PT ASSISTANT EA 15: HCPCS

## 2021-10-18 PROCEDURE — 3331090002 HH PPS REVENUE DEBIT

## 2021-10-18 PROCEDURE — 3331090001 HH PPS REVENUE CREDIT

## 2021-10-18 PROCEDURE — G0152 HHCP-SERV OF OT,EA 15 MIN: HCPCS

## 2021-10-18 PROCEDURE — G0299 HHS/HOSPICE OF RN EA 15 MIN: HCPCS

## 2021-10-19 ENCOUNTER — HOME CARE VISIT (OUTPATIENT)
Dept: SCHEDULING | Facility: HOME HEALTH | Age: 86
End: 2021-10-19
Payer: MEDICARE

## 2021-10-19 VITALS
TEMPERATURE: 97.9 F | HEART RATE: 87 BPM | SYSTOLIC BLOOD PRESSURE: 120 MMHG | DIASTOLIC BLOOD PRESSURE: 68 MMHG | OXYGEN SATURATION: 98 %

## 2021-10-19 PROCEDURE — G0156 HHCP-SVS OF AIDE,EA 15 MIN: HCPCS

## 2021-10-19 PROCEDURE — 3331090001 HH PPS REVENUE CREDIT

## 2021-10-19 PROCEDURE — G0157 HHC PT ASSISTANT EA 15: HCPCS

## 2021-10-19 PROCEDURE — 3331090002 HH PPS REVENUE DEBIT

## 2021-10-20 VITALS
HEART RATE: 74 BPM | SYSTOLIC BLOOD PRESSURE: 122 MMHG | TEMPERATURE: 97.8 F | OXYGEN SATURATION: 95 % | DIASTOLIC BLOOD PRESSURE: 80 MMHG

## 2021-10-20 VITALS
HEART RATE: 87 BPM | DIASTOLIC BLOOD PRESSURE: 78 MMHG | RESPIRATION RATE: 18 BRPM | OXYGEN SATURATION: 98 % | SYSTOLIC BLOOD PRESSURE: 120 MMHG | TEMPERATURE: 97.9 F

## 2021-10-20 PROCEDURE — 3331090002 HH PPS REVENUE DEBIT

## 2021-10-20 PROCEDURE — 3331090001 HH PPS REVENUE CREDIT

## 2021-10-21 ENCOUNTER — HOME CARE VISIT (OUTPATIENT)
Dept: SCHEDULING | Facility: HOME HEALTH | Age: 86
End: 2021-10-21
Payer: MEDICARE

## 2021-10-21 PROCEDURE — A6212 FOAM DRG <=16 SQ IN W/BORDER: HCPCS

## 2021-10-21 PROCEDURE — G0152 HHCP-SERV OF OT,EA 15 MIN: HCPCS

## 2021-10-21 PROCEDURE — 3331090001 HH PPS REVENUE CREDIT

## 2021-10-21 PROCEDURE — G0156 HHCP-SVS OF AIDE,EA 15 MIN: HCPCS

## 2021-10-21 PROCEDURE — G0300 HHS/HOSPICE OF LPN EA 15 MIN: HCPCS

## 2021-10-21 PROCEDURE — 3331090002 HH PPS REVENUE DEBIT

## 2021-10-22 ENCOUNTER — HOME CARE VISIT (OUTPATIENT)
Dept: SCHEDULING | Facility: HOME HEALTH | Age: 86
End: 2021-10-22
Payer: MEDICARE

## 2021-10-22 ENCOUNTER — HOME CARE VISIT (OUTPATIENT)
Dept: HOME HEALTH SERVICES | Facility: HOME HEALTH | Age: 86
End: 2021-10-22
Payer: MEDICARE

## 2021-10-22 VITALS
SYSTOLIC BLOOD PRESSURE: 118 MMHG | OXYGEN SATURATION: 97 % | RESPIRATION RATE: 18 BRPM | HEART RATE: 87 BPM | DIASTOLIC BLOOD PRESSURE: 68 MMHG | TEMPERATURE: 97.2 F

## 2021-10-22 PROCEDURE — 3331090002 HH PPS REVENUE DEBIT

## 2021-10-22 PROCEDURE — 3331090001 HH PPS REVENUE CREDIT

## 2021-10-22 PROCEDURE — G0157 HHC PT ASSISTANT EA 15: HCPCS

## 2021-10-23 PROCEDURE — 3331090001 HH PPS REVENUE CREDIT

## 2021-10-23 PROCEDURE — 3331090002 HH PPS REVENUE DEBIT

## 2021-10-24 VITALS
TEMPERATURE: 97.9 F | HEART RATE: 77 BPM | DIASTOLIC BLOOD PRESSURE: 62 MMHG | OXYGEN SATURATION: 96 % | SYSTOLIC BLOOD PRESSURE: 120 MMHG

## 2021-10-24 PROCEDURE — 3331090002 HH PPS REVENUE DEBIT

## 2021-10-24 PROCEDURE — 3331090001 HH PPS REVENUE CREDIT

## 2021-10-25 ENCOUNTER — HOME CARE VISIT (OUTPATIENT)
Dept: SCHEDULING | Facility: HOME HEALTH | Age: 86
End: 2021-10-25
Payer: MEDICARE

## 2021-10-25 ENCOUNTER — HOME CARE VISIT (OUTPATIENT)
Dept: HOME HEALTH SERVICES | Facility: HOME HEALTH | Age: 86
End: 2021-10-25
Payer: MEDICARE

## 2021-10-25 VITALS
OXYGEN SATURATION: 98 % | HEART RATE: 94 BPM | DIASTOLIC BLOOD PRESSURE: 70 MMHG | TEMPERATURE: 97.3 F | SYSTOLIC BLOOD PRESSURE: 130 MMHG | RESPIRATION RATE: 20 BRPM

## 2021-10-25 VITALS
TEMPERATURE: 96.8 F | SYSTOLIC BLOOD PRESSURE: 136 MMHG | HEART RATE: 80 BPM | OXYGEN SATURATION: 100 % | RESPIRATION RATE: 16 BRPM | DIASTOLIC BLOOD PRESSURE: 80 MMHG

## 2021-10-25 PROCEDURE — 3331090002 HH PPS REVENUE DEBIT

## 2021-10-25 PROCEDURE — 3331090001 HH PPS REVENUE CREDIT

## 2021-10-25 PROCEDURE — G0156 HHCP-SVS OF AIDE,EA 15 MIN: HCPCS

## 2021-10-25 PROCEDURE — G0299 HHS/HOSPICE OF RN EA 15 MIN: HCPCS

## 2021-10-26 ENCOUNTER — HOME CARE VISIT (OUTPATIENT)
Dept: SCHEDULING | Facility: HOME HEALTH | Age: 86
End: 2021-10-26
Payer: MEDICARE

## 2021-10-26 PROCEDURE — G0157 HHC PT ASSISTANT EA 15: HCPCS

## 2021-10-26 PROCEDURE — 3331090001 HH PPS REVENUE CREDIT

## 2021-10-26 PROCEDURE — G0152 HHCP-SERV OF OT,EA 15 MIN: HCPCS

## 2021-10-26 PROCEDURE — 3331090002 HH PPS REVENUE DEBIT

## 2021-10-27 ENCOUNTER — HOME CARE VISIT (OUTPATIENT)
Dept: SCHEDULING | Facility: HOME HEALTH | Age: 86
End: 2021-10-27
Payer: MEDICARE

## 2021-10-27 VITALS
TEMPERATURE: 97.8 F | HEART RATE: 70 BPM | DIASTOLIC BLOOD PRESSURE: 64 MMHG | OXYGEN SATURATION: 96 % | SYSTOLIC BLOOD PRESSURE: 132 MMHG

## 2021-10-27 VITALS
DIASTOLIC BLOOD PRESSURE: 64 MMHG | SYSTOLIC BLOOD PRESSURE: 132 MMHG | TEMPERATURE: 97.8 F | HEART RATE: 70 BPM | RESPIRATION RATE: 16 BRPM | OXYGEN SATURATION: 96 %

## 2021-10-27 PROCEDURE — G0152 HHCP-SERV OF OT,EA 15 MIN: HCPCS

## 2021-10-27 PROCEDURE — 3331090001 HH PPS REVENUE CREDIT

## 2021-10-27 PROCEDURE — 3331090002 HH PPS REVENUE DEBIT

## 2021-10-28 ENCOUNTER — HOME CARE VISIT (OUTPATIENT)
Dept: SCHEDULING | Facility: HOME HEALTH | Age: 86
End: 2021-10-28
Payer: MEDICARE

## 2021-10-28 VITALS
SYSTOLIC BLOOD PRESSURE: 112 MMHG | TEMPERATURE: 97.5 F | DIASTOLIC BLOOD PRESSURE: 74 MMHG | HEART RATE: 71 BPM | OXYGEN SATURATION: 98 %

## 2021-10-28 PROCEDURE — 3331090002 HH PPS REVENUE DEBIT

## 2021-10-28 PROCEDURE — G0156 HHCP-SVS OF AIDE,EA 15 MIN: HCPCS

## 2021-10-28 PROCEDURE — G0299 HHS/HOSPICE OF RN EA 15 MIN: HCPCS

## 2021-10-28 PROCEDURE — G0157 HHC PT ASSISTANT EA 15: HCPCS

## 2021-10-28 PROCEDURE — 3331090001 HH PPS REVENUE CREDIT

## 2021-10-29 PROCEDURE — 3331090001 HH PPS REVENUE CREDIT

## 2021-10-29 PROCEDURE — 3331090002 HH PPS REVENUE DEBIT

## 2021-10-30 VITALS
HEART RATE: 74 BPM | DIASTOLIC BLOOD PRESSURE: 70 MMHG | RESPIRATION RATE: 20 BRPM | SYSTOLIC BLOOD PRESSURE: 120 MMHG | OXYGEN SATURATION: 97 % | TEMPERATURE: 97.7 F

## 2021-10-30 PROCEDURE — 3331090001 HH PPS REVENUE CREDIT

## 2021-10-30 PROCEDURE — 3331090002 HH PPS REVENUE DEBIT

## 2021-10-31 VITALS
RESPIRATION RATE: 18 BRPM | DIASTOLIC BLOOD PRESSURE: 64 MMHG | SYSTOLIC BLOOD PRESSURE: 138 MMHG | OXYGEN SATURATION: 98 % | HEART RATE: 70 BPM | TEMPERATURE: 97.8 F

## 2021-10-31 PROCEDURE — 3331090001 HH PPS REVENUE CREDIT

## 2021-10-31 PROCEDURE — 3331090002 HH PPS REVENUE DEBIT

## 2021-11-01 ENCOUNTER — HOME CARE VISIT (OUTPATIENT)
Dept: SCHEDULING | Facility: HOME HEALTH | Age: 86
End: 2021-11-01
Payer: MEDICARE

## 2021-11-01 VITALS
TEMPERATURE: 98 F | RESPIRATION RATE: 20 BRPM | HEART RATE: 62 BPM | OXYGEN SATURATION: 97 % | DIASTOLIC BLOOD PRESSURE: 70 MMHG | SYSTOLIC BLOOD PRESSURE: 110 MMHG

## 2021-11-01 PROCEDURE — 3331090001 HH PPS REVENUE CREDIT

## 2021-11-01 PROCEDURE — G0299 HHS/HOSPICE OF RN EA 15 MIN: HCPCS

## 2021-11-01 PROCEDURE — 3331090002 HH PPS REVENUE DEBIT

## 2021-11-02 ENCOUNTER — HOME CARE VISIT (OUTPATIENT)
Dept: SCHEDULING | Facility: HOME HEALTH | Age: 86
End: 2021-11-02
Payer: MEDICARE

## 2021-11-02 VITALS
DIASTOLIC BLOOD PRESSURE: 64 MMHG | OXYGEN SATURATION: 98 % | RESPIRATION RATE: 18 BRPM | TEMPERATURE: 97 F | HEART RATE: 79 BPM | SYSTOLIC BLOOD PRESSURE: 122 MMHG

## 2021-11-02 PROCEDURE — G0156 HHCP-SVS OF AIDE,EA 15 MIN: HCPCS

## 2021-11-02 PROCEDURE — G0152 HHCP-SERV OF OT,EA 15 MIN: HCPCS

## 2021-11-02 PROCEDURE — 3331090001 HH PPS REVENUE CREDIT

## 2021-11-02 PROCEDURE — 3331090002 HH PPS REVENUE DEBIT

## 2021-11-03 ENCOUNTER — HOME CARE VISIT (OUTPATIENT)
Dept: HOME HEALTH SERVICES | Facility: HOME HEALTH | Age: 86
End: 2021-11-03
Payer: MEDICARE

## 2021-11-03 ENCOUNTER — HOME CARE VISIT (OUTPATIENT)
Dept: SCHEDULING | Facility: HOME HEALTH | Age: 86
End: 2021-11-03
Payer: MEDICARE

## 2021-11-03 PROCEDURE — 3331090002 HH PPS REVENUE DEBIT

## 2021-11-03 PROCEDURE — 3331090001 HH PPS REVENUE CREDIT

## 2021-11-03 PROCEDURE — G0157 HHC PT ASSISTANT EA 15: HCPCS

## 2021-11-04 ENCOUNTER — HOME CARE VISIT (OUTPATIENT)
Dept: SCHEDULING | Facility: HOME HEALTH | Age: 86
End: 2021-11-04
Payer: MEDICARE

## 2021-11-04 VITALS
HEART RATE: 74 BPM | TEMPERATURE: 97.1 F | DIASTOLIC BLOOD PRESSURE: 74 MMHG | SYSTOLIC BLOOD PRESSURE: 142 MMHG | OXYGEN SATURATION: 98 %

## 2021-11-04 VITALS
SYSTOLIC BLOOD PRESSURE: 140 MMHG | RESPIRATION RATE: 22 BRPM | OXYGEN SATURATION: 98 % | HEART RATE: 72 BPM | TEMPERATURE: 98.4 F | DIASTOLIC BLOOD PRESSURE: 80 MMHG

## 2021-11-04 PROCEDURE — 3331090001 HH PPS REVENUE CREDIT

## 2021-11-04 PROCEDURE — 3331090002 HH PPS REVENUE DEBIT

## 2021-11-04 PROCEDURE — G0299 HHS/HOSPICE OF RN EA 15 MIN: HCPCS

## 2021-11-05 ENCOUNTER — HOME CARE VISIT (OUTPATIENT)
Dept: SCHEDULING | Facility: HOME HEALTH | Age: 86
End: 2021-11-05
Payer: MEDICARE

## 2021-11-05 ENCOUNTER — HOME CARE VISIT (OUTPATIENT)
Dept: HOME HEALTH SERVICES | Facility: HOME HEALTH | Age: 86
End: 2021-11-05
Payer: MEDICARE

## 2021-11-05 PROCEDURE — G0151 HHCP-SERV OF PT,EA 15 MIN: HCPCS

## 2021-11-05 PROCEDURE — 3331090001 HH PPS REVENUE CREDIT

## 2021-11-05 PROCEDURE — G0156 HHCP-SVS OF AIDE,EA 15 MIN: HCPCS

## 2021-11-05 PROCEDURE — 3331090002 HH PPS REVENUE DEBIT

## 2021-11-06 PROCEDURE — 3331090001 HH PPS REVENUE CREDIT

## 2021-11-06 PROCEDURE — 3331090002 HH PPS REVENUE DEBIT

## 2021-11-07 PROCEDURE — 3331090001 HH PPS REVENUE CREDIT

## 2021-11-07 PROCEDURE — 3331090002 HH PPS REVENUE DEBIT

## 2021-11-07 PROCEDURE — 3331090003 HH PPS REVENUE ADJ

## 2021-11-08 ENCOUNTER — HOME CARE VISIT (OUTPATIENT)
Dept: SCHEDULING | Facility: HOME HEALTH | Age: 86
End: 2021-11-08
Payer: MEDICARE

## 2021-11-08 VITALS
RESPIRATION RATE: 20 BRPM | DIASTOLIC BLOOD PRESSURE: 78 MMHG | TEMPERATURE: 97.4 F | HEART RATE: 70 BPM | SYSTOLIC BLOOD PRESSURE: 140 MMHG | OXYGEN SATURATION: 97 %

## 2021-11-08 VITALS
DIASTOLIC BLOOD PRESSURE: 72 MMHG | TEMPERATURE: 98.5 F | RESPIRATION RATE: 20 BRPM | OXYGEN SATURATION: 98 % | SYSTOLIC BLOOD PRESSURE: 116 MMHG | HEART RATE: 88 BPM

## 2021-11-08 PROCEDURE — 3331090002 HH PPS REVENUE DEBIT

## 2021-11-08 PROCEDURE — 400018 HH-NO PAY CLAIM PROCEDURE

## 2021-11-08 PROCEDURE — 400013 HH SOC

## 2021-11-08 PROCEDURE — 3331090001 HH PPS REVENUE CREDIT

## 2021-11-08 PROCEDURE — G0299 HHS/HOSPICE OF RN EA 15 MIN: HCPCS

## 2021-11-09 ENCOUNTER — HOME CARE VISIT (OUTPATIENT)
Dept: SCHEDULING | Facility: HOME HEALTH | Age: 86
End: 2021-11-09
Payer: MEDICARE

## 2021-11-09 PROCEDURE — G0156 HHCP-SVS OF AIDE,EA 15 MIN: HCPCS

## 2021-11-09 PROCEDURE — G0157 HHC PT ASSISTANT EA 15: HCPCS

## 2021-11-09 PROCEDURE — G0152 HHCP-SERV OF OT,EA 15 MIN: HCPCS

## 2021-11-09 PROCEDURE — 3331090001 HH PPS REVENUE CREDIT

## 2021-11-09 PROCEDURE — 3331090002 HH PPS REVENUE DEBIT

## 2021-11-10 ENCOUNTER — HOME CARE VISIT (OUTPATIENT)
Dept: SCHEDULING | Facility: HOME HEALTH | Age: 86
End: 2021-11-10
Payer: MEDICARE

## 2021-11-10 VITALS
HEART RATE: 73 BPM | TEMPERATURE: 97.2 F | RESPIRATION RATE: 18 BRPM | SYSTOLIC BLOOD PRESSURE: 128 MMHG | OXYGEN SATURATION: 98 % | DIASTOLIC BLOOD PRESSURE: 62 MMHG

## 2021-11-10 VITALS
TEMPERATURE: 98 F | HEART RATE: 75 BPM | OXYGEN SATURATION: 97 % | SYSTOLIC BLOOD PRESSURE: 124 MMHG | DIASTOLIC BLOOD PRESSURE: 78 MMHG

## 2021-11-10 PROCEDURE — G0157 HHC PT ASSISTANT EA 15: HCPCS

## 2021-11-10 PROCEDURE — 3331090001 HH PPS REVENUE CREDIT

## 2021-11-10 PROCEDURE — 3331090002 HH PPS REVENUE DEBIT

## 2021-11-11 ENCOUNTER — HOME CARE VISIT (OUTPATIENT)
Dept: SCHEDULING | Facility: HOME HEALTH | Age: 86
End: 2021-11-11
Payer: MEDICARE

## 2021-11-11 ENCOUNTER — HOME CARE VISIT (OUTPATIENT)
Dept: HOME HEALTH SERVICES | Facility: HOME HEALTH | Age: 86
End: 2021-11-11
Payer: MEDICARE

## 2021-11-11 VITALS
SYSTOLIC BLOOD PRESSURE: 122 MMHG | RESPIRATION RATE: 16 BRPM | HEART RATE: 76 BPM | OXYGEN SATURATION: 99 % | DIASTOLIC BLOOD PRESSURE: 80 MMHG

## 2021-11-11 VITALS
SYSTOLIC BLOOD PRESSURE: 118 MMHG | DIASTOLIC BLOOD PRESSURE: 70 MMHG | TEMPERATURE: 98.3 F | OXYGEN SATURATION: 98 % | HEART RATE: 60 BPM | RESPIRATION RATE: 20 BRPM

## 2021-11-11 VITALS
SYSTOLIC BLOOD PRESSURE: 110 MMHG | HEART RATE: 75 BPM | DIASTOLIC BLOOD PRESSURE: 66 MMHG | TEMPERATURE: 98 F | OXYGEN SATURATION: 98 %

## 2021-11-11 PROCEDURE — G0152 HHCP-SERV OF OT,EA 15 MIN: HCPCS

## 2021-11-11 PROCEDURE — G0156 HHCP-SVS OF AIDE,EA 15 MIN: HCPCS

## 2021-11-11 PROCEDURE — 3331090001 HH PPS REVENUE CREDIT

## 2021-11-11 PROCEDURE — G0299 HHS/HOSPICE OF RN EA 15 MIN: HCPCS

## 2021-11-11 PROCEDURE — 3331090002 HH PPS REVENUE DEBIT

## 2021-11-12 PROCEDURE — 3331090002 HH PPS REVENUE DEBIT

## 2021-11-12 PROCEDURE — 3331090001 HH PPS REVENUE CREDIT

## 2021-11-13 PROCEDURE — 3331090001 HH PPS REVENUE CREDIT

## 2021-11-13 PROCEDURE — 3331090002 HH PPS REVENUE DEBIT

## 2021-11-14 PROCEDURE — 3331090001 HH PPS REVENUE CREDIT

## 2021-11-14 PROCEDURE — 3331090002 HH PPS REVENUE DEBIT

## 2021-11-15 ENCOUNTER — HOME CARE VISIT (OUTPATIENT)
Dept: SCHEDULING | Facility: HOME HEALTH | Age: 86
End: 2021-11-15
Payer: MEDICARE

## 2021-11-15 VITALS
WEIGHT: 179 LBS | HEART RATE: 75 BPM | OXYGEN SATURATION: 94 % | BODY MASS INDEX: 27.22 KG/M2 | TEMPERATURE: 98.1 F | SYSTOLIC BLOOD PRESSURE: 140 MMHG | DIASTOLIC BLOOD PRESSURE: 60 MMHG

## 2021-11-15 PROCEDURE — 3331090001 HH PPS REVENUE CREDIT

## 2021-11-15 PROCEDURE — 3331090002 HH PPS REVENUE DEBIT

## 2021-11-15 PROCEDURE — G0157 HHC PT ASSISTANT EA 15: HCPCS

## 2021-11-16 ENCOUNTER — HOME CARE VISIT (OUTPATIENT)
Dept: SCHEDULING | Facility: HOME HEALTH | Age: 86
End: 2021-11-16
Payer: MEDICARE

## 2021-11-16 VITALS
HEART RATE: 78 BPM | SYSTOLIC BLOOD PRESSURE: 140 MMHG | TEMPERATURE: 97.6 F | RESPIRATION RATE: 20 BRPM | DIASTOLIC BLOOD PRESSURE: 80 MMHG | OXYGEN SATURATION: 99 %

## 2021-11-16 PROCEDURE — G0156 HHCP-SVS OF AIDE,EA 15 MIN: HCPCS

## 2021-11-16 PROCEDURE — G0152 HHCP-SERV OF OT,EA 15 MIN: HCPCS

## 2021-11-16 PROCEDURE — 3331090001 HH PPS REVENUE CREDIT

## 2021-11-16 PROCEDURE — G0299 HHS/HOSPICE OF RN EA 15 MIN: HCPCS

## 2021-11-16 PROCEDURE — 3331090002 HH PPS REVENUE DEBIT

## 2021-11-17 PROCEDURE — 3331090002 HH PPS REVENUE DEBIT

## 2021-11-17 PROCEDURE — 3331090001 HH PPS REVENUE CREDIT

## 2021-11-18 ENCOUNTER — HOME CARE VISIT (OUTPATIENT)
Dept: SCHEDULING | Facility: HOME HEALTH | Age: 86
End: 2021-11-18
Payer: MEDICARE

## 2021-11-18 PROCEDURE — 3331090002 HH PPS REVENUE DEBIT

## 2021-11-18 PROCEDURE — G0157 HHC PT ASSISTANT EA 15: HCPCS

## 2021-11-18 PROCEDURE — G0156 HHCP-SVS OF AIDE,EA 15 MIN: HCPCS

## 2021-11-18 PROCEDURE — 3331090001 HH PPS REVENUE CREDIT

## 2021-11-19 ENCOUNTER — HOME CARE VISIT (OUTPATIENT)
Dept: SCHEDULING | Facility: HOME HEALTH | Age: 86
End: 2021-11-19
Payer: MEDICARE

## 2021-11-19 VITALS
TEMPERATURE: 97.8 F | HEART RATE: 75 BPM | OXYGEN SATURATION: 94 % | SYSTOLIC BLOOD PRESSURE: 118 MMHG | DIASTOLIC BLOOD PRESSURE: 62 MMHG

## 2021-11-19 VITALS
OXYGEN SATURATION: 98 % | SYSTOLIC BLOOD PRESSURE: 120 MMHG | DIASTOLIC BLOOD PRESSURE: 76 MMHG | HEART RATE: 65 BPM | TEMPERATURE: 98 F | RESPIRATION RATE: 17 BRPM

## 2021-11-19 VITALS
SYSTOLIC BLOOD PRESSURE: 132 MMHG | RESPIRATION RATE: 18 BRPM | OXYGEN SATURATION: 97 % | DIASTOLIC BLOOD PRESSURE: 62 MMHG | HEART RATE: 75 BPM | TEMPERATURE: 97.2 F

## 2021-11-19 PROCEDURE — 3331090002 HH PPS REVENUE DEBIT

## 2021-11-19 PROCEDURE — G0152 HHCP-SERV OF OT,EA 15 MIN: HCPCS

## 2021-11-19 PROCEDURE — G0299 HHS/HOSPICE OF RN EA 15 MIN: HCPCS

## 2021-11-19 PROCEDURE — 3331090001 HH PPS REVENUE CREDIT

## 2021-11-20 VITALS
TEMPERATURE: 97.7 F | HEART RATE: 72 BPM | OXYGEN SATURATION: 98 % | SYSTOLIC BLOOD PRESSURE: 130 MMHG | RESPIRATION RATE: 20 BRPM | DIASTOLIC BLOOD PRESSURE: 66 MMHG

## 2021-11-20 PROCEDURE — 3331090001 HH PPS REVENUE CREDIT

## 2021-11-20 PROCEDURE — 3331090002 HH PPS REVENUE DEBIT

## 2021-11-21 PROCEDURE — 3331090002 HH PPS REVENUE DEBIT

## 2021-11-21 PROCEDURE — 3331090001 HH PPS REVENUE CREDIT

## 2021-11-22 ENCOUNTER — HOME CARE VISIT (OUTPATIENT)
Dept: SCHEDULING | Facility: HOME HEALTH | Age: 86
End: 2021-11-22
Payer: MEDICARE

## 2021-11-22 PROCEDURE — G0299 HHS/HOSPICE OF RN EA 15 MIN: HCPCS

## 2021-11-22 PROCEDURE — G0152 HHCP-SERV OF OT,EA 15 MIN: HCPCS

## 2021-11-22 PROCEDURE — 3331090002 HH PPS REVENUE DEBIT

## 2021-11-22 PROCEDURE — 3331090001 HH PPS REVENUE CREDIT

## 2021-11-23 ENCOUNTER — HOME CARE VISIT (OUTPATIENT)
Dept: SCHEDULING | Facility: HOME HEALTH | Age: 86
End: 2021-11-23
Payer: MEDICARE

## 2021-11-23 VITALS
SYSTOLIC BLOOD PRESSURE: 120 MMHG | DIASTOLIC BLOOD PRESSURE: 72 MMHG | OXYGEN SATURATION: 94 % | HEART RATE: 68 BPM | TEMPERATURE: 97.9 F

## 2021-11-23 VITALS
RESPIRATION RATE: 18 BRPM | OXYGEN SATURATION: 98 % | SYSTOLIC BLOOD PRESSURE: 118 MMHG | TEMPERATURE: 97.4 F | DIASTOLIC BLOOD PRESSURE: 60 MMHG | HEART RATE: 82 BPM

## 2021-11-23 VITALS
TEMPERATURE: 98.2 F | DIASTOLIC BLOOD PRESSURE: 70 MMHG | OXYGEN SATURATION: 98 % | RESPIRATION RATE: 20 BRPM | HEART RATE: 71 BPM | SYSTOLIC BLOOD PRESSURE: 126 MMHG

## 2021-11-23 PROCEDURE — G0156 HHCP-SVS OF AIDE,EA 15 MIN: HCPCS

## 2021-11-23 PROCEDURE — 3331090002 HH PPS REVENUE DEBIT

## 2021-11-23 PROCEDURE — 3331090001 HH PPS REVENUE CREDIT

## 2021-11-23 PROCEDURE — G0157 HHC PT ASSISTANT EA 15: HCPCS

## 2021-11-24 ENCOUNTER — HOME CARE VISIT (OUTPATIENT)
Dept: SCHEDULING | Facility: HOME HEALTH | Age: 86
End: 2021-11-24
Payer: MEDICARE

## 2021-11-24 ENCOUNTER — HOME CARE VISIT (OUTPATIENT)
Dept: HOME HEALTH SERVICES | Facility: HOME HEALTH | Age: 86
End: 2021-11-24
Payer: MEDICARE

## 2021-11-24 VITALS
SYSTOLIC BLOOD PRESSURE: 122 MMHG | TEMPERATURE: 98.2 F | DIASTOLIC BLOOD PRESSURE: 68 MMHG | OXYGEN SATURATION: 97 % | HEART RATE: 70 BPM

## 2021-11-24 PROCEDURE — 3331090001 HH PPS REVENUE CREDIT

## 2021-11-24 PROCEDURE — A6212 FOAM DRG <=16 SQ IN W/BORDER: HCPCS

## 2021-11-24 PROCEDURE — 3331090002 HH PPS REVENUE DEBIT

## 2021-11-24 PROCEDURE — G0152 HHCP-SERV OF OT,EA 15 MIN: HCPCS

## 2021-11-25 PROCEDURE — 3331090001 HH PPS REVENUE CREDIT

## 2021-11-25 PROCEDURE — 3331090002 HH PPS REVENUE DEBIT

## 2021-11-26 ENCOUNTER — HOME CARE VISIT (OUTPATIENT)
Dept: SCHEDULING | Facility: HOME HEALTH | Age: 86
End: 2021-11-26
Payer: MEDICARE

## 2021-11-26 PROCEDURE — G0157 HHC PT ASSISTANT EA 15: HCPCS

## 2021-11-26 PROCEDURE — 3331090002 HH PPS REVENUE DEBIT

## 2021-11-26 PROCEDURE — G0299 HHS/HOSPICE OF RN EA 15 MIN: HCPCS

## 2021-11-26 PROCEDURE — G0156 HHCP-SVS OF AIDE,EA 15 MIN: HCPCS

## 2021-11-26 PROCEDURE — 3331090001 HH PPS REVENUE CREDIT

## 2021-11-27 PROCEDURE — 3331090001 HH PPS REVENUE CREDIT

## 2021-11-27 PROCEDURE — 3331090002 HH PPS REVENUE DEBIT

## 2021-11-28 VITALS
SYSTOLIC BLOOD PRESSURE: 130 MMHG | TEMPERATURE: 98.3 F | RESPIRATION RATE: 20 BRPM | HEART RATE: 73 BPM | OXYGEN SATURATION: 98 % | DIASTOLIC BLOOD PRESSURE: 70 MMHG

## 2021-11-28 PROCEDURE — 3331090002 HH PPS REVENUE DEBIT

## 2021-11-28 PROCEDURE — 3331090001 HH PPS REVENUE CREDIT

## 2021-11-29 ENCOUNTER — HOME CARE VISIT (OUTPATIENT)
Dept: SCHEDULING | Facility: HOME HEALTH | Age: 86
End: 2021-11-29
Payer: MEDICARE

## 2021-11-29 ENCOUNTER — HOME CARE VISIT (OUTPATIENT)
Dept: HOME HEALTH SERVICES | Facility: HOME HEALTH | Age: 86
End: 2021-11-29
Payer: MEDICARE

## 2021-11-29 VITALS
DIASTOLIC BLOOD PRESSURE: 70 MMHG | SYSTOLIC BLOOD PRESSURE: 116 MMHG | OXYGEN SATURATION: 96 % | HEART RATE: 79 BPM | TEMPERATURE: 97.8 F

## 2021-11-29 VITALS
DIASTOLIC BLOOD PRESSURE: 80 MMHG | SYSTOLIC BLOOD PRESSURE: 140 MMHG | RESPIRATION RATE: 20 BRPM | TEMPERATURE: 98.1 F | OXYGEN SATURATION: 96 % | HEART RATE: 75 BPM

## 2021-11-29 PROCEDURE — 3331090002 HH PPS REVENUE DEBIT

## 2021-11-29 PROCEDURE — G0299 HHS/HOSPICE OF RN EA 15 MIN: HCPCS

## 2021-11-29 PROCEDURE — 3331090001 HH PPS REVENUE CREDIT

## 2021-11-29 PROCEDURE — G0151 HHCP-SERV OF PT,EA 15 MIN: HCPCS

## 2021-11-30 ENCOUNTER — HOME CARE VISIT (OUTPATIENT)
Dept: HOME HEALTH SERVICES | Facility: HOME HEALTH | Age: 86
End: 2021-11-30
Payer: MEDICARE

## 2021-11-30 VITALS
DIASTOLIC BLOOD PRESSURE: 70 MMHG | SYSTOLIC BLOOD PRESSURE: 124 MMHG | RESPIRATION RATE: 18 BRPM | OXYGEN SATURATION: 97 % | TEMPERATURE: 97.6 F | HEART RATE: 58 BPM

## 2021-11-30 PROCEDURE — 3331090002 HH PPS REVENUE DEBIT

## 2021-11-30 PROCEDURE — 3331090001 HH PPS REVENUE CREDIT

## 2021-12-01 ENCOUNTER — HOME CARE VISIT (OUTPATIENT)
Dept: HOME HEALTH SERVICES | Facility: HOME HEALTH | Age: 86
End: 2021-12-01
Payer: MEDICARE

## 2021-12-01 PROCEDURE — 3331090002 HH PPS REVENUE DEBIT

## 2021-12-01 PROCEDURE — 3331090001 HH PPS REVENUE CREDIT

## 2021-12-02 ENCOUNTER — HOME CARE VISIT (OUTPATIENT)
Dept: SCHEDULING | Facility: HOME HEALTH | Age: 86
End: 2021-12-02
Payer: MEDICARE

## 2021-12-02 VITALS
SYSTOLIC BLOOD PRESSURE: 140 MMHG | HEART RATE: 60 BPM | OXYGEN SATURATION: 92 % | DIASTOLIC BLOOD PRESSURE: 80 MMHG | RESPIRATION RATE: 22 BRPM | TEMPERATURE: 98 F

## 2021-12-02 PROCEDURE — G0151 HHCP-SERV OF PT,EA 15 MIN: HCPCS

## 2021-12-02 PROCEDURE — G0299 HHS/HOSPICE OF RN EA 15 MIN: HCPCS

## 2021-12-02 PROCEDURE — 3331090002 HH PPS REVENUE DEBIT

## 2021-12-02 PROCEDURE — 3331090001 HH PPS REVENUE CREDIT

## 2021-12-02 PROCEDURE — G0156 HHCP-SVS OF AIDE,EA 15 MIN: HCPCS

## 2021-12-03 PROCEDURE — 3331090001 HH PPS REVENUE CREDIT

## 2021-12-03 PROCEDURE — 3331090002 HH PPS REVENUE DEBIT

## 2021-12-04 VITALS
TEMPERATURE: 97.6 F | HEART RATE: 78 BPM | DIASTOLIC BLOOD PRESSURE: 70 MMHG | RESPIRATION RATE: 18 BRPM | OXYGEN SATURATION: 95 % | SYSTOLIC BLOOD PRESSURE: 138 MMHG

## 2021-12-04 PROCEDURE — 3331090002 HH PPS REVENUE DEBIT

## 2021-12-04 PROCEDURE — 3331090001 HH PPS REVENUE CREDIT

## 2021-12-05 PROCEDURE — 3331090002 HH PPS REVENUE DEBIT

## 2021-12-05 PROCEDURE — 3331090001 HH PPS REVENUE CREDIT

## 2021-12-06 ENCOUNTER — HOME CARE VISIT (OUTPATIENT)
Dept: SCHEDULING | Facility: HOME HEALTH | Age: 86
End: 2021-12-06
Payer: MEDICARE

## 2021-12-06 PROCEDURE — 3331090001 HH PPS REVENUE CREDIT

## 2021-12-06 PROCEDURE — G0299 HHS/HOSPICE OF RN EA 15 MIN: HCPCS

## 2021-12-06 PROCEDURE — 3331090002 HH PPS REVENUE DEBIT

## 2021-12-07 ENCOUNTER — HOME CARE VISIT (OUTPATIENT)
Dept: SCHEDULING | Facility: HOME HEALTH | Age: 86
End: 2021-12-07
Payer: MEDICARE

## 2021-12-07 VITALS
HEART RATE: 73 BPM | DIASTOLIC BLOOD PRESSURE: 80 MMHG | SYSTOLIC BLOOD PRESSURE: 142 MMHG | RESPIRATION RATE: 20 BRPM | TEMPERATURE: 97.8 F | OXYGEN SATURATION: 96 %

## 2021-12-07 PROCEDURE — 3331090001 HH PPS REVENUE CREDIT

## 2021-12-07 PROCEDURE — 3331090003 HH PPS REVENUE ADJ

## 2021-12-07 PROCEDURE — 3331090002 HH PPS REVENUE DEBIT

## 2021-12-07 PROCEDURE — G0156 HHCP-SVS OF AIDE,EA 15 MIN: HCPCS

## 2021-12-08 PROCEDURE — 3331090001 HH PPS REVENUE CREDIT

## 2021-12-08 PROCEDURE — 400018 HH-NO PAY CLAIM PROCEDURE

## 2021-12-08 PROCEDURE — 3331090002 HH PPS REVENUE DEBIT

## 2021-12-09 ENCOUNTER — HOME CARE VISIT (OUTPATIENT)
Dept: HOME HEALTH SERVICES | Facility: HOME HEALTH | Age: 86
End: 2021-12-09
Payer: MEDICARE

## 2021-12-09 ENCOUNTER — HOME CARE VISIT (OUTPATIENT)
Dept: SCHEDULING | Facility: HOME HEALTH | Age: 86
End: 2021-12-09
Payer: MEDICARE

## 2021-12-09 PROCEDURE — G0156 HHCP-SVS OF AIDE,EA 15 MIN: HCPCS

## 2021-12-09 PROCEDURE — 400014 HH F/U

## 2021-12-09 PROCEDURE — 3331090002 HH PPS REVENUE DEBIT

## 2021-12-09 PROCEDURE — 3331090001 HH PPS REVENUE CREDIT

## 2021-12-09 PROCEDURE — G0299 HHS/HOSPICE OF RN EA 15 MIN: HCPCS

## 2021-12-10 PROCEDURE — 3331090001 HH PPS REVENUE CREDIT

## 2021-12-10 PROCEDURE — 3331090002 HH PPS REVENUE DEBIT

## 2021-12-11 VITALS
DIASTOLIC BLOOD PRESSURE: 70 MMHG | HEART RATE: 85 BPM | RESPIRATION RATE: 20 BRPM | TEMPERATURE: 98.4 F | OXYGEN SATURATION: 97 % | SYSTOLIC BLOOD PRESSURE: 118 MMHG

## 2021-12-11 PROCEDURE — 3331090001 HH PPS REVENUE CREDIT

## 2021-12-11 PROCEDURE — 3331090002 HH PPS REVENUE DEBIT

## 2021-12-12 PROCEDURE — 3331090002 HH PPS REVENUE DEBIT

## 2021-12-12 PROCEDURE — 3331090001 HH PPS REVENUE CREDIT

## 2021-12-13 PROCEDURE — 3331090002 HH PPS REVENUE DEBIT

## 2021-12-13 PROCEDURE — 3331090001 HH PPS REVENUE CREDIT

## 2021-12-14 ENCOUNTER — HOME CARE VISIT (OUTPATIENT)
Dept: SCHEDULING | Facility: HOME HEALTH | Age: 86
End: 2021-12-14
Payer: MEDICARE

## 2021-12-14 PROCEDURE — G0299 HHS/HOSPICE OF RN EA 15 MIN: HCPCS

## 2021-12-14 PROCEDURE — G0156 HHCP-SVS OF AIDE,EA 15 MIN: HCPCS

## 2021-12-14 PROCEDURE — 3331090001 HH PPS REVENUE CREDIT

## 2021-12-14 PROCEDURE — 3331090002 HH PPS REVENUE DEBIT

## 2021-12-15 VITALS
RESPIRATION RATE: 20 BRPM | HEART RATE: 68 BPM | TEMPERATURE: 98.2 F | OXYGEN SATURATION: 94 % | DIASTOLIC BLOOD PRESSURE: 80 MMHG | SYSTOLIC BLOOD PRESSURE: 130 MMHG

## 2021-12-15 PROCEDURE — 3331090002 HH PPS REVENUE DEBIT

## 2021-12-15 PROCEDURE — 3331090001 HH PPS REVENUE CREDIT

## 2021-12-16 ENCOUNTER — HOME CARE VISIT (OUTPATIENT)
Dept: SCHEDULING | Facility: HOME HEALTH | Age: 86
End: 2021-12-16
Payer: MEDICARE

## 2021-12-16 PROCEDURE — 3331090002 HH PPS REVENUE DEBIT

## 2021-12-16 PROCEDURE — G0156 HHCP-SVS OF AIDE,EA 15 MIN: HCPCS

## 2021-12-16 PROCEDURE — 3331090001 HH PPS REVENUE CREDIT

## 2021-12-17 ENCOUNTER — HOME CARE VISIT (OUTPATIENT)
Dept: SCHEDULING | Facility: HOME HEALTH | Age: 86
End: 2021-12-17
Payer: MEDICARE

## 2021-12-17 PROCEDURE — 3331090001 HH PPS REVENUE CREDIT

## 2021-12-17 PROCEDURE — 3331090002 HH PPS REVENUE DEBIT

## 2021-12-17 PROCEDURE — G0299 HHS/HOSPICE OF RN EA 15 MIN: HCPCS

## 2021-12-18 PROCEDURE — 3331090002 HH PPS REVENUE DEBIT

## 2021-12-18 PROCEDURE — 3331090001 HH PPS REVENUE CREDIT

## 2021-12-19 VITALS
HEART RATE: 64 BPM | OXYGEN SATURATION: 95 % | RESPIRATION RATE: 20 BRPM | SYSTOLIC BLOOD PRESSURE: 120 MMHG | DIASTOLIC BLOOD PRESSURE: 80 MMHG | TEMPERATURE: 98.5 F

## 2021-12-19 PROCEDURE — 3331090002 HH PPS REVENUE DEBIT

## 2021-12-19 PROCEDURE — 3331090001 HH PPS REVENUE CREDIT

## 2021-12-20 ENCOUNTER — HOME CARE VISIT (OUTPATIENT)
Dept: SCHEDULING | Facility: HOME HEALTH | Age: 86
End: 2021-12-20
Payer: MEDICARE

## 2021-12-20 PROCEDURE — 3331090002 HH PPS REVENUE DEBIT

## 2021-12-20 PROCEDURE — G0156 HHCP-SVS OF AIDE,EA 15 MIN: HCPCS

## 2021-12-20 PROCEDURE — 3331090001 HH PPS REVENUE CREDIT

## 2021-12-20 PROCEDURE — G0299 HHS/HOSPICE OF RN EA 15 MIN: HCPCS

## 2021-12-21 VITALS
TEMPERATURE: 98.6 F | RESPIRATION RATE: 20 BRPM | SYSTOLIC BLOOD PRESSURE: 110 MMHG | HEART RATE: 60 BPM | DIASTOLIC BLOOD PRESSURE: 70 MMHG | OXYGEN SATURATION: 98 %

## 2021-12-21 PROCEDURE — 3331090001 HH PPS REVENUE CREDIT

## 2021-12-21 PROCEDURE — 3331090002 HH PPS REVENUE DEBIT

## 2021-12-22 PROCEDURE — 3331090002 HH PPS REVENUE DEBIT

## 2021-12-22 PROCEDURE — 3331090001 HH PPS REVENUE CREDIT

## 2021-12-23 ENCOUNTER — HOME CARE VISIT (OUTPATIENT)
Dept: SCHEDULING | Facility: HOME HEALTH | Age: 86
End: 2021-12-23
Payer: MEDICARE

## 2021-12-23 VITALS
RESPIRATION RATE: 20 BRPM | HEART RATE: 77 BPM | SYSTOLIC BLOOD PRESSURE: 120 MMHG | TEMPERATURE: 98.4 F | OXYGEN SATURATION: 97 % | DIASTOLIC BLOOD PRESSURE: 78 MMHG

## 2021-12-23 PROCEDURE — 3331090002 HH PPS REVENUE DEBIT

## 2021-12-23 PROCEDURE — G0156 HHCP-SVS OF AIDE,EA 15 MIN: HCPCS

## 2021-12-23 PROCEDURE — 3331090001 HH PPS REVENUE CREDIT

## 2021-12-23 PROCEDURE — G0299 HHS/HOSPICE OF RN EA 15 MIN: HCPCS

## 2021-12-24 PROCEDURE — 3331090002 HH PPS REVENUE DEBIT

## 2021-12-24 PROCEDURE — 3331090001 HH PPS REVENUE CREDIT

## 2021-12-25 PROCEDURE — 3331090001 HH PPS REVENUE CREDIT

## 2021-12-25 PROCEDURE — 3331090002 HH PPS REVENUE DEBIT

## 2021-12-26 PROCEDURE — 3331090002 HH PPS REVENUE DEBIT

## 2021-12-26 PROCEDURE — 3331090001 HH PPS REVENUE CREDIT

## 2021-12-27 PROCEDURE — 3331090001 HH PPS REVENUE CREDIT

## 2021-12-27 PROCEDURE — 3331090002 HH PPS REVENUE DEBIT

## 2021-12-28 ENCOUNTER — HOME CARE VISIT (OUTPATIENT)
Dept: SCHEDULING | Facility: HOME HEALTH | Age: 86
End: 2021-12-28
Payer: MEDICARE

## 2021-12-28 PROCEDURE — 3331090002 HH PPS REVENUE DEBIT

## 2021-12-28 PROCEDURE — G0299 HHS/HOSPICE OF RN EA 15 MIN: HCPCS

## 2021-12-28 PROCEDURE — G0156 HHCP-SVS OF AIDE,EA 15 MIN: HCPCS

## 2021-12-28 PROCEDURE — 3331090001 HH PPS REVENUE CREDIT

## 2021-12-29 VITALS
RESPIRATION RATE: 20 BRPM | DIASTOLIC BLOOD PRESSURE: 80 MMHG | HEART RATE: 67 BPM | TEMPERATURE: 98.2 F | SYSTOLIC BLOOD PRESSURE: 130 MMHG | OXYGEN SATURATION: 95 %

## 2021-12-29 PROCEDURE — 3331090002 HH PPS REVENUE DEBIT

## 2021-12-29 PROCEDURE — 3331090001 HH PPS REVENUE CREDIT

## 2021-12-30 ENCOUNTER — HOME CARE VISIT (OUTPATIENT)
Dept: SCHEDULING | Facility: HOME HEALTH | Age: 86
End: 2021-12-30
Payer: MEDICARE

## 2021-12-30 PROCEDURE — 3331090001 HH PPS REVENUE CREDIT

## 2021-12-30 PROCEDURE — G0156 HHCP-SVS OF AIDE,EA 15 MIN: HCPCS

## 2021-12-30 PROCEDURE — G0299 HHS/HOSPICE OF RN EA 15 MIN: HCPCS

## 2021-12-30 PROCEDURE — 3331090002 HH PPS REVENUE DEBIT

## 2021-12-31 VITALS
DIASTOLIC BLOOD PRESSURE: 80 MMHG | RESPIRATION RATE: 20 BRPM | HEART RATE: 73 BPM | OXYGEN SATURATION: 96 % | SYSTOLIC BLOOD PRESSURE: 126 MMHG | TEMPERATURE: 98.2 F

## 2021-12-31 PROCEDURE — 3331090002 HH PPS REVENUE DEBIT

## 2021-12-31 PROCEDURE — 3331090001 HH PPS REVENUE CREDIT

## 2022-01-01 PROCEDURE — 3331090002 HH PPS REVENUE DEBIT

## 2022-01-01 PROCEDURE — 3331090001 HH PPS REVENUE CREDIT

## 2022-01-02 PROCEDURE — 3331090002 HH PPS REVENUE DEBIT

## 2022-01-02 PROCEDURE — 3331090001 HH PPS REVENUE CREDIT

## 2022-01-03 PROCEDURE — 3331090002 HH PPS REVENUE DEBIT

## 2022-01-03 PROCEDURE — 3331090001 HH PPS REVENUE CREDIT

## 2022-01-04 ENCOUNTER — HOME CARE VISIT (OUTPATIENT)
Dept: SCHEDULING | Facility: HOME HEALTH | Age: 87
End: 2022-01-04
Payer: MEDICARE

## 2022-01-04 ENCOUNTER — HOME CARE VISIT (OUTPATIENT)
Dept: HOME HEALTH SERVICES | Facility: HOME HEALTH | Age: 87
End: 2022-01-04
Payer: MEDICARE

## 2022-01-04 PROCEDURE — 3331090001 HH PPS REVENUE CREDIT

## 2022-01-04 PROCEDURE — G0299 HHS/HOSPICE OF RN EA 15 MIN: HCPCS

## 2022-01-04 PROCEDURE — 3331090002 HH PPS REVENUE DEBIT

## 2022-01-05 PROCEDURE — 3331090001 HH PPS REVENUE CREDIT

## 2022-01-05 PROCEDURE — 3331090002 HH PPS REVENUE DEBIT

## 2022-01-06 ENCOUNTER — HOME CARE VISIT (OUTPATIENT)
Dept: SCHEDULING | Facility: HOME HEALTH | Age: 87
End: 2022-01-06
Payer: MEDICARE

## 2022-01-06 VITALS
DIASTOLIC BLOOD PRESSURE: 82 MMHG | HEART RATE: 75 BPM | OXYGEN SATURATION: 95 % | SYSTOLIC BLOOD PRESSURE: 120 MMHG | TEMPERATURE: 98.1 F

## 2022-01-06 VITALS
HEART RATE: 64 BPM | DIASTOLIC BLOOD PRESSURE: 80 MMHG | OXYGEN SATURATION: 97 % | RESPIRATION RATE: 20 BRPM | TEMPERATURE: 98.1 F | SYSTOLIC BLOOD PRESSURE: 120 MMHG

## 2022-01-06 PROCEDURE — G0299 HHS/HOSPICE OF RN EA 15 MIN: HCPCS

## 2022-01-06 PROCEDURE — 3331090002 HH PPS REVENUE DEBIT

## 2022-01-06 PROCEDURE — 3331090003 HH PPS REVENUE ADJ

## 2022-01-06 PROCEDURE — 3331090001 HH PPS REVENUE CREDIT

## 2022-01-07 ENCOUNTER — HOME CARE VISIT (OUTPATIENT)
Dept: HOME HEALTH SERVICES | Facility: HOME HEALTH | Age: 87
End: 2022-01-07
Payer: MEDICARE

## 2022-01-07 PROCEDURE — 3331090002 HH PPS REVENUE DEBIT

## 2022-01-07 PROCEDURE — 3331090001 HH PPS REVENUE CREDIT

## 2022-01-07 PROCEDURE — 400018 HH-NO PAY CLAIM PROCEDURE

## 2022-01-08 PROCEDURE — 3331090002 HH PPS REVENUE DEBIT

## 2022-01-08 PROCEDURE — 3331090001 HH PPS REVENUE CREDIT

## 2022-01-09 PROCEDURE — 3331090002 HH PPS REVENUE DEBIT

## 2022-01-09 PROCEDURE — 3331090001 HH PPS REVENUE CREDIT

## 2022-01-10 ENCOUNTER — HOME CARE VISIT (OUTPATIENT)
Dept: SCHEDULING | Facility: HOME HEALTH | Age: 87
End: 2022-01-10
Payer: MEDICARE

## 2022-01-10 PROCEDURE — 3331090001 HH PPS REVENUE CREDIT

## 2022-01-10 PROCEDURE — G0299 HHS/HOSPICE OF RN EA 15 MIN: HCPCS

## 2022-01-10 PROCEDURE — 3331090002 HH PPS REVENUE DEBIT

## 2022-01-10 PROCEDURE — 400014 HH F/U

## 2022-01-11 ENCOUNTER — HOME CARE VISIT (OUTPATIENT)
Dept: HOME HEALTH SERVICES | Facility: HOME HEALTH | Age: 87
End: 2022-01-11
Payer: MEDICARE

## 2022-01-11 VITALS
SYSTOLIC BLOOD PRESSURE: 120 MMHG | RESPIRATION RATE: 20 BRPM | TEMPERATURE: 98.5 F | HEART RATE: 69 BPM | DIASTOLIC BLOOD PRESSURE: 78 MMHG | OXYGEN SATURATION: 98 %

## 2022-01-11 PROCEDURE — 3331090001 HH PPS REVENUE CREDIT

## 2022-01-11 PROCEDURE — 3331090002 HH PPS REVENUE DEBIT

## 2022-01-12 PROCEDURE — 3331090002 HH PPS REVENUE DEBIT

## 2022-01-12 PROCEDURE — 3331090001 HH PPS REVENUE CREDIT

## 2022-01-13 ENCOUNTER — HOME CARE VISIT (OUTPATIENT)
Dept: HOME HEALTH SERVICES | Facility: HOME HEALTH | Age: 87
End: 2022-01-13
Payer: MEDICARE

## 2022-01-13 ENCOUNTER — HOME CARE VISIT (OUTPATIENT)
Dept: SCHEDULING | Facility: HOME HEALTH | Age: 87
End: 2022-01-13
Payer: MEDICARE

## 2022-01-13 PROCEDURE — 3331090002 HH PPS REVENUE DEBIT

## 2022-01-13 PROCEDURE — 3331090001 HH PPS REVENUE CREDIT

## 2022-01-13 PROCEDURE — G0299 HHS/HOSPICE OF RN EA 15 MIN: HCPCS

## 2022-01-14 VITALS
HEART RATE: 91 BPM | OXYGEN SATURATION: 95 % | SYSTOLIC BLOOD PRESSURE: 146 MMHG | TEMPERATURE: 98.3 F | RESPIRATION RATE: 20 BRPM | DIASTOLIC BLOOD PRESSURE: 86 MMHG

## 2022-01-14 PROCEDURE — 3331090001 HH PPS REVENUE CREDIT

## 2022-01-14 PROCEDURE — 3331090002 HH PPS REVENUE DEBIT

## 2022-01-15 PROCEDURE — 3331090002 HH PPS REVENUE DEBIT

## 2022-01-15 PROCEDURE — 3331090001 HH PPS REVENUE CREDIT

## 2022-01-16 PROCEDURE — 3331090002 HH PPS REVENUE DEBIT

## 2022-01-16 PROCEDURE — 3331090001 HH PPS REVENUE CREDIT

## 2022-01-17 PROCEDURE — 3331090001 HH PPS REVENUE CREDIT

## 2022-01-17 PROCEDURE — 3331090002 HH PPS REVENUE DEBIT

## 2022-01-18 ENCOUNTER — HOME CARE VISIT (OUTPATIENT)
Dept: HOME HEALTH SERVICES | Facility: HOME HEALTH | Age: 87
End: 2022-01-18
Payer: MEDICARE

## 2022-01-18 ENCOUNTER — HOME CARE VISIT (OUTPATIENT)
Dept: SCHEDULING | Facility: HOME HEALTH | Age: 87
End: 2022-01-18
Payer: MEDICARE

## 2022-01-18 PROCEDURE — 3331090002 HH PPS REVENUE DEBIT

## 2022-01-18 PROCEDURE — G0299 HHS/HOSPICE OF RN EA 15 MIN: HCPCS

## 2022-01-18 PROCEDURE — 3331090001 HH PPS REVENUE CREDIT

## 2022-01-19 PROCEDURE — 3331090002 HH PPS REVENUE DEBIT

## 2022-01-19 PROCEDURE — 3331090001 HH PPS REVENUE CREDIT

## 2022-01-20 ENCOUNTER — HOME CARE VISIT (OUTPATIENT)
Dept: SCHEDULING | Facility: HOME HEALTH | Age: 87
End: 2022-01-20
Payer: MEDICARE

## 2022-01-20 PROCEDURE — 3331090001 HH PPS REVENUE CREDIT

## 2022-01-20 PROCEDURE — 3331090002 HH PPS REVENUE DEBIT

## 2022-01-20 PROCEDURE — G0299 HHS/HOSPICE OF RN EA 15 MIN: HCPCS

## 2022-01-21 ENCOUNTER — HOME CARE VISIT (OUTPATIENT)
Dept: HOME HEALTH SERVICES | Facility: HOME HEALTH | Age: 87
End: 2022-01-21
Payer: MEDICARE

## 2022-01-21 VITALS
RESPIRATION RATE: 20 BRPM | RESPIRATION RATE: 20 BRPM | HEART RATE: 84 BPM | OXYGEN SATURATION: 96 % | TEMPERATURE: 97.2 F | TEMPERATURE: 98.8 F | DIASTOLIC BLOOD PRESSURE: 90 MMHG | HEART RATE: 76 BPM | DIASTOLIC BLOOD PRESSURE: 90 MMHG | OXYGEN SATURATION: 96 % | SYSTOLIC BLOOD PRESSURE: 140 MMHG | SYSTOLIC BLOOD PRESSURE: 150 MMHG

## 2022-01-21 PROCEDURE — 3331090001 HH PPS REVENUE CREDIT

## 2022-01-21 PROCEDURE — 3331090002 HH PPS REVENUE DEBIT

## 2022-01-22 PROCEDURE — 3331090001 HH PPS REVENUE CREDIT

## 2022-01-22 PROCEDURE — 3331090002 HH PPS REVENUE DEBIT

## 2022-01-23 PROCEDURE — 3331090001 HH PPS REVENUE CREDIT

## 2022-01-23 PROCEDURE — 3331090002 HH PPS REVENUE DEBIT

## 2022-01-24 ENCOUNTER — HOME CARE VISIT (OUTPATIENT)
Dept: HOME HEALTH SERVICES | Facility: HOME HEALTH | Age: 87
End: 2022-01-24
Payer: MEDICARE

## 2022-01-24 PROCEDURE — 3331090002 HH PPS REVENUE DEBIT

## 2022-01-24 PROCEDURE — 3331090001 HH PPS REVENUE CREDIT

## 2022-01-25 ENCOUNTER — HOME CARE VISIT (OUTPATIENT)
Dept: SCHEDULING | Facility: HOME HEALTH | Age: 87
End: 2022-01-25
Payer: MEDICARE

## 2022-01-25 VITALS
RESPIRATION RATE: 20 BRPM | SYSTOLIC BLOOD PRESSURE: 120 MMHG | OXYGEN SATURATION: 95 % | TEMPERATURE: 97.8 F | HEART RATE: 95 BPM | DIASTOLIC BLOOD PRESSURE: 80 MMHG

## 2022-01-25 PROCEDURE — 3331090002 HH PPS REVENUE DEBIT

## 2022-01-25 PROCEDURE — 3331090001 HH PPS REVENUE CREDIT

## 2022-01-25 PROCEDURE — G0299 HHS/HOSPICE OF RN EA 15 MIN: HCPCS

## 2022-01-25 PROCEDURE — G0156 HHCP-SVS OF AIDE,EA 15 MIN: HCPCS

## 2022-01-26 PROCEDURE — 3331090001 HH PPS REVENUE CREDIT

## 2022-01-26 PROCEDURE — 3331090002 HH PPS REVENUE DEBIT

## 2022-01-27 ENCOUNTER — HOME CARE VISIT (OUTPATIENT)
Dept: SCHEDULING | Facility: HOME HEALTH | Age: 87
End: 2022-01-27
Payer: MEDICARE

## 2022-01-27 VITALS
TEMPERATURE: 97.5 F | RESPIRATION RATE: 20 BRPM | DIASTOLIC BLOOD PRESSURE: 80 MMHG | SYSTOLIC BLOOD PRESSURE: 140 MMHG | OXYGEN SATURATION: 98 % | HEART RATE: 67 BPM

## 2022-01-27 PROCEDURE — G0299 HHS/HOSPICE OF RN EA 15 MIN: HCPCS

## 2022-01-27 PROCEDURE — G0156 HHCP-SVS OF AIDE,EA 15 MIN: HCPCS

## 2022-01-27 PROCEDURE — 3331090002 HH PPS REVENUE DEBIT

## 2022-01-27 PROCEDURE — 3331090001 HH PPS REVENUE CREDIT

## 2022-01-28 PROCEDURE — 3331090002 HH PPS REVENUE DEBIT

## 2022-01-28 PROCEDURE — 3331090001 HH PPS REVENUE CREDIT

## 2022-01-29 PROCEDURE — 3331090002 HH PPS REVENUE DEBIT

## 2022-01-29 PROCEDURE — 3331090001 HH PPS REVENUE CREDIT

## 2022-01-30 PROCEDURE — 3331090001 HH PPS REVENUE CREDIT

## 2022-01-30 PROCEDURE — 3331090002 HH PPS REVENUE DEBIT

## 2022-01-31 ENCOUNTER — HOME CARE VISIT (OUTPATIENT)
Dept: SCHEDULING | Facility: HOME HEALTH | Age: 87
End: 2022-01-31
Payer: MEDICARE

## 2022-01-31 VITALS
RESPIRATION RATE: 22 BRPM | TEMPERATURE: 98 F | SYSTOLIC BLOOD PRESSURE: 116 MMHG | DIASTOLIC BLOOD PRESSURE: 80 MMHG | HEART RATE: 88 BPM | OXYGEN SATURATION: 96 %

## 2022-01-31 PROCEDURE — G0299 HHS/HOSPICE OF RN EA 15 MIN: HCPCS

## 2022-01-31 PROCEDURE — 3331090002 HH PPS REVENUE DEBIT

## 2022-01-31 PROCEDURE — 3331090001 HH PPS REVENUE CREDIT

## 2022-02-01 ENCOUNTER — HOME CARE VISIT (OUTPATIENT)
Dept: HOME HEALTH SERVICES | Facility: HOME HEALTH | Age: 87
End: 2022-02-01
Payer: MEDICARE

## 2022-02-01 PROCEDURE — 3331090001 HH PPS REVENUE CREDIT

## 2022-02-01 PROCEDURE — 3331090002 HH PPS REVENUE DEBIT

## 2022-02-02 PROCEDURE — 3331090001 HH PPS REVENUE CREDIT

## 2022-02-02 PROCEDURE — 3331090002 HH PPS REVENUE DEBIT

## 2022-02-03 ENCOUNTER — HOME CARE VISIT (OUTPATIENT)
Dept: SCHEDULING | Facility: HOME HEALTH | Age: 87
End: 2022-02-03
Payer: MEDICARE

## 2022-02-03 PROCEDURE — G0156 HHCP-SVS OF AIDE,EA 15 MIN: HCPCS

## 2022-02-03 PROCEDURE — 3331090002 HH PPS REVENUE DEBIT

## 2022-02-03 PROCEDURE — 3331090001 HH PPS REVENUE CREDIT

## 2022-02-04 ENCOUNTER — HOME CARE VISIT (OUTPATIENT)
Dept: SCHEDULING | Facility: HOME HEALTH | Age: 87
End: 2022-02-04
Payer: MEDICARE

## 2022-02-04 VITALS
TEMPERATURE: 98 F | DIASTOLIC BLOOD PRESSURE: 80 MMHG | HEART RATE: 65 BPM | RESPIRATION RATE: 20 BRPM | SYSTOLIC BLOOD PRESSURE: 130 MMHG | OXYGEN SATURATION: 97 %

## 2022-02-04 PROCEDURE — 3331090001 HH PPS REVENUE CREDIT

## 2022-02-04 PROCEDURE — 3331090002 HH PPS REVENUE DEBIT

## 2022-02-04 PROCEDURE — G0299 HHS/HOSPICE OF RN EA 15 MIN: HCPCS

## 2022-02-05 PROCEDURE — 3331090001 HH PPS REVENUE CREDIT

## 2022-02-05 PROCEDURE — 3331090002 HH PPS REVENUE DEBIT

## 2022-02-06 PROCEDURE — 3331090002 HH PPS REVENUE DEBIT

## 2022-02-06 PROCEDURE — 3331090001 HH PPS REVENUE CREDIT

## 2022-02-07 ENCOUNTER — HOME CARE VISIT (OUTPATIENT)
Dept: SCHEDULING | Facility: HOME HEALTH | Age: 87
End: 2022-02-07
Payer: MEDICARE

## 2022-02-07 VITALS
DIASTOLIC BLOOD PRESSURE: 82 MMHG | OXYGEN SATURATION: 98 % | RESPIRATION RATE: 20 BRPM | SYSTOLIC BLOOD PRESSURE: 120 MMHG | HEART RATE: 84 BPM | TEMPERATURE: 98.2 F

## 2022-02-07 PROCEDURE — G0299 HHS/HOSPICE OF RN EA 15 MIN: HCPCS

## 2022-02-07 PROCEDURE — 400014 HH F/U

## 2022-02-07 PROCEDURE — 3331090002 HH PPS REVENUE DEBIT

## 2022-02-07 PROCEDURE — 3331090001 HH PPS REVENUE CREDIT

## 2022-02-08 ENCOUNTER — HOME CARE VISIT (OUTPATIENT)
Dept: SCHEDULING | Facility: HOME HEALTH | Age: 87
End: 2022-02-08
Payer: MEDICARE

## 2022-02-08 PROCEDURE — G0156 HHCP-SVS OF AIDE,EA 15 MIN: HCPCS

## 2022-02-08 PROCEDURE — 3331090001 HH PPS REVENUE CREDIT

## 2022-02-08 PROCEDURE — 3331090002 HH PPS REVENUE DEBIT

## 2022-02-09 PROCEDURE — 3331090002 HH PPS REVENUE DEBIT

## 2022-02-09 PROCEDURE — 3331090001 HH PPS REVENUE CREDIT

## 2022-02-10 ENCOUNTER — HOME CARE VISIT (OUTPATIENT)
Dept: HOME HEALTH SERVICES | Facility: HOME HEALTH | Age: 87
End: 2022-02-10
Payer: MEDICARE

## 2022-02-10 PROCEDURE — 3331090001 HH PPS REVENUE CREDIT

## 2022-02-10 PROCEDURE — 3331090002 HH PPS REVENUE DEBIT

## 2022-02-11 ENCOUNTER — HOME CARE VISIT (OUTPATIENT)
Dept: SCHEDULING | Facility: HOME HEALTH | Age: 87
End: 2022-02-11
Payer: MEDICARE

## 2022-02-11 PROCEDURE — 3331090001 HH PPS REVENUE CREDIT

## 2022-02-11 PROCEDURE — 3331090002 HH PPS REVENUE DEBIT

## 2022-02-11 PROCEDURE — G0299 HHS/HOSPICE OF RN EA 15 MIN: HCPCS

## 2022-02-12 VITALS
HEART RATE: 68 BPM | DIASTOLIC BLOOD PRESSURE: 70 MMHG | SYSTOLIC BLOOD PRESSURE: 140 MMHG | RESPIRATION RATE: 20 BRPM | TEMPERATURE: 98 F | OXYGEN SATURATION: 97 %

## 2022-02-12 PROCEDURE — 3331090002 HH PPS REVENUE DEBIT

## 2022-02-12 PROCEDURE — 3331090001 HH PPS REVENUE CREDIT

## 2022-02-13 PROCEDURE — 3331090002 HH PPS REVENUE DEBIT

## 2022-02-13 PROCEDURE — 3331090001 HH PPS REVENUE CREDIT

## 2022-02-14 ENCOUNTER — HOME CARE VISIT (OUTPATIENT)
Dept: SCHEDULING | Facility: HOME HEALTH | Age: 87
End: 2022-02-14
Payer: MEDICARE

## 2022-02-14 VITALS
TEMPERATURE: 98.3 F | OXYGEN SATURATION: 98 % | SYSTOLIC BLOOD PRESSURE: 110 MMHG | HEART RATE: 73 BPM | RESPIRATION RATE: 20 BRPM | DIASTOLIC BLOOD PRESSURE: 80 MMHG

## 2022-02-14 VITALS — RESPIRATION RATE: 18 BRPM | OXYGEN SATURATION: 99 % | TEMPERATURE: 97.3 F | HEART RATE: 78 BPM

## 2022-02-14 PROCEDURE — 3331090002 HH PPS REVENUE DEBIT

## 2022-02-14 PROCEDURE — G0299 HHS/HOSPICE OF RN EA 15 MIN: HCPCS

## 2022-02-14 PROCEDURE — G0151 HHCP-SERV OF PT,EA 15 MIN: HCPCS

## 2022-02-14 PROCEDURE — 3331090001 HH PPS REVENUE CREDIT

## 2022-02-15 ENCOUNTER — HOME CARE VISIT (OUTPATIENT)
Dept: SCHEDULING | Facility: HOME HEALTH | Age: 87
End: 2022-02-15
Payer: MEDICARE

## 2022-02-15 PROCEDURE — G0156 HHCP-SVS OF AIDE,EA 15 MIN: HCPCS

## 2022-02-15 PROCEDURE — 3331090001 HH PPS REVENUE CREDIT

## 2022-02-15 PROCEDURE — 3331090002 HH PPS REVENUE DEBIT

## 2022-02-16 ENCOUNTER — HOME CARE VISIT (OUTPATIENT)
Dept: SCHEDULING | Facility: HOME HEALTH | Age: 87
End: 2022-02-16
Payer: MEDICARE

## 2022-02-16 PROCEDURE — G0157 HHC PT ASSISTANT EA 15: HCPCS

## 2022-02-16 PROCEDURE — 3331090001 HH PPS REVENUE CREDIT

## 2022-02-16 PROCEDURE — 3331090002 HH PPS REVENUE DEBIT

## 2022-02-17 ENCOUNTER — HOME CARE VISIT (OUTPATIENT)
Dept: SCHEDULING | Facility: HOME HEALTH | Age: 87
End: 2022-02-17
Payer: MEDICARE

## 2022-02-17 VITALS
HEART RATE: 74 BPM | RESPIRATION RATE: 20 BRPM | DIASTOLIC BLOOD PRESSURE: 70 MMHG | TEMPERATURE: 98.5 F | OXYGEN SATURATION: 98 % | SYSTOLIC BLOOD PRESSURE: 124 MMHG

## 2022-02-17 VITALS
OXYGEN SATURATION: 97 % | HEART RATE: 75 BPM | DIASTOLIC BLOOD PRESSURE: 61 MMHG | RESPIRATION RATE: 16 BRPM | TEMPERATURE: 98.1 F | SYSTOLIC BLOOD PRESSURE: 118 MMHG

## 2022-02-17 PROCEDURE — 3331090001 HH PPS REVENUE CREDIT

## 2022-02-17 PROCEDURE — 3331090002 HH PPS REVENUE DEBIT

## 2022-02-17 PROCEDURE — G0299 HHS/HOSPICE OF RN EA 15 MIN: HCPCS

## 2022-02-17 PROCEDURE — G0156 HHCP-SVS OF AIDE,EA 15 MIN: HCPCS

## 2022-02-18 PROCEDURE — 3331090001 HH PPS REVENUE CREDIT

## 2022-02-18 PROCEDURE — 3331090002 HH PPS REVENUE DEBIT

## 2022-02-19 PROCEDURE — 3331090002 HH PPS REVENUE DEBIT

## 2022-02-19 PROCEDURE — 3331090001 HH PPS REVENUE CREDIT

## 2022-02-20 PROCEDURE — 3331090002 HH PPS REVENUE DEBIT

## 2022-02-20 PROCEDURE — 3331090001 HH PPS REVENUE CREDIT

## 2022-02-21 ENCOUNTER — HOME CARE VISIT (OUTPATIENT)
Dept: SCHEDULING | Facility: HOME HEALTH | Age: 87
End: 2022-02-21
Payer: MEDICARE

## 2022-02-21 VITALS
RESPIRATION RATE: 20 BRPM | OXYGEN SATURATION: 97 % | SYSTOLIC BLOOD PRESSURE: 130 MMHG | TEMPERATURE: 98.8 F | HEART RATE: 79 BPM | DIASTOLIC BLOOD PRESSURE: 80 MMHG

## 2022-02-21 PROCEDURE — 3331090002 HH PPS REVENUE DEBIT

## 2022-02-21 PROCEDURE — G0299 HHS/HOSPICE OF RN EA 15 MIN: HCPCS

## 2022-02-21 PROCEDURE — 3331090001 HH PPS REVENUE CREDIT

## 2022-02-22 ENCOUNTER — HOME CARE VISIT (OUTPATIENT)
Dept: SCHEDULING | Facility: HOME HEALTH | Age: 87
End: 2022-02-22
Payer: MEDICARE

## 2022-02-22 ENCOUNTER — HOME CARE VISIT (OUTPATIENT)
Dept: HOME HEALTH SERVICES | Facility: HOME HEALTH | Age: 87
End: 2022-02-22
Payer: MEDICARE

## 2022-02-22 VITALS
SYSTOLIC BLOOD PRESSURE: 117 MMHG | HEART RATE: 91 BPM | RESPIRATION RATE: 18 BRPM | TEMPERATURE: 98.1 F | OXYGEN SATURATION: 98 % | DIASTOLIC BLOOD PRESSURE: 74 MMHG

## 2022-02-22 PROCEDURE — 3331090001 HH PPS REVENUE CREDIT

## 2022-02-22 PROCEDURE — 3331090002 HH PPS REVENUE DEBIT

## 2022-02-22 PROCEDURE — G0157 HHC PT ASSISTANT EA 15: HCPCS

## 2022-02-22 PROCEDURE — G0156 HHCP-SVS OF AIDE,EA 15 MIN: HCPCS

## 2022-02-23 PROCEDURE — 3331090002 HH PPS REVENUE DEBIT

## 2022-02-23 PROCEDURE — 3331090001 HH PPS REVENUE CREDIT

## 2022-02-24 ENCOUNTER — HOME CARE VISIT (OUTPATIENT)
Dept: SCHEDULING | Facility: HOME HEALTH | Age: 87
End: 2022-02-24
Payer: MEDICARE

## 2022-02-24 PROCEDURE — 3331090001 HH PPS REVENUE CREDIT

## 2022-02-24 PROCEDURE — G0156 HHCP-SVS OF AIDE,EA 15 MIN: HCPCS

## 2022-02-24 PROCEDURE — 3331090002 HH PPS REVENUE DEBIT

## 2022-02-25 ENCOUNTER — HOME CARE VISIT (OUTPATIENT)
Dept: SCHEDULING | Facility: HOME HEALTH | Age: 87
End: 2022-02-25
Payer: MEDICARE

## 2022-02-25 PROCEDURE — 3331090001 HH PPS REVENUE CREDIT

## 2022-02-25 PROCEDURE — G0299 HHS/HOSPICE OF RN EA 15 MIN: HCPCS

## 2022-02-25 PROCEDURE — G0157 HHC PT ASSISTANT EA 15: HCPCS

## 2022-02-25 PROCEDURE — 3331090002 HH PPS REVENUE DEBIT

## 2022-02-26 PROCEDURE — 3331090001 HH PPS REVENUE CREDIT

## 2022-02-26 PROCEDURE — 3331090002 HH PPS REVENUE DEBIT

## 2022-02-27 VITALS
TEMPERATURE: 97.8 F | SYSTOLIC BLOOD PRESSURE: 130 MMHG | RESPIRATION RATE: 20 BRPM | DIASTOLIC BLOOD PRESSURE: 80 MMHG | OXYGEN SATURATION: 96 % | HEART RATE: 81 BPM

## 2022-02-27 PROCEDURE — 3331090001 HH PPS REVENUE CREDIT

## 2022-02-27 PROCEDURE — 3331090002 HH PPS REVENUE DEBIT

## 2022-02-28 ENCOUNTER — HOME CARE VISIT (OUTPATIENT)
Dept: SCHEDULING | Facility: HOME HEALTH | Age: 87
End: 2022-02-28
Payer: MEDICARE

## 2022-02-28 VITALS
RESPIRATION RATE: 20 BRPM | HEART RATE: 74 BPM | OXYGEN SATURATION: 98 % | DIASTOLIC BLOOD PRESSURE: 82 MMHG | SYSTOLIC BLOOD PRESSURE: 120 MMHG | TEMPERATURE: 98.4 F

## 2022-02-28 VITALS
HEART RATE: 82 BPM | DIASTOLIC BLOOD PRESSURE: 65 MMHG | RESPIRATION RATE: 20 BRPM | SYSTOLIC BLOOD PRESSURE: 107 MMHG | TEMPERATURE: 97.9 F | OXYGEN SATURATION: 97 %

## 2022-02-28 PROCEDURE — 3331090001 HH PPS REVENUE CREDIT

## 2022-02-28 PROCEDURE — 3331090002 HH PPS REVENUE DEBIT

## 2022-02-28 PROCEDURE — G0299 HHS/HOSPICE OF RN EA 15 MIN: HCPCS

## 2022-03-01 ENCOUNTER — HOME CARE VISIT (OUTPATIENT)
Dept: SCHEDULING | Facility: HOME HEALTH | Age: 87
End: 2022-03-01
Payer: MEDICARE

## 2022-03-01 PROCEDURE — 3331090002 HH PPS REVENUE DEBIT

## 2022-03-01 PROCEDURE — 3331090001 HH PPS REVENUE CREDIT

## 2022-03-01 PROCEDURE — G0157 HHC PT ASSISTANT EA 15: HCPCS

## 2022-03-01 PROCEDURE — G0156 HHCP-SVS OF AIDE,EA 15 MIN: HCPCS

## 2022-03-02 PROCEDURE — 3331090002 HH PPS REVENUE DEBIT

## 2022-03-02 PROCEDURE — 3331090001 HH PPS REVENUE CREDIT

## 2022-03-03 ENCOUNTER — HOME CARE VISIT (OUTPATIENT)
Dept: SCHEDULING | Facility: HOME HEALTH | Age: 87
End: 2022-03-03
Payer: MEDICARE

## 2022-03-03 PROCEDURE — G0156 HHCP-SVS OF AIDE,EA 15 MIN: HCPCS

## 2022-03-03 PROCEDURE — 3331090002 HH PPS REVENUE DEBIT

## 2022-03-03 PROCEDURE — 3331090001 HH PPS REVENUE CREDIT

## 2022-03-04 ENCOUNTER — HOME CARE VISIT (OUTPATIENT)
Dept: SCHEDULING | Facility: HOME HEALTH | Age: 87
End: 2022-03-04
Payer: MEDICARE

## 2022-03-04 VITALS
TEMPERATURE: 98.6 F | DIASTOLIC BLOOD PRESSURE: 80 MMHG | RESPIRATION RATE: 20 BRPM | OXYGEN SATURATION: 96 % | SYSTOLIC BLOOD PRESSURE: 120 MMHG | HEART RATE: 74 BPM

## 2022-03-04 VITALS
SYSTOLIC BLOOD PRESSURE: 110 MMHG | DIASTOLIC BLOOD PRESSURE: 77 MMHG | HEART RATE: 71 BPM | OXYGEN SATURATION: 98 % | RESPIRATION RATE: 16 BRPM | TEMPERATURE: 98.2 F

## 2022-03-04 PROCEDURE — G0299 HHS/HOSPICE OF RN EA 15 MIN: HCPCS

## 2022-03-04 PROCEDURE — 3331090002 HH PPS REVENUE DEBIT

## 2022-03-04 PROCEDURE — G0157 HHC PT ASSISTANT EA 15: HCPCS

## 2022-03-04 PROCEDURE — 3331090001 HH PPS REVENUE CREDIT

## 2022-03-05 PROCEDURE — 3331090002 HH PPS REVENUE DEBIT

## 2022-03-05 PROCEDURE — 3331090001 HH PPS REVENUE CREDIT

## 2022-03-06 PROCEDURE — 3331090002 HH PPS REVENUE DEBIT

## 2022-03-06 PROCEDURE — 3331090001 HH PPS REVENUE CREDIT

## 2022-03-07 PROCEDURE — 3331090002 HH PPS REVENUE DEBIT

## 2022-03-07 PROCEDURE — 3331090003 HH PPS REVENUE ADJ

## 2022-03-07 PROCEDURE — 3331090001 HH PPS REVENUE CREDIT

## 2022-03-08 ENCOUNTER — HOME CARE VISIT (OUTPATIENT)
Dept: SCHEDULING | Facility: HOME HEALTH | Age: 87
End: 2022-03-08
Payer: MEDICARE

## 2022-03-08 VITALS
RESPIRATION RATE: 20 BRPM | TEMPERATURE: 98.6 F | DIASTOLIC BLOOD PRESSURE: 80 MMHG | HEART RATE: 74 BPM | OXYGEN SATURATION: 96 % | SYSTOLIC BLOOD PRESSURE: 120 MMHG

## 2022-03-08 PROCEDURE — G0157 HHC PT ASSISTANT EA 15: HCPCS

## 2022-03-08 PROCEDURE — 400014 HH F/U

## 2022-03-08 PROCEDURE — G0156 HHCP-SVS OF AIDE,EA 15 MIN: HCPCS

## 2022-03-08 PROCEDURE — 3331090001 HH PPS REVENUE CREDIT

## 2022-03-08 PROCEDURE — 3331090002 HH PPS REVENUE DEBIT

## 2022-03-09 VITALS
DIASTOLIC BLOOD PRESSURE: 70 MMHG | SYSTOLIC BLOOD PRESSURE: 121 MMHG | OXYGEN SATURATION: 97 % | TEMPERATURE: 98.2 F | RESPIRATION RATE: 18 BRPM | HEART RATE: 77 BPM

## 2022-03-09 PROCEDURE — 3331090002 HH PPS REVENUE DEBIT

## 2022-03-09 PROCEDURE — 3331090001 HH PPS REVENUE CREDIT

## 2022-03-10 ENCOUNTER — HOME CARE VISIT (OUTPATIENT)
Dept: SCHEDULING | Facility: HOME HEALTH | Age: 87
End: 2022-03-10
Payer: MEDICARE

## 2022-03-10 ENCOUNTER — HOME CARE VISIT (OUTPATIENT)
Dept: HOME HEALTH SERVICES | Facility: HOME HEALTH | Age: 87
End: 2022-03-10
Payer: MEDICARE

## 2022-03-10 VITALS
SYSTOLIC BLOOD PRESSURE: 140 MMHG | DIASTOLIC BLOOD PRESSURE: 80 MMHG | OXYGEN SATURATION: 97 % | TEMPERATURE: 98.2 F | HEART RATE: 89 BPM | RESPIRATION RATE: 20 BRPM

## 2022-03-10 PROCEDURE — G0299 HHS/HOSPICE OF RN EA 15 MIN: HCPCS

## 2022-03-10 PROCEDURE — 3331090001 HH PPS REVENUE CREDIT

## 2022-03-10 PROCEDURE — G0156 HHCP-SVS OF AIDE,EA 15 MIN: HCPCS

## 2022-03-10 PROCEDURE — 3331090002 HH PPS REVENUE DEBIT

## 2022-03-11 ENCOUNTER — HOME CARE VISIT (OUTPATIENT)
Dept: SCHEDULING | Facility: HOME HEALTH | Age: 87
End: 2022-03-11
Payer: MEDICARE

## 2022-03-11 VITALS
SYSTOLIC BLOOD PRESSURE: 124 MMHG | OXYGEN SATURATION: 98 % | DIASTOLIC BLOOD PRESSURE: 74 MMHG | RESPIRATION RATE: 18 BRPM | TEMPERATURE: 97.5 F | HEART RATE: 71 BPM

## 2022-03-11 PROCEDURE — 3331090001 HH PPS REVENUE CREDIT

## 2022-03-11 PROCEDURE — G0151 HHCP-SERV OF PT,EA 15 MIN: HCPCS

## 2022-03-11 PROCEDURE — 3331090002 HH PPS REVENUE DEBIT

## 2022-03-12 PROCEDURE — 3331090002 HH PPS REVENUE DEBIT

## 2022-03-12 PROCEDURE — 3331090001 HH PPS REVENUE CREDIT

## 2022-03-13 PROCEDURE — 3331090002 HH PPS REVENUE DEBIT

## 2022-03-13 PROCEDURE — 3331090001 HH PPS REVENUE CREDIT

## 2022-03-14 ENCOUNTER — HOME CARE VISIT (OUTPATIENT)
Dept: SCHEDULING | Facility: HOME HEALTH | Age: 87
End: 2022-03-14
Payer: MEDICARE

## 2022-03-14 PROCEDURE — 3331090002 HH PPS REVENUE DEBIT

## 2022-03-14 PROCEDURE — G0156 HHCP-SVS OF AIDE,EA 15 MIN: HCPCS

## 2022-03-14 PROCEDURE — 3331090001 HH PPS REVENUE CREDIT

## 2022-03-15 ENCOUNTER — HOME CARE VISIT (OUTPATIENT)
Dept: SCHEDULING | Facility: HOME HEALTH | Age: 87
End: 2022-03-15
Payer: MEDICARE

## 2022-03-15 PROCEDURE — G0157 HHC PT ASSISTANT EA 15: HCPCS

## 2022-03-15 PROCEDURE — 3331090001 HH PPS REVENUE CREDIT

## 2022-03-15 PROCEDURE — 3331090002 HH PPS REVENUE DEBIT

## 2022-03-16 ENCOUNTER — HOME CARE VISIT (OUTPATIENT)
Dept: SCHEDULING | Facility: HOME HEALTH | Age: 87
End: 2022-03-16
Payer: MEDICARE

## 2022-03-16 PROCEDURE — G0299 HHS/HOSPICE OF RN EA 15 MIN: HCPCS

## 2022-03-16 PROCEDURE — 3331090001 HH PPS REVENUE CREDIT

## 2022-03-16 PROCEDURE — 3331090002 HH PPS REVENUE DEBIT

## 2022-03-17 ENCOUNTER — HOME CARE VISIT (OUTPATIENT)
Dept: SCHEDULING | Facility: HOME HEALTH | Age: 87
End: 2022-03-17
Payer: MEDICARE

## 2022-03-17 VITALS
OXYGEN SATURATION: 93 % | SYSTOLIC BLOOD PRESSURE: 124 MMHG | HEART RATE: 77 BPM | TEMPERATURE: 97.8 F | RESPIRATION RATE: 20 BRPM | DIASTOLIC BLOOD PRESSURE: 88 MMHG

## 2022-03-17 VITALS
RESPIRATION RATE: 18 BRPM | SYSTOLIC BLOOD PRESSURE: 106 MMHG | DIASTOLIC BLOOD PRESSURE: 73 MMHG | HEART RATE: 74 BPM | OXYGEN SATURATION: 97 % | TEMPERATURE: 98 F

## 2022-03-17 PROCEDURE — 3331090001 HH PPS REVENUE CREDIT

## 2022-03-17 PROCEDURE — G0156 HHCP-SVS OF AIDE,EA 15 MIN: HCPCS

## 2022-03-17 PROCEDURE — 3331090002 HH PPS REVENUE DEBIT

## 2022-03-18 ENCOUNTER — HOME CARE VISIT (OUTPATIENT)
Dept: SCHEDULING | Facility: HOME HEALTH | Age: 87
End: 2022-03-18
Payer: MEDICARE

## 2022-03-18 PROCEDURE — G0157 HHC PT ASSISTANT EA 15: HCPCS

## 2022-03-18 PROCEDURE — 3331090002 HH PPS REVENUE DEBIT

## 2022-03-18 PROCEDURE — 3331090001 HH PPS REVENUE CREDIT

## 2022-03-19 PROBLEM — I50.9 ACUTE EXACERBATION OF CHF (CONGESTIVE HEART FAILURE) (HCC): Status: ACTIVE | Noted: 2021-08-10

## 2022-03-19 PROCEDURE — 3331090002 HH PPS REVENUE DEBIT

## 2022-03-19 PROCEDURE — 3331090001 HH PPS REVENUE CREDIT

## 2022-03-20 PROCEDURE — 3331090001 HH PPS REVENUE CREDIT

## 2022-03-20 PROCEDURE — 3331090002 HH PPS REVENUE DEBIT

## 2022-03-21 VITALS
OXYGEN SATURATION: 97 % | TEMPERATURE: 98.1 F | HEART RATE: 76 BPM | SYSTOLIC BLOOD PRESSURE: 120 MMHG | DIASTOLIC BLOOD PRESSURE: 76 MMHG | RESPIRATION RATE: 18 BRPM

## 2022-03-21 PROCEDURE — 3331090001 HH PPS REVENUE CREDIT

## 2022-03-21 PROCEDURE — 3331090002 HH PPS REVENUE DEBIT

## 2022-03-22 ENCOUNTER — HOME CARE VISIT (OUTPATIENT)
Dept: HOME HEALTH SERVICES | Facility: HOME HEALTH | Age: 87
End: 2022-03-22
Payer: MEDICARE

## 2022-03-22 ENCOUNTER — HOME CARE VISIT (OUTPATIENT)
Dept: SCHEDULING | Facility: HOME HEALTH | Age: 87
End: 2022-03-22
Payer: MEDICARE

## 2022-03-22 PROCEDURE — 3331090002 HH PPS REVENUE DEBIT

## 2022-03-22 PROCEDURE — G0157 HHC PT ASSISTANT EA 15: HCPCS

## 2022-03-22 PROCEDURE — 3331090001 HH PPS REVENUE CREDIT

## 2022-03-23 ENCOUNTER — HOME CARE VISIT (OUTPATIENT)
Dept: SCHEDULING | Facility: HOME HEALTH | Age: 87
End: 2022-03-23
Payer: MEDICARE

## 2022-03-23 VITALS
TEMPERATURE: 98.3 F | DIASTOLIC BLOOD PRESSURE: 80 MMHG | OXYGEN SATURATION: 97 % | HEART RATE: 72 BPM | SYSTOLIC BLOOD PRESSURE: 140 MMHG | RESPIRATION RATE: 18 BRPM

## 2022-03-23 PROCEDURE — G0299 HHS/HOSPICE OF RN EA 15 MIN: HCPCS

## 2022-03-23 PROCEDURE — 3331090001 HH PPS REVENUE CREDIT

## 2022-03-23 PROCEDURE — 3331090002 HH PPS REVENUE DEBIT

## 2022-03-24 ENCOUNTER — HOME CARE VISIT (OUTPATIENT)
Dept: SCHEDULING | Facility: HOME HEALTH | Age: 87
End: 2022-03-24
Payer: MEDICARE

## 2022-03-24 VITALS
SYSTOLIC BLOOD PRESSURE: 118 MMHG | OXYGEN SATURATION: 99 % | TEMPERATURE: 97.3 F | DIASTOLIC BLOOD PRESSURE: 70 MMHG | RESPIRATION RATE: 16 BRPM | HEART RATE: 64 BPM

## 2022-03-24 PROCEDURE — 3331090001 HH PPS REVENUE CREDIT

## 2022-03-24 PROCEDURE — G0156 HHCP-SVS OF AIDE,EA 15 MIN: HCPCS

## 2022-03-24 PROCEDURE — G0151 HHCP-SERV OF PT,EA 15 MIN: HCPCS

## 2022-03-24 PROCEDURE — 3331090002 HH PPS REVENUE DEBIT

## 2022-03-25 VITALS
OXYGEN SATURATION: 94 % | DIASTOLIC BLOOD PRESSURE: 74 MMHG | SYSTOLIC BLOOD PRESSURE: 122 MMHG | RESPIRATION RATE: 20 BRPM | HEART RATE: 84 BPM | TEMPERATURE: 98.1 F

## 2022-03-25 PROCEDURE — 3331090001 HH PPS REVENUE CREDIT

## 2022-03-25 PROCEDURE — 3331090002 HH PPS REVENUE DEBIT

## 2022-03-26 PROCEDURE — 3331090001 HH PPS REVENUE CREDIT

## 2022-03-26 PROCEDURE — 3331090002 HH PPS REVENUE DEBIT

## 2022-03-27 PROCEDURE — 3331090001 HH PPS REVENUE CREDIT

## 2022-03-27 PROCEDURE — 3331090002 HH PPS REVENUE DEBIT

## 2022-03-28 PROCEDURE — 3331090002 HH PPS REVENUE DEBIT

## 2022-03-28 PROCEDURE — 3331090001 HH PPS REVENUE CREDIT

## 2022-03-29 ENCOUNTER — HOME CARE VISIT (OUTPATIENT)
Dept: SCHEDULING | Facility: HOME HEALTH | Age: 87
End: 2022-03-29
Payer: MEDICARE

## 2022-03-29 PROCEDURE — 3331090002 HH PPS REVENUE DEBIT

## 2022-03-29 PROCEDURE — G0156 HHCP-SVS OF AIDE,EA 15 MIN: HCPCS

## 2022-03-29 PROCEDURE — 3331090001 HH PPS REVENUE CREDIT

## 2022-03-30 ENCOUNTER — HOME CARE VISIT (OUTPATIENT)
Dept: SCHEDULING | Facility: HOME HEALTH | Age: 87
End: 2022-03-30
Payer: MEDICARE

## 2022-03-30 ENCOUNTER — HOME CARE VISIT (OUTPATIENT)
Dept: HOME HEALTH SERVICES | Facility: HOME HEALTH | Age: 87
End: 2022-03-30
Payer: MEDICARE

## 2022-03-30 VITALS
OXYGEN SATURATION: 98 % | RESPIRATION RATE: 20 BRPM | TEMPERATURE: 97.8 F | HEART RATE: 85 BPM | SYSTOLIC BLOOD PRESSURE: 120 MMHG | DIASTOLIC BLOOD PRESSURE: 80 MMHG

## 2022-03-30 PROCEDURE — 3331090001 HH PPS REVENUE CREDIT

## 2022-03-30 PROCEDURE — G0299 HHS/HOSPICE OF RN EA 15 MIN: HCPCS

## 2022-03-30 PROCEDURE — 3331090002 HH PPS REVENUE DEBIT

## 2022-03-31 ENCOUNTER — HOME CARE VISIT (OUTPATIENT)
Dept: SCHEDULING | Facility: HOME HEALTH | Age: 87
End: 2022-03-31
Payer: MEDICARE

## 2022-03-31 PROCEDURE — 3331090001 HH PPS REVENUE CREDIT

## 2022-03-31 PROCEDURE — 3331090002 HH PPS REVENUE DEBIT

## 2022-03-31 PROCEDURE — G0156 HHCP-SVS OF AIDE,EA 15 MIN: HCPCS

## 2022-04-01 PROCEDURE — 3331090001 HH PPS REVENUE CREDIT

## 2022-04-01 PROCEDURE — 3331090002 HH PPS REVENUE DEBIT

## 2022-04-02 PROCEDURE — 3331090002 HH PPS REVENUE DEBIT

## 2022-04-02 PROCEDURE — 3331090001 HH PPS REVENUE CREDIT

## 2022-04-03 PROCEDURE — 3331090002 HH PPS REVENUE DEBIT

## 2022-04-03 PROCEDURE — 3331090001 HH PPS REVENUE CREDIT

## 2022-04-04 PROCEDURE — 3331090002 HH PPS REVENUE DEBIT

## 2022-04-04 PROCEDURE — 3331090001 HH PPS REVENUE CREDIT

## 2022-04-05 PROCEDURE — 3331090001 HH PPS REVENUE CREDIT

## 2022-04-05 PROCEDURE — 3331090002 HH PPS REVENUE DEBIT

## 2022-04-06 ENCOUNTER — HOME CARE VISIT (OUTPATIENT)
Dept: SCHEDULING | Facility: HOME HEALTH | Age: 87
End: 2022-04-06
Payer: MEDICARE

## 2022-04-06 ENCOUNTER — HOME CARE VISIT (OUTPATIENT)
Dept: HOME HEALTH SERVICES | Facility: HOME HEALTH | Age: 87
End: 2022-04-06
Payer: MEDICARE

## 2022-04-06 PROCEDURE — 3331090002 HH PPS REVENUE DEBIT

## 2022-04-06 PROCEDURE — G0299 HHS/HOSPICE OF RN EA 15 MIN: HCPCS

## 2022-04-06 PROCEDURE — 3331090001 HH PPS REVENUE CREDIT

## 2022-04-07 VITALS
SYSTOLIC BLOOD PRESSURE: 110 MMHG | RESPIRATION RATE: 20 BRPM | DIASTOLIC BLOOD PRESSURE: 80 MMHG | TEMPERATURE: 98.4 F | HEART RATE: 63 BPM | OXYGEN SATURATION: 95 %

## 2023-06-09 ENCOUNTER — APPOINTMENT (OUTPATIENT)
Facility: HOSPITAL | Age: 88
End: 2023-06-09
Payer: MEDICARE

## 2023-06-09 ENCOUNTER — HOSPITAL ENCOUNTER (EMERGENCY)
Facility: HOSPITAL | Age: 88
Discharge: HOME | End: 2023-06-09
Attending: EMERGENCY MEDICINE
Payer: MEDICARE

## 2023-06-09 VITALS
RESPIRATION RATE: 16 BRPM | TEMPERATURE: 97.7 F | WEIGHT: 192.46 LBS | DIASTOLIC BLOOD PRESSURE: 103 MMHG | OXYGEN SATURATION: 97 % | HEIGHT: 68 IN | BODY MASS INDEX: 29.17 KG/M2 | HEART RATE: 78 BPM | SYSTOLIC BLOOD PRESSURE: 168 MMHG

## 2023-06-09 DIAGNOSIS — S81.812A SKIN TEAR OF LEFT LOWER LEG WITHOUT COMPLICATION, INITIAL ENCOUNTER: ICD-10-CM

## 2023-06-09 DIAGNOSIS — N39.0 URINARY TRACT INFECTION WITHOUT HEMATURIA, SITE UNSPECIFIED: ICD-10-CM

## 2023-06-09 DIAGNOSIS — Z23 NEED FOR DIPHTHERIA-TETANUS-PERTUSSIS (TDAP) VACCINE: ICD-10-CM
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
## 2023-06-09 LAB
APPEARANCE UR: CLEAR
BACTERIA URNS QL MICRO: ABNORMAL /HPF
BILIRUB UR QL: NEGATIVE
COLOR UR: ABNORMAL
EPITH CASTS URNS QL MICRO: ABNORMAL /LPF
GLUCOSE UR STRIP.AUTO-MCNC: NEGATIVE MG/DL
HGB UR QL STRIP: ABNORMAL
KETONES UR QL STRIP.AUTO: NEGATIVE MG/DL
LEUKOCYTE ESTERASE UR QL STRIP.AUTO: ABNORMAL
NITRITE UR QL STRIP.AUTO: POSITIVE
PH UR STRIP: 7 (ref 5–8)
PROT UR STRIP-MCNC: NEGATIVE MG/DL
RBC #/AREA URNS HPF: ABNORMAL /HPF (ref 0–5)
SP GR UR REFRACTOMETRY: 1.01
URINE CULTURE IF INDICATED: ABNORMAL
UROBILINOGEN UR QL STRIP.AUTO: 0.2 EU/DL (ref 0.2–1)
WBC URNS QL MICRO: ABNORMAL /HPF (ref 0–4)

## 2023-06-09 PROCEDURE — 73600 X-RAY EXAM OF ANKLE: CPT

## 2023-06-09 PROCEDURE — 99284 EMERGENCY DEPT VISIT MOD MDM: CPT

## 2023-06-09 PROCEDURE — 87077 CULTURE AEROBIC IDENTIFY: CPT

## 2023-06-09 PROCEDURE — 73590 X-RAY EXAM OF LOWER LEG: CPT

## 2023-06-09 PROCEDURE — 6360000002 HC RX W HCPCS: Performed by: PHYSICIAN ASSISTANT

## 2023-06-09 PROCEDURE — 90471 IMMUNIZATION ADMIN: CPT | Performed by: PHYSICIAN ASSISTANT

## 2023-06-09 PROCEDURE — 87186 SC STD MICRODIL/AGAR DIL: CPT

## 2023-06-09 PROCEDURE — 81001 URINALYSIS AUTO W/SCOPE: CPT

## 2023-06-09 PROCEDURE — 90715 TDAP VACCINE 7 YRS/> IM: CPT | Performed by: PHYSICIAN ASSISTANT

## 2023-06-09 PROCEDURE — 6370000000 HC RX 637 (ALT 250 FOR IP): Performed by: PHYSICIAN ASSISTANT

## 2023-06-09 PROCEDURE — 87086 URINE CULTURE/COLONY COUNT: CPT

## 2023-06-09 RX ORDER — ACETAMINOPHEN 325 MG/1
650 TABLET ORAL
Status: COMPLETED | OUTPATIENT
Start: 2023-06-09 | End: 2023-06-09

## 2023-06-09 RX ORDER — GRANULES FOR ORAL 3 G/1
3 POWDER ORAL ONCE
Status: COMPLETED | OUTPATIENT
Start: 2023-06-09 | End: 2023-06-09

## 2023-06-09 RX ADMIN — TETANUS TOXOID, REDUCED DIPHTHERIA TOXOID AND ACELLULAR PERTUSSIS VACCINE, ADSORBED 0.5 ML: 5; 2.5; 8; 8; 2.5 SUSPENSION INTRAMUSCULAR at 15:29

## 2023-06-09 RX ADMIN — ACETAMINOPHEN 650 MG: 325 TABLET ORAL at 15:32

## 2023-06-09 RX ADMIN — FOSFOMYCIN TROMETHAMINE 1 PACKET: 3 GRANULE, FOR SOLUTION ORAL at 16:02

## 2023-06-09 ASSESSMENT — PAIN DESCRIPTION - LOCATION
LOCATION: LEG;SHOULDER
LOCATION: LEG

## 2023-06-09 ASSESSMENT — PAIN DESCRIPTION - PAIN TYPE: TYPE: ACUTE PAIN

## 2023-06-09 ASSESSMENT — LIFESTYLE VARIABLES
HOW MANY STANDARD DRINKS CONTAINING ALCOHOL DO YOU HAVE ON A TYPICAL DAY: PATIENT DOES NOT DRINK
HOW OFTEN DO YOU HAVE A DRINK CONTAINING ALCOHOL: NEVER

## 2023-06-09 ASSESSMENT — PAIN - FUNCTIONAL ASSESSMENT
PAIN_FUNCTIONAL_ASSESSMENT: 0-10
PAIN_FUNCTIONAL_ASSESSMENT: 0-10

## 2023-06-09 ASSESSMENT — PAIN DESCRIPTION - ORIENTATION: ORIENTATION: LEFT

## 2023-06-09 ASSESSMENT — PAIN SCALES - GENERAL
PAINLEVEL_OUTOF10: 6
PAINLEVEL_OUTOF10: 6

## 2023-06-09 NOTE — DISCHARGE INSTRUCTIONS
It was a pleasure taking care of you at [facility] today. We know that when you come to [facility], you are entrusting us with your health, comfort, and safety. Our physicians and nurses honor that trust, and we truly appreciate the opportunity to care for you and your loved ones. We also value your feedback. If you receive a survey about your Emergency Department experience today, please fill it out. We care about our patients' feedback, and we listen to what you have to say. Thank you!

## 2023-06-09 NOTE — CARE COORDINATION
3:08 PM  [address]  has accepted, start of care within 24-48 hours. AVS updated. Notified PA and family that [place] was secured. Recommend for nursing to send supplies if needed for pt to use until [place] visit. Initial note:  CM acknowledges consult to CM for coordination of [place] SN for wound care. CM staffed case with ED PA and met with pt and pt's daughter/family at bedside. Confirmed all demographics which are accurate. Pt and daughter, Elizabeth Robles, reside together. Family reports that one daughter is an LPN and can assist with wound care needs on days when [facility] not [facility]. Offered and discussed freedom of choice, family has elected for referral to be sent to [address] [place] [address] (formerly [facility]). Referral placed via [address] [place] [address],[facility] Floor. Will update pt, family, and AVS once secured.     Amalia Ortiz 178, [facility]  [phone]/Available on [employer]

## 2023-06-09 NOTE — ED PROVIDER NOTES
care. Please call office directly if you do not hear from them in 24-48 hours or with any questions. Bella David MD  [address] [place] [phone]    In 1 week      [facility] EMERGENCY DEPT  [address] [place] [address]  [address] [facility]  [phone]    If symptoms worsen       DISCHARGE MEDICATIONS:     Medication List        ASK your doctor about these medications      acetaminophen 500 MG tablet  Commonly known as: TYLENOL     albuterol sulfate  (90 Base) MCG/ACT inhaler  Commonly known as: PROVENTIL;VENTOLIN;PROAIR     amiodarone 100 MG tablet  Commonly known as: PACERONE     atorvastatin 20 MG tablet  Commonly known as: LIPITOR     bumetanide 1 MG tablet  Commonly known as: BUMEX     Cholecalciferol 50 MCG (2000 UT) Caps     famotidine 20 MG tablet  Commonly known as: PEPCID     Melatonin 5 MG Caps     nitroGLYCERIN 0.4 MG SL tablet  Commonly known as: NITROSTAT     omeprazole 20 MG tablet  Commonly known as: PRILOSEC OTC     ondansetron 4 MG tablet  Commonly known as: ZOFRAN     phenazopyridine 200 MG tablet  Commonly known as: PYRIDIUM     polyethylene glycol 17 GM/SCOOP powder  Commonly known as: GLYCOLAX     potassium chloride 20 MEQ extended release tablet  Commonly known as: KLOR-CON M     rivaroxaban 20 MG Tabs tablet  Commonly known as: XARELTO     senna-docusate 8.6-50 MG per tablet  Commonly known as: PERICOLACE     sulfamethoxazole-trimethoprim 800-160 MG per tablet  Commonly known as: BACTRIM DS;SEPTRA DS                DISCONTINUED MEDICATIONS:  Discharge Medication List as of 6/9/2023  4:06 PM          I have seen and evaluated the patient in conjunction with my supervising physician, Dr. Karen Ovalle. I am the Primary Clinician of Record. ALYSSA Alvarez (electronically signed)    (Please note that parts of this dictation were completed with voice recognition software.  Quite often unanticipated grammatical, syntax, homophones, and other

## 2023-06-09 NOTE — ED NOTES
This RN noticed a R collarbone abnormality. Patient and states it is from an old injury. PA notified. This RN was attempting to give patient tylenol, so the head of bed was elevated. Patient grabbed both handrails to help sit herself up and when she did, she bumped her R forearm on railing and suffered a skin tear. Petroleum based gauze applied, dry gauze applied on top and coband wrapped.      Garth Area, RN  06/09/23 [address] [facility], RN  06/09/23 [id number]

## 2023-06-09 NOTE — ED NOTES
Pt provided with d/c instructions. Time was provided to answer any questions and / or concerns.   Pt had no concerns at time of d/c.     Jorge Gibbs RN  06/09/23 3621

## 2023-06-11 LAB
BACTERIA SPEC CULT: ABNORMAL
CC UR VC: ABNORMAL
SERVICE CMNT-IMP: ABNORMAL

## 2024-01-01 ENCOUNTER — HOME CARE VISIT (OUTPATIENT)
Age: 89
End: 2024-01-01
Payer: MEDICARE

## 2024-01-01 ENCOUNTER — HOME CARE VISIT (OUTPATIENT)
Facility: HOME HEALTH | Age: 89
End: 2024-01-01
Payer: MEDICARE

## 2024-01-01 VITALS — RESPIRATION RATE: 18 BRPM | HEART RATE: 96 BPM | TEMPERATURE: 97.6 F

## 2024-01-01 VITALS — DIASTOLIC BLOOD PRESSURE: 60 MMHG | SYSTOLIC BLOOD PRESSURE: 82 MMHG | HEART RATE: 58 BPM | RESPIRATION RATE: 18 BRPM

## 2024-01-01 VITALS — HEART RATE: 73 BPM | DIASTOLIC BLOOD PRESSURE: 49 MMHG | SYSTOLIC BLOOD PRESSURE: 87 MMHG | RESPIRATION RATE: 20 BRPM

## 2024-01-01 VITALS — TEMPERATURE: 97.3 F | RESPIRATION RATE: 20 BRPM | HEART RATE: 92 BPM

## 2024-01-01 VITALS — HEART RATE: 88 BPM | TEMPERATURE: 97.7 F | RESPIRATION RATE: 22 BRPM

## 2024-01-01 VITALS — HEART RATE: 74 BPM | RESPIRATION RATE: 20 BRPM

## 2024-01-01 VITALS — RESPIRATION RATE: 28 BRPM

## 2024-01-01 PROCEDURE — G0300 HHS/HOSPICE OF LPN EA 15 MIN: HCPCS

## 2024-01-01 PROCEDURE — G0156 HHCP-SVS OF AIDE,EA 15 MIN: HCPCS

## 2024-01-01 PROCEDURE — G0299 HHS/HOSPICE OF RN EA 15 MIN: HCPCS

## 2024-01-01 PROCEDURE — G0155 HHCP-SVS OF CSW,EA 15 MIN: HCPCS

## 2024-01-01 RX ADMIN — HYDROMORPHONE HYDROCHLORIDE 1 MG: 1 SOLUTION ORAL at 11:30

## 2024-01-01 ASSESSMENT — ENCOUNTER SYMPTOMS
BLOOD IN STOOL: 1
STOOL DESCRIPTION: BLOOD TINGED
BOWEL INCONTINENCE: 1
RECTAL BLEEDING: 1
BOWEL INCONTINENCE: 1
STOOL DESCRIPTION: SOFT
STOOL DESCRIPTION: LOOSE
BOWEL INCONTINENCE: 1

## 2024-03-13 ENCOUNTER — HOSPITAL ENCOUNTER (INPATIENT)
Facility: HOSPITAL | Age: 89
LOS: 5 days | Discharge: HOSPICE | DRG: 065 | End: 2024-03-19
Attending: STUDENT IN AN ORGANIZED HEALTH CARE EDUCATION/TRAINING PROGRAM | Admitting: INTERNAL MEDICINE
Payer: MEDICARE

## 2024-03-13 ENCOUNTER — APPOINTMENT (OUTPATIENT)
Facility: HOSPITAL | Age: 89
DRG: 065 | End: 2024-03-13
Payer: MEDICARE

## 2024-03-13 DIAGNOSIS — I63.9 CEREBROVASCULAR ACCIDENT (CVA), UNSPECIFIED MECHANISM (HCC): Primary | ICD-10-CM

## 2024-03-13 LAB
ALBUMIN SERPL-MCNC: 2.7 G/DL (ref 3.5–5)
ALBUMIN/GLOB SERPL: 0.6 (ref 1.1–2.2)
ALP SERPL-CCNC: 104 U/L (ref 45–117)
ALT SERPL-CCNC: 9 U/L (ref 12–78)
ANION GAP SERPL CALC-SCNC: 5 MMOL/L (ref 5–15)
APPEARANCE UR: CLEAR
AST SERPL-CCNC: 18 U/L (ref 15–37)
BACTERIA URNS QL MICRO: NEGATIVE /HPF
BASOPHILS # BLD: 0.1 K/UL (ref 0–0.1)
BASOPHILS NFR BLD: 1 % (ref 0–1)
BILIRUB SERPL-MCNC: 0.7 MG/DL (ref 0.2–1)
BILIRUB UR QL: NEGATIVE
BUN SERPL-MCNC: 18 MG/DL (ref 6–20)
BUN/CREAT SERPL: 14 (ref 12–20)
CALCIUM SERPL-MCNC: 9.3 MG/DL (ref 8.5–10.1)
CHLORIDE SERPL-SCNC: 105 MMOL/L (ref 97–108)
CO2 SERPL-SCNC: 28 MMOL/L (ref 21–32)
COLOR UR: ABNORMAL
CREAT SERPL-MCNC: 1.26 MG/DL (ref 0.55–1.02)
DIFFERENTIAL METHOD BLD: ABNORMAL
EOSINOPHIL # BLD: 0.2 K/UL (ref 0–0.4)
EOSINOPHIL NFR BLD: 2 % (ref 0–7)
EPITH CASTS URNS QL MICRO: ABNORMAL /LPF
ERYTHROCYTE [DISTWIDTH] IN BLOOD BY AUTOMATED COUNT: 13 % (ref 11.5–14.5)
GLOBULIN SER CALC-MCNC: 4.5 G/DL (ref 2–4)
GLUCOSE BLD STRIP.AUTO-MCNC: 116 MG/DL (ref 65–117)
GLUCOSE SERPL-MCNC: 108 MG/DL (ref 65–100)
GLUCOSE UR STRIP.AUTO-MCNC: NEGATIVE MG/DL
HCT VFR BLD AUTO: 39.9 % (ref 35–47)
HGB BLD-MCNC: 12.6 G/DL (ref 11.5–16)
HGB UR QL STRIP: NEGATIVE
HYALINE CASTS URNS QL MICRO: ABNORMAL /LPF (ref 0–2)
IMM GRANULOCYTES # BLD AUTO: 0.1 K/UL (ref 0–0.04)
IMM GRANULOCYTES NFR BLD AUTO: 1 % (ref 0–0.5)
INR PPP: 1.1 (ref 0.9–1.1)
KETONES UR QL STRIP.AUTO: NEGATIVE MG/DL
LEUKOCYTE ESTERASE UR QL STRIP.AUTO: NEGATIVE
LYMPHOCYTES # BLD: 1.3 K/UL (ref 0.8–3.5)
LYMPHOCYTES NFR BLD: 15 % (ref 12–49)
MCH RBC QN AUTO: 30.4 PG (ref 26–34)
MCHC RBC AUTO-ENTMCNC: 31.6 G/DL (ref 30–36.5)
MCV RBC AUTO: 96.4 FL (ref 80–99)
MONOCYTES # BLD: 1.1 K/UL (ref 0–1)
MONOCYTES NFR BLD: 13 % (ref 5–13)
NEUTS SEG # BLD: 6 K/UL (ref 1.8–8)
NEUTS SEG NFR BLD: 68 % (ref 32–75)
NITRITE UR QL STRIP.AUTO: NEGATIVE
NRBC # BLD: 0 K/UL (ref 0–0.01)
NRBC BLD-RTO: 0 PER 100 WBC
PH UR STRIP: 7.5 (ref 5–8)
PLATELET # BLD AUTO: 162 K/UL (ref 150–400)
POTASSIUM SERPL-SCNC: 3.9 MMOL/L (ref 3.5–5.1)
PROT SERPL-MCNC: 7.2 G/DL (ref 6.4–8.2)
PROT UR STRIP-MCNC: ABNORMAL MG/DL
PROTHROMBIN TIME: 11.4 SEC (ref 9–11.1)
RBC # BLD AUTO: 4.14 M/UL (ref 3.8–5.2)
RBC #/AREA URNS HPF: ABNORMAL /HPF (ref 0–5)
RBC MORPH BLD: ABNORMAL
SERVICE CMNT-IMP: NORMAL
SODIUM SERPL-SCNC: 138 MMOL/L (ref 136–145)
SP GR UR REFRACTOMETRY: 1.01 (ref 1–1.03)
TROPONIN I SERPL HS-MCNC: 9 NG/L (ref 0–51)
URINE CULTURE IF INDICATED: ABNORMAL
UROBILINOGEN UR QL STRIP.AUTO: 0.2 EU/DL (ref 0.2–1)
WBC # BLD AUTO: 8.8 K/UL (ref 3.6–11)
WBC URNS QL MICRO: ABNORMAL /HPF (ref 0–4)

## 2024-03-13 PROCEDURE — 99285 EMERGENCY DEPT VISIT HI MDM: CPT

## 2024-03-13 PROCEDURE — 82962 GLUCOSE BLOOD TEST: CPT

## 2024-03-13 PROCEDURE — 70498 CT ANGIOGRAPHY NECK: CPT

## 2024-03-13 PROCEDURE — 6360000004 HC RX CONTRAST MEDICATION: Performed by: STUDENT IN AN ORGANIZED HEALTH CARE EDUCATION/TRAINING PROGRAM

## 2024-03-13 PROCEDURE — 70450 CT HEAD/BRAIN W/O DYE: CPT

## 2024-03-13 PROCEDURE — 4A03X5D MEASUREMENT OF ARTERIAL FLOW, INTRACRANIAL, EXTERNAL APPROACH: ICD-10-PCS | Performed by: RADIOLOGY

## 2024-03-13 PROCEDURE — 84484 ASSAY OF TROPONIN QUANT: CPT

## 2024-03-13 PROCEDURE — 80053 COMPREHEN METABOLIC PANEL: CPT

## 2024-03-13 PROCEDURE — 71045 X-RAY EXAM CHEST 1 VIEW: CPT

## 2024-03-13 PROCEDURE — 93005 ELECTROCARDIOGRAM TRACING: CPT | Performed by: STUDENT IN AN ORGANIZED HEALTH CARE EDUCATION/TRAINING PROGRAM

## 2024-03-13 PROCEDURE — 85610 PROTHROMBIN TIME: CPT

## 2024-03-13 PROCEDURE — 0042T CT BRAIN PERFUSION: CPT

## 2024-03-13 PROCEDURE — 51701 INSERT BLADDER CATHETER: CPT

## 2024-03-13 PROCEDURE — 81001 URINALYSIS AUTO W/SCOPE: CPT

## 2024-03-13 PROCEDURE — 85025 COMPLETE CBC W/AUTO DIFF WBC: CPT

## 2024-03-13 PROCEDURE — 36415 COLL VENOUS BLD VENIPUNCTURE: CPT

## 2024-03-13 RX ORDER — ONDANSETRON 2 MG/ML
4 INJECTION INTRAMUSCULAR; INTRAVENOUS EVERY 6 HOURS PRN
Status: DISCONTINUED | OUTPATIENT
Start: 2024-03-13 | End: 2024-03-18

## 2024-03-13 RX ORDER — ACETAMINOPHEN 325 MG/1
650 TABLET ORAL EVERY 4 HOURS PRN
Status: DISCONTINUED | OUTPATIENT
Start: 2024-03-13 | End: 2024-03-18

## 2024-03-13 RX ADMIN — IOPAMIDOL 100 ML: 755 INJECTION, SOLUTION INTRAVENOUS at 16:42

## 2024-03-13 ASSESSMENT — PAIN DESCRIPTION - LOCATION: LOCATION: BACK

## 2024-03-13 ASSESSMENT — PAIN - FUNCTIONAL ASSESSMENT: PAIN_FUNCTIONAL_ASSESSMENT: 0-10

## 2024-03-13 ASSESSMENT — PAIN SCALES - GENERAL: PAINLEVEL_OUTOF10: 5

## 2024-03-14 ENCOUNTER — APPOINTMENT (OUTPATIENT)
Facility: HOSPITAL | Age: 89
DRG: 065 | End: 2024-03-14
Payer: MEDICARE

## 2024-03-14 PROBLEM — I63.9 STROKE OF UNKNOWN CAUSE (HCC): Status: ACTIVE | Noted: 2024-03-14

## 2024-03-14 PROBLEM — I63.9 ACUTE ISCHEMIC STROKE (HCC): Status: ACTIVE | Noted: 2024-03-14

## 2024-03-14 LAB
ANION GAP SERPL CALC-SCNC: 2 MMOL/L (ref 5–15)
BUN SERPL-MCNC: 25 MG/DL (ref 6–20)
BUN/CREAT SERPL: 22 (ref 12–20)
CALCIUM SERPL-MCNC: 8.8 MG/DL (ref 8.5–10.1)
CHLORIDE SERPL-SCNC: 106 MMOL/L (ref 97–108)
CHOLEST SERPL-MCNC: 155 MG/DL
CO2 SERPL-SCNC: 29 MMOL/L (ref 21–32)
CREAT SERPL-MCNC: 1.13 MG/DL (ref 0.55–1.02)
ECHO AV MEAN GRADIENT: 3 MMHG
ECHO AV MEAN VELOCITY: 0.8 M/S
ECHO AV PEAK GRADIENT: 6 MMHG
ECHO AV PEAK VELOCITY: 1.2 M/S
ECHO AV VTI: 22.3 CM
ECHO BSA: 2.13 M2
ECHO LA DIAMETER INDEX: 2.6 CM/M2
ECHO LA DIAMETER: 5.4 CM
ECHO LA VOL A-L A2C: 89 ML (ref 22–52)
ECHO LA VOL A-L A4C: 64 ML (ref 22–52)
ECHO LA VOL MOD A2C: 85 ML (ref 22–52)
ECHO LA VOL MOD A4C: 60 ML (ref 22–52)
ECHO LA VOLUME AREA LENGTH: 77 ML
ECHO LA VOLUME INDEX A-L A2C: 43 ML/M2 (ref 16–34)
ECHO LA VOLUME INDEX A-L A4C: 31 ML/M2 (ref 16–34)
ECHO LA VOLUME INDEX AREA LENGTH: 37 ML/M2 (ref 16–34)
ECHO LA VOLUME INDEX MOD A2C: 41 ML/M2 (ref 16–34)
ECHO LA VOLUME INDEX MOD A4C: 29 ML/M2 (ref 16–34)
ECHO LV EDV A4C: 39 ML
ECHO LV EDV INDEX A4C: 19 ML/M2
ECHO LV EJECTION FRACTION A4C: 71 %
ECHO LV ESV A4C: 11 ML
ECHO LV ESV INDEX A4C: 5 ML/M2
ECHO LV FRACTIONAL SHORTENING: 23 % (ref 28–44)
ECHO LV INTERNAL DIMENSION DIASTOLE INDEX: 1.92 CM/M2
ECHO LV INTERNAL DIMENSION DIASTOLIC: 4 CM (ref 3.9–5.3)
ECHO LV INTERNAL DIMENSION SYSTOLIC INDEX: 1.49 CM/M2
ECHO LV INTERNAL DIMENSION SYSTOLIC: 3.1 CM
ECHO LV IVSD: 1.4 CM (ref 0.6–0.9)
ECHO LV MASS 2D: 175.8 G (ref 67–162)
ECHO LV MASS INDEX 2D: 84.5 G/M2 (ref 43–95)
ECHO LV POSTERIOR WALL DIASTOLIC: 1.1 CM (ref 0.6–0.9)
ECHO LV RELATIVE WALL THICKNESS RATIO: 0.55
ECHO LVOT AREA: 3.5 CM2
ECHO LVOT DIAM: 2.1 CM
ECHO MV MAX VELOCITY: 1.2 M/S
ECHO MV MEAN GRADIENT: 2 MMHG
ECHO MV MEAN VELOCITY: 0.6 M/S
ECHO MV PEAK GRADIENT: 5 MMHG
ECHO MV VTI: 20.4 CM
ECHO TV REGURGITANT MAX VELOCITY: 3.01 M/S
ECHO TV REGURGITANT PEAK GRADIENT: 36 MMHG
EKG ATRIAL RATE: 104 BPM
EKG DIAGNOSIS: NORMAL
EKG Q-T INTERVAL: 374 MS
EKG QRS DURATION: 92 MS
EKG QTC CALCULATION (BAZETT): 460 MS
EKG R AXIS: 29 DEGREES
EKG T AXIS: 49 DEGREES
EKG VENTRICULAR RATE: 91 BPM
ERYTHROCYTE [DISTWIDTH] IN BLOOD BY AUTOMATED COUNT: 13.2 % (ref 11.5–14.5)
EST. AVERAGE GLUCOSE BLD GHB EST-MCNC: 100 MG/DL
GLUCOSE SERPL-MCNC: 101 MG/DL (ref 65–100)
HBA1C MFR BLD: 5.1 % (ref 4–5.6)
HCT VFR BLD AUTO: 38.8 % (ref 35–47)
HDLC SERPL-MCNC: 35 MG/DL
HDLC SERPL: 4.4 (ref 0–5)
HGB BLD-MCNC: 12.1 G/DL (ref 11.5–16)
LDLC SERPL CALC-MCNC: 101.4 MG/DL (ref 0–100)
MAGNESIUM SERPL-MCNC: 2.4 MG/DL (ref 1.6–2.4)
MCH RBC QN AUTO: 30.1 PG (ref 26–34)
MCHC RBC AUTO-ENTMCNC: 31.2 G/DL (ref 30–36.5)
MCV RBC AUTO: 96.5 FL (ref 80–99)
NRBC # BLD: 0 K/UL (ref 0–0.01)
NRBC BLD-RTO: 0 PER 100 WBC
PLATELET # BLD AUTO: 266 K/UL (ref 150–400)
PMV BLD AUTO: 10.3 FL (ref 8.9–12.9)
POTASSIUM SERPL-SCNC: 4.4 MMOL/L (ref 3.5–5.1)
RBC # BLD AUTO: 4.02 M/UL (ref 3.8–5.2)
SODIUM SERPL-SCNC: 137 MMOL/L (ref 136–145)
TRIGL SERPL-MCNC: 93 MG/DL
VLDLC SERPL CALC-MCNC: 18.6 MG/DL
WBC # BLD AUTO: 10.6 K/UL (ref 3.6–11)

## 2024-03-14 PROCEDURE — 83735 ASSAY OF MAGNESIUM: CPT

## 2024-03-14 PROCEDURE — 92610 EVALUATE SWALLOWING FUNCTION: CPT | Performed by: SPEECH-LANGUAGE PATHOLOGIST

## 2024-03-14 PROCEDURE — 70551 MRI BRAIN STEM W/O DYE: CPT

## 2024-03-14 PROCEDURE — 2580000003 HC RX 258: Performed by: NURSE PRACTITIONER

## 2024-03-14 PROCEDURE — 93308 TTE F-UP OR LMTD: CPT

## 2024-03-14 PROCEDURE — 83036 HEMOGLOBIN GLYCOSYLATED A1C: CPT

## 2024-03-14 PROCEDURE — 99222 1ST HOSP IP/OBS MODERATE 55: CPT | Performed by: PSYCHIATRY & NEUROLOGY

## 2024-03-14 PROCEDURE — 85027 COMPLETE CBC AUTOMATED: CPT

## 2024-03-14 PROCEDURE — 6370000000 HC RX 637 (ALT 250 FOR IP): Performed by: NURSE PRACTITIONER

## 2024-03-14 PROCEDURE — 80048 BASIC METABOLIC PNL TOTAL CA: CPT

## 2024-03-14 PROCEDURE — 1100000003 HC PRIVATE W/ TELEMETRY

## 2024-03-14 PROCEDURE — 36415 COLL VENOUS BLD VENIPUNCTURE: CPT

## 2024-03-14 PROCEDURE — 80061 LIPID PANEL: CPT

## 2024-03-14 RX ORDER — SODIUM CHLORIDE 9 MG/ML
INJECTION, SOLUTION INTRAVENOUS CONTINUOUS
Status: ACTIVE | OUTPATIENT
Start: 2024-03-14 | End: 2024-03-14

## 2024-03-14 RX ORDER — POLYETHYLENE GLYCOL 3350 17 G/17G
17 POWDER, FOR SOLUTION ORAL DAILY PRN
Status: DISCONTINUED | OUTPATIENT
Start: 2024-03-14 | End: 2024-03-19 | Stop reason: HOSPADM

## 2024-03-14 RX ORDER — ASPIRIN 325 MG
325 TABLET ORAL ONCE
Status: DISCONTINUED | OUTPATIENT
Start: 2024-03-14 | End: 2024-03-14

## 2024-03-14 RX ORDER — SODIUM CHLORIDE 0.9 % (FLUSH) 0.9 %
5-40 SYRINGE (ML) INJECTION EVERY 12 HOURS SCHEDULED
Status: DISCONTINUED | OUTPATIENT
Start: 2024-03-14 | End: 2024-03-19 | Stop reason: HOSPADM

## 2024-03-14 RX ORDER — CASTOR OIL AND BALSAM, PERU 788; 87 MG/G; MG/G
OINTMENT TOPICAL 2 TIMES DAILY
Status: DISCONTINUED | OUTPATIENT
Start: 2024-03-14 | End: 2024-03-14 | Stop reason: ALTCHOICE

## 2024-03-14 RX ORDER — ASPIRIN 81 MG/1
81 TABLET, CHEWABLE ORAL DAILY
Status: DISCONTINUED | OUTPATIENT
Start: 2024-03-14 | End: 2024-03-19 | Stop reason: HOSPADM

## 2024-03-14 RX ORDER — ACETAMINOPHEN 325 MG/1
650 TABLET ORAL EVERY 4 HOURS PRN
Status: DISCONTINUED | OUTPATIENT
Start: 2024-03-14 | End: 2024-03-19 | Stop reason: HOSPADM

## 2024-03-14 RX ORDER — ONDANSETRON 4 MG/1
4 TABLET, ORALLY DISINTEGRATING ORAL EVERY 8 HOURS PRN
Status: DISCONTINUED | OUTPATIENT
Start: 2024-03-14 | End: 2024-03-19 | Stop reason: HOSPADM

## 2024-03-14 RX ORDER — NEOMYCIN SULFATE, POLYMYXIN B SULFATE, AND DEXAMETHASONE 3.5; 10000; 1 MG/G; [USP'U]/G; MG/G
OINTMENT OPHTHALMIC 3 TIMES DAILY
Status: DISPENSED | OUTPATIENT
Start: 2024-03-14 | End: 2024-03-18

## 2024-03-14 RX ORDER — HYDRALAZINE HYDROCHLORIDE 20 MG/ML
10 INJECTION INTRAMUSCULAR; INTRAVENOUS EVERY 6 HOURS PRN
Status: DISCONTINUED | OUTPATIENT
Start: 2024-03-14 | End: 2024-03-19 | Stop reason: HOSPADM

## 2024-03-14 RX ORDER — BUMETANIDE 1 MG/1
1 TABLET ORAL DAILY
Status: DISCONTINUED | OUTPATIENT
Start: 2024-03-14 | End: 2024-03-19 | Stop reason: HOSPADM

## 2024-03-14 RX ORDER — POTASSIUM CHLORIDE 20 MEQ/1
20 TABLET, EXTENDED RELEASE ORAL DAILY
Status: DISCONTINUED | OUTPATIENT
Start: 2024-03-14 | End: 2024-03-19 | Stop reason: HOSPADM

## 2024-03-14 RX ORDER — ACETAMINOPHEN 650 MG/1
650 SUPPOSITORY RECTAL EVERY 4 HOURS PRN
Status: DISCONTINUED | OUTPATIENT
Start: 2024-03-14 | End: 2024-03-19 | Stop reason: HOSPADM

## 2024-03-14 RX ORDER — ONDANSETRON 2 MG/ML
4 INJECTION INTRAMUSCULAR; INTRAVENOUS EVERY 6 HOURS PRN
Status: DISCONTINUED | OUTPATIENT
Start: 2024-03-14 | End: 2024-03-19 | Stop reason: HOSPADM

## 2024-03-14 RX ORDER — ASPIRIN 300 MG/1
300 SUPPOSITORY RECTAL DAILY
Status: DISCONTINUED | OUTPATIENT
Start: 2024-03-14 | End: 2024-03-19 | Stop reason: HOSPADM

## 2024-03-14 RX ORDER — SODIUM CHLORIDE 9 MG/ML
INJECTION, SOLUTION INTRAVENOUS PRN
Status: DISCONTINUED | OUTPATIENT
Start: 2024-03-14 | End: 2024-03-19 | Stop reason: HOSPADM

## 2024-03-14 RX ORDER — LABETALOL HYDROCHLORIDE 5 MG/ML
10 INJECTION INTRAVENOUS EVERY 4 HOURS PRN
Status: DISCONTINUED | OUTPATIENT
Start: 2024-03-14 | End: 2024-03-19 | Stop reason: HOSPADM

## 2024-03-14 RX ORDER — ATORVASTATIN CALCIUM 40 MG/1
40 TABLET, FILM COATED ORAL NIGHTLY
Status: DISCONTINUED | OUTPATIENT
Start: 2024-03-14 | End: 2024-03-19 | Stop reason: HOSPADM

## 2024-03-14 RX ORDER — ASPIRIN 300 MG/1
300 SUPPOSITORY RECTAL ONCE
Status: COMPLETED | OUTPATIENT
Start: 2024-03-14 | End: 2024-03-14

## 2024-03-14 RX ORDER — SODIUM CHLORIDE 0.9 % (FLUSH) 0.9 %
5-40 SYRINGE (ML) INJECTION PRN
Status: DISCONTINUED | OUTPATIENT
Start: 2024-03-14 | End: 2024-03-19 | Stop reason: HOSPADM

## 2024-03-14 RX ADMIN — NEOMYCIN SULFATE, POLYMYXIN B SULFATE, AND DEXAMETHASONE: 3.5; 10000; 1 OINTMENT OPHTHALMIC at 10:44

## 2024-03-14 RX ADMIN — ACETAMINOPHEN 650 MG: 650 SUPPOSITORY RECTAL at 10:48

## 2024-03-14 RX ADMIN — SODIUM CHLORIDE, PRESERVATIVE FREE 10 ML: 5 INJECTION INTRAVENOUS at 10:51

## 2024-03-14 RX ADMIN — ASPIRIN 300 MG: 300 SUPPOSITORY RECTAL at 02:45

## 2024-03-14 RX ADMIN — SODIUM CHLORIDE: 9 INJECTION, SOLUTION INTRAVENOUS at 01:08

## 2024-03-14 RX ADMIN — NEOMYCIN SULFATE, POLYMYXIN B SULFATE, AND DEXAMETHASONE: 3.5; 10000; 1 OINTMENT OPHTHALMIC at 13:55

## 2024-03-14 RX ADMIN — NEOMYCIN SULFATE, POLYMYXIN B SULFATE, AND DEXAMETHASONE: 3.5; 10000; 1 OINTMENT OPHTHALMIC at 21:03

## 2024-03-14 RX ADMIN — ASPIRIN 300 MG: 300 SUPPOSITORY RECTAL at 10:48

## 2024-03-14 ASSESSMENT — PAIN SCALES - GENERAL
PAINLEVEL_OUTOF10: 0
PAINLEVEL_OUTOF10: 3

## 2024-03-14 ASSESSMENT — PAIN DESCRIPTION - LOCATION: LOCATION: GENERALIZED

## 2024-03-14 NOTE — ED NOTES
Report received from DAKSHA Aragon. Reviewed reason for patient arrival, vitals, labs, medications, orders, procedures, results, pending orders/results and current plan for disposition. Questions were asked and answered prior to departure.

## 2024-03-14 NOTE — ED NOTES
Patient failed swallow test, patient's family given a sponge on a stick for her to dip in water and wet the patient's mouth, family educated on not giving the patient anything to drink.

## 2024-03-14 NOTE — ED NOTES
Edgard Jacobson   Patient:  REJI PATTERSON   YOB: 1929  MRN:  [id number]  Location: ED    ER16-16  3/14/24 1:18 AM   [phone] Hospital or Facility: [facility] From: Kathy Kaye RE: REJI PATTERSON 12/27/1929 RM: ER16-16 pt failed the swallow screen.. can you put the order for aspirin suppository? Need Callback: NO CALLBACK REQ ER FY

## 2024-03-14 NOTE — CONSULTS
Tele-neuro called @4:12p Level 1 Code S for Dr. Bishop  
[facility]  [address] [place] [address]    Neurology Consultation    Date: March 14, 2024    Laura Akins  [id number]  12/27/1929  [address] [address]    Primary Care Physician:  Elmer Cobb MD  [unfilled]  [phone]    Referring Physician:  Yulissa Brooks MD    Impression: This is a patient with a history of stroke and residual left-sided weakness who we are asked to see for left facial droop.  She is quite sleepy and does not cooperate with the examination so my assessment is somewhat limited in this regard.  MRI of the brain will be helpful.  It appears she is already on an anticoagulant and low-dose aspirin so if there is evidence of a stroke in the face of her history of atrial fibrillation I do not know what we would do beyond continuing those medications.  She is also on a moderate dose of statin at 40 mg of atorvastatin a day.  I would recommend looking for other nonneurologic causes of her functional decline particularly if the MRI of the brain is unremarkable.    Plan and Recommendations: MRI of the brain as ordered.  If the study is abnormal please contact me.  At this point I will sign off pending further communication from the attending physician.    Barrier to Discharge from Neurologic Perspective: MRI of the brain.    Outpatient Neurology Follow Up: Not anticipated.      Flaquito Greene., M.D.      Diplomate, American Board of Neurology and Psychiatry  Diplomate, American Board of Electrodiagnostic Medicine  Subspecialty Certification, Headache Medicine, [facility]  ______________________________________________________________________    Reason for Consultation: Laura Akins is a 89 y.o. y/o female who we are asked to see in consultation for possible stroke.    History of Present Illness: This a patient with a history of a prior stroke and significant residual left-sided weakness.  This felt like her left 
  Elmer Cobb MD   Document generated by: Mary Enriquez 11/09/2023      ----------------------------------------------------------------------------------------------------------------------------------------------------------------------  [address], [place] [address] P: [phone] F: [phone] [address] [place] [address] P [phone] F: [phone]   [address], [place] [address] P: [phone] F: [phone] [address] [facility] [address] [place] [address] P: [phone] F: [phone]   [address], [place] [address] P: [phone] F: [phone] [address], [place] [address] P: [phone] F: [phone]   [address], [place] [address] P: [phone] F: [phone] [address] [place] [address] P: [phone] F: [phone]     [address] [facility] [address], [place] [address] P: [phone] F: [phone]

## 2024-03-14 NOTE — ED PROVIDER NOTES
LIPITOR     bumetanide 1 MG tablet  Commonly known as: BUMEX     famotidine 20 MG tablet  Commonly known as: PEPCID     Melatonin 5 MG Caps     nitroGLYCERIN 0.4 MG SL tablet  Commonly known as: NITROSTAT     omeprazole 20 MG tablet  Commonly known as: PRILOSEC OTC     ondansetron 4 MG tablet  Commonly known as: ZOFRAN     phenazopyridine 200 MG tablet  Commonly known as: PYRIDIUM     polyethylene glycol 17 GM/SCOOP powder  Commonly known as: GLYCOLAX     potassium chloride 20 MEQ extended release tablet  Commonly known as: KLOR-CON M     rivaroxaban 20 MG Tabs tablet  Commonly known as: XARELTO     senna-docusate 8.6-50 MG per tablet  Commonly known as: PERICOLACE     sulfamethoxazole-trimethoprim 800-160 MG per tablet  Commonly known as: BACTRIM DS;SEPTRA DS     vitamin D 50 MCG (2000 UT) Caps capsule  Commonly known as: CHOLECALCIFEROL                DISCONTINUED MEDICATIONS:  Current Discharge Medication List          I am the Primary Clinician of Record.   Palmira Bishop DO (electronically signed)      (Please note that parts of this dictation were completed with voice recognition software. Quite often unanticipated grammatical, syntax, homophones, and other interpretive errors are inadvertently transcribed by the computer software. Please disregards these errors. Please excuse any errors that have escaped final proofreading.)          Palmira Bishop DO  Resident  03/15/24 0421

## 2024-03-14 NOTE — H&P
(LIPITOR) 20 MG tablet Take 2 tablets by mouth nightly    Automatic Reconciliation, Ar   famotidine (PEPCID) 20 MG tablet Take 1 tablet by mouth 2 times daily    Automatic Reconciliation, Ar   Melatonin 5 MG CAPS Take 5 mg by mouth nightly    Automatic Reconciliation, Ar   nitroGLYCERIN (NITROSTAT) 0.4 MG SL tablet Place 1 tablet under the tongue    Automatic Reconciliation, Ar   omeprazole (PRILOSEC OTC) 20 MG tablet Take 1 tablet by mouth daily    Automatic Reconciliation, Ar   ondansetron (ZOFRAN) 4 MG tablet Take 1 tablet by mouth every 6 hours as needed    Automatic Reconciliation, Ar   phenazopyridine (PYRIDIUM) 200 MG tablet Take 1 tablet by mouth daily    Automatic Reconciliation, Ar   senna-docusate (PERICOLACE) 8.6-50 MG per tablet Take 2 tablets by mouth daily as needed    Automatic Reconciliation, Ar   sulfamethoxazole-trimethoprim (BACTRIM DS;SEPTRA DS) 800-160 MG per tablet Take 1 tablet by mouth 2 times daily    Automatic Reconciliation, Ar         Objective:   VITALS:    Patient Vitals for the past 24 hrs:   BP Temp Temp src Pulse Resp SpO2 Weight   24 2300 -- -- -- 87 22 -- --   24 2200 -- -- -- 84 23 -- --   24 2100 -- -- -- 87 23 -- --   24 -- -- -- 85 19 -- --   24 1930 (!) 184/116 -- -- 95 18 -- --   24 1915 (!) 194/147 -- -- 83 24 -- --   24 1900 (!) 179/78 -- -- 82 22 -- --   24 1845 (!) 174/73 -- -- 83 19 -- --   24 1830 (!) 160/75 -- -- 82 20 -- --   24 1815 (!) 150/87 -- -- 86 20 -- --   24 1800 135/79 -- -- 89 20 -- --   24 1745 136/82 -- -- 87 16 -- --   24 1730 (!) 169/90 -- -- 93 16 -- --   24 1715 137/86 -- -- 89 21 -- --   24 1704 -- -- -- 100 25 -- --   24 1701 (!) 151/94 -- -- -- -- -- --   24 1631 (!) 218/173 98.2 °F (36.8 °C) Oral 94 16 98 % 94.5 kg (208 lb 5.4 oz)       Temp (24hrs), Av.2 °F (36.8 °C), Min:98.2 °F (36.8 °C), Max:98.2 °F (36.8 °C)             Wt Readings

## 2024-03-14 NOTE — ED NOTES
Report given to DAKSHA Escalante. Nurse was informed of reason for arrival, vitals, labs, medications, orders, procedures, results, anything left pending and current plan of action. Questions were asked and received prior to departure from the patient.

## 2024-03-14 NOTE — WOUND CARE
Wound care consult for the sacrum skin issues that are present on admission.  Patient's daughter present for the exam and explanation for prevention. Pt. Has had a stroke.  She is usually not hard of hearing and she is usually \"very with it and comical and interactive\".    Assessment: Very Somnolent patient without much interaction now.  She only groaned with movement.   Turned to her right side and the sacrum has significant scarring across the middle with about 4 cm of linear scarring.  The rest of the skin around it all blanches and is pink.     Treatment Recommended:  Keep patient off loaded from the Sacrum by turning her on her sides only.  Float the heels at all times.  Avoid diaper use unless patient is up in the chair or walking around.   Plan: Pressure injury prevention.  Pt. Does not need Venelex. She does need continence care. Use Zinc oxide cream on the skin to keep the skin dry.   Emeli Briones RN, BSN, CWON

## 2024-03-15 PROBLEM — I63.9 ISCHEMIC STROKE (HCC): Status: ACTIVE | Noted: 2024-03-15

## 2024-03-15 PROBLEM — Z71.89 DNR (DO NOT RESUSCITATE) DISCUSSION: Status: ACTIVE | Noted: 2024-03-15

## 2024-03-15 LAB
ANION GAP SERPL CALC-SCNC: 5 MMOL/L (ref 5–15)
BUN SERPL-MCNC: 19 MG/DL (ref 6–20)
BUN/CREAT SERPL: 17 (ref 12–20)
CALCIUM SERPL-MCNC: 9.3 MG/DL (ref 8.5–10.1)
CHLORIDE SERPL-SCNC: 113 MMOL/L (ref 97–108)
CO2 SERPL-SCNC: 25 MMOL/L (ref 21–32)
CREAT SERPL-MCNC: 1.11 MG/DL (ref 0.55–1.02)
ERYTHROCYTE [DISTWIDTH] IN BLOOD BY AUTOMATED COUNT: 13.4 % (ref 11.5–14.5)
GLUCOSE SERPL-MCNC: 93 MG/DL (ref 65–100)
HCT VFR BLD AUTO: 37.4 % (ref 35–47)
HGB BLD-MCNC: 11.5 G/DL (ref 11.5–16)
MCH RBC QN AUTO: 30.3 PG (ref 26–34)
MCHC RBC AUTO-ENTMCNC: 30.7 G/DL (ref 30–36.5)
MCV RBC AUTO: 98.7 FL (ref 80–99)
NRBC # BLD: 0 K/UL (ref 0–0.01)
NRBC BLD-RTO: 0 PER 100 WBC
PLATELET # BLD AUTO: 233 K/UL (ref 150–400)
PMV BLD AUTO: 10.5 FL (ref 8.9–12.9)
POTASSIUM SERPL-SCNC: 4 MMOL/L (ref 3.5–5.1)
RBC # BLD AUTO: 3.79 M/UL (ref 3.8–5.2)
SODIUM SERPL-SCNC: 143 MMOL/L (ref 136–145)
WBC # BLD AUTO: 10.4 K/UL (ref 3.6–11)

## 2024-03-15 PROCEDURE — 36415 COLL VENOUS BLD VENIPUNCTURE: CPT

## 2024-03-15 PROCEDURE — 2580000003 HC RX 258: Performed by: NURSE PRACTITIONER

## 2024-03-15 PROCEDURE — 6370000000 HC RX 637 (ALT 250 FOR IP): Performed by: NURSE PRACTITIONER

## 2024-03-15 PROCEDURE — 92522 EVALUATE SPEECH PRODUCTION: CPT

## 2024-03-15 PROCEDURE — 97165 OT EVAL LOW COMPLEX 30 MIN: CPT

## 2024-03-15 PROCEDURE — 97162 PT EVAL MOD COMPLEX 30 MIN: CPT

## 2024-03-15 PROCEDURE — 92526 ORAL FUNCTION THERAPY: CPT

## 2024-03-15 PROCEDURE — 92612 ENDOSCOPY SWALLOW (FEES) VID: CPT

## 2024-03-15 PROCEDURE — 85027 COMPLETE CBC AUTOMATED: CPT

## 2024-03-15 PROCEDURE — 97530 THERAPEUTIC ACTIVITIES: CPT

## 2024-03-15 PROCEDURE — 97535 SELF CARE MNGMENT TRAINING: CPT

## 2024-03-15 PROCEDURE — 6360000002 HC RX W HCPCS: Performed by: INTERNAL MEDICINE

## 2024-03-15 PROCEDURE — 1100000003 HC PRIVATE W/ TELEMETRY

## 2024-03-15 PROCEDURE — 99497 ADVNCD CARE PLAN 30 MIN: CPT | Performed by: NURSE PRACTITIONER

## 2024-03-15 PROCEDURE — 6370000000 HC RX 637 (ALT 250 FOR IP): Performed by: INTERNAL MEDICINE

## 2024-03-15 PROCEDURE — 80048 BASIC METABOLIC PNL TOTAL CA: CPT

## 2024-03-15 RX ORDER — DEXTROSE AND SODIUM CHLORIDE 5; .9 G/100ML; G/100ML
INJECTION, SOLUTION INTRAVENOUS CONTINUOUS
Status: ACTIVE | OUTPATIENT
Start: 2024-03-15 | End: 2024-03-16

## 2024-03-15 RX ADMIN — HYDRALAZINE HYDROCHLORIDE 10 MG: 20 INJECTION INTRAMUSCULAR; INTRAVENOUS at 10:40

## 2024-03-15 RX ADMIN — ASPIRIN 81 MG: 81 TABLET, CHEWABLE ORAL at 11:02

## 2024-03-15 RX ADMIN — RIVAROXABAN 15 MG: 15 TABLET, FILM COATED ORAL at 11:02

## 2024-03-15 RX ADMIN — NEOMYCIN SULFATE, POLYMYXIN B SULFATE, AND DEXAMETHASONE: 3.5; 10000; 1 OINTMENT OPHTHALMIC at 21:27

## 2024-03-15 RX ADMIN — SODIUM CHLORIDE, PRESERVATIVE FREE 10 ML: 5 INJECTION INTRAVENOUS at 21:28

## 2024-03-15 RX ADMIN — SODIUM CHLORIDE, PRESERVATIVE FREE 10 ML: 5 INJECTION INTRAVENOUS at 10:41

## 2024-03-15 RX ADMIN — NEOMYCIN SULFATE, POLYMYXIN B SULFATE, AND DEXAMETHASONE: 3.5; 10000; 1 OINTMENT OPHTHALMIC at 09:36

## 2024-03-15 RX ADMIN — NEOMYCIN SULFATE, POLYMYXIN B SULFATE, AND DEXAMETHASONE: 3.5; 10000; 1 OINTMENT OPHTHALMIC at 15:17

## 2024-03-15 NOTE — ACP (ADVANCE CARE PLANNING)
Advance Care Planning     Palliative Medicine Team  Advance Care Planning (ACP) Conversation        Date of Conversation: 03/15/24    The patient and/or authorized decision maker consented to a voluntary Advance Care Planning conversation.   Individuals present for the conversation:  Patient with decision making capacity  Others present: Bedside nurse    ACP documents on file prior to discussion:  [] Advance Medical Directive  [x] Portable DNR  [] POLST  [] None    Healthcare Decision Maker:    Primary Decision Maker: Maddie Dhaliwal - Child - [phone]    Primary Decision Maker: MulliganJesusSarah - Child - [phone]    Primary Palliative Diagnosis:  Ischemic Stroke  DNR Discussion    Conversation Summary:  Brought Ms Akins's DNR to bedside that she completed in 2021 that was in her medical record  She shared that she DID remembered completing it  I asked if she wanted to keep it on her chart, or did she want me to throw it away  She said \"Keep it\"  This is consistent with her prior stated wishes dating back to 2015 when she has been hospitalized in the past.     Entered DNR order into the computer  This document is signed by her own hand, so she is the only one who can revoke it      Outcomes / Completed Documentation:    Resuscitation Status:   Code Status: DNR     “An explanation of advance directives and their importance was provided and the following forms completed:”  [] Advance Medical Directive  [] Portable DNR  [] POLST  [x] No new documents completed      I spent 16 minutes providing separately identifiable ACP services with the patient and/or surrogate decision maker in a voluntary, in-person conversation discussing the patient's wishes and goals as detailed in the above note.       Jessica Lamar, APRN - NP

## 2024-03-16 LAB
ANION GAP SERPL CALC-SCNC: 5 MMOL/L (ref 5–15)
BUN SERPL-MCNC: 22 MG/DL (ref 6–20)
BUN/CREAT SERPL: 19 (ref 12–20)
CALCIUM SERPL-MCNC: 9.5 MG/DL (ref 8.5–10.1)
CHLORIDE SERPL-SCNC: 114 MMOL/L (ref 97–108)
CO2 SERPL-SCNC: 25 MMOL/L (ref 21–32)
CREAT SERPL-MCNC: 1.16 MG/DL (ref 0.55–1.02)
ERYTHROCYTE [DISTWIDTH] IN BLOOD BY AUTOMATED COUNT: 13.2 % (ref 11.5–14.5)
GLUCOSE SERPL-MCNC: 114 MG/DL (ref 65–100)
HCT VFR BLD AUTO: 37.7 % (ref 35–47)
HGB BLD-MCNC: 11.6 G/DL (ref 11.5–16)
MCH RBC QN AUTO: 30.2 PG (ref 26–34)
MCHC RBC AUTO-ENTMCNC: 30.8 G/DL (ref 30–36.5)
MCV RBC AUTO: 98.2 FL (ref 80–99)
NRBC # BLD: 0 K/UL (ref 0–0.01)
NRBC BLD-RTO: 0 PER 100 WBC
PLATELET # BLD AUTO: 258 K/UL (ref 150–400)
PMV BLD AUTO: 10.2 FL (ref 8.9–12.9)
POTASSIUM SERPL-SCNC: 3.9 MMOL/L (ref 3.5–5.1)
RBC # BLD AUTO: 3.84 M/UL (ref 3.8–5.2)
SODIUM SERPL-SCNC: 144 MMOL/L (ref 136–145)
WBC # BLD AUTO: 11.1 K/UL (ref 3.6–11)

## 2024-03-16 PROCEDURE — 92507 TX SP LANG VOICE COMM INDIV: CPT

## 2024-03-16 PROCEDURE — 80048 BASIC METABOLIC PNL TOTAL CA: CPT

## 2024-03-16 PROCEDURE — 36415 COLL VENOUS BLD VENIPUNCTURE: CPT

## 2024-03-16 PROCEDURE — 85027 COMPLETE CBC AUTOMATED: CPT

## 2024-03-16 PROCEDURE — 6360000002 HC RX W HCPCS: Performed by: INTERNAL MEDICINE

## 2024-03-16 PROCEDURE — 2580000003 HC RX 258: Performed by: NURSE PRACTITIONER

## 2024-03-16 PROCEDURE — 6370000000 HC RX 637 (ALT 250 FOR IP): Performed by: NURSE PRACTITIONER

## 2024-03-16 PROCEDURE — 92526 ORAL FUNCTION THERAPY: CPT

## 2024-03-16 PROCEDURE — 2580000003 HC RX 258: Performed by: INTERNAL MEDICINE

## 2024-03-16 PROCEDURE — 1100000003 HC PRIVATE W/ TELEMETRY

## 2024-03-16 PROCEDURE — 6370000000 HC RX 637 (ALT 250 FOR IP): Performed by: INTERNAL MEDICINE

## 2024-03-16 RX ORDER — SODIUM CHLORIDE 9 MG/ML
INJECTION, SOLUTION INTRAVENOUS CONTINUOUS
Status: DISCONTINUED | OUTPATIENT
Start: 2024-03-16 | End: 2024-03-18

## 2024-03-16 RX ADMIN — ATORVASTATIN CALCIUM 40 MG: 40 TABLET, FILM COATED ORAL at 20:21

## 2024-03-16 RX ADMIN — HYDRALAZINE HYDROCHLORIDE 10 MG: 20 INJECTION INTRAMUSCULAR; INTRAVENOUS at 09:11

## 2024-03-16 RX ADMIN — HYDRALAZINE HYDROCHLORIDE 10 MG: 20 INJECTION INTRAMUSCULAR; INTRAVENOUS at 16:22

## 2024-03-16 RX ADMIN — RIVAROXABAN 15 MG: 15 TABLET, FILM COATED ORAL at 09:11

## 2024-03-16 RX ADMIN — ACETAMINOPHEN 650 MG: 325 TABLET ORAL at 15:51

## 2024-03-16 RX ADMIN — NEOMYCIN SULFATE, POLYMYXIN B SULFATE, AND DEXAMETHASONE: 3.5; 10000; 1 OINTMENT OPHTHALMIC at 14:28

## 2024-03-16 RX ADMIN — SODIUM CHLORIDE, PRESERVATIVE FREE 10 ML: 5 INJECTION INTRAVENOUS at 21:53

## 2024-03-16 RX ADMIN — NEOMYCIN SULFATE, POLYMYXIN B SULFATE, AND DEXAMETHASONE: 3.5; 10000; 1 OINTMENT OPHTHALMIC at 20:22

## 2024-03-16 RX ADMIN — DEXTROSE AND SODIUM CHLORIDE: 5; 900 INJECTION, SOLUTION INTRAVENOUS at 00:04

## 2024-03-16 RX ADMIN — SODIUM CHLORIDE: 9 INJECTION, SOLUTION INTRAVENOUS at 14:21

## 2024-03-16 RX ADMIN — ASPIRIN 81 MG: 81 TABLET, CHEWABLE ORAL at 09:11

## 2024-03-16 ASSESSMENT — PAIN SCALES - GENERAL
PAINLEVEL_OUTOF10: 1
PAINLEVEL_OUTOF10: 1

## 2024-03-16 ASSESSMENT — PAIN DESCRIPTION - LOCATION: LOCATION: GENERALIZED

## 2024-03-17 LAB
ANION GAP SERPL CALC-SCNC: 5 MMOL/L (ref 5–15)
BUN SERPL-MCNC: 21 MG/DL (ref 6–20)
BUN/CREAT SERPL: 21 (ref 12–20)
CALCIUM SERPL-MCNC: 9.5 MG/DL (ref 8.5–10.1)
CHLORIDE SERPL-SCNC: 117 MMOL/L (ref 97–108)
CO2 SERPL-SCNC: 22 MMOL/L (ref 21–32)
CREAT SERPL-MCNC: 0.98 MG/DL (ref 0.55–1.02)
GLUCOSE SERPL-MCNC: 131 MG/DL (ref 65–100)
POTASSIUM SERPL-SCNC: 3.7 MMOL/L (ref 3.5–5.1)
SODIUM SERPL-SCNC: 144 MMOL/L (ref 136–145)

## 2024-03-17 PROCEDURE — 1100000003 HC PRIVATE W/ TELEMETRY

## 2024-03-17 PROCEDURE — 2580000003 HC RX 258: Performed by: NURSE PRACTITIONER

## 2024-03-17 PROCEDURE — 80048 BASIC METABOLIC PNL TOTAL CA: CPT

## 2024-03-17 PROCEDURE — 6370000000 HC RX 637 (ALT 250 FOR IP): Performed by: INTERNAL MEDICINE

## 2024-03-17 PROCEDURE — 2580000003 HC RX 258: Performed by: INTERNAL MEDICINE

## 2024-03-17 PROCEDURE — 6360000002 HC RX W HCPCS: Performed by: INTERNAL MEDICINE

## 2024-03-17 PROCEDURE — 36415 COLL VENOUS BLD VENIPUNCTURE: CPT

## 2024-03-17 PROCEDURE — 6370000000 HC RX 637 (ALT 250 FOR IP): Performed by: NURSE PRACTITIONER

## 2024-03-17 RX ADMIN — HYDRALAZINE HYDROCHLORIDE 10 MG: 20 INJECTION INTRAMUSCULAR; INTRAVENOUS at 20:12

## 2024-03-17 RX ADMIN — SODIUM CHLORIDE, PRESERVATIVE FREE 10 ML: 5 INJECTION INTRAVENOUS at 20:14

## 2024-03-17 RX ADMIN — NEOMYCIN SULFATE, POLYMYXIN B SULFATE, AND DEXAMETHASONE: 3.5; 10000; 1 OINTMENT OPHTHALMIC at 14:36

## 2024-03-17 RX ADMIN — ATORVASTATIN CALCIUM 40 MG: 40 TABLET, FILM COATED ORAL at 20:13

## 2024-03-17 RX ADMIN — POLYETHYLENE GLYCOL 3350 17 G: 17 POWDER, FOR SOLUTION ORAL at 11:08

## 2024-03-17 RX ADMIN — NEOMYCIN SULFATE, POLYMYXIN B SULFATE, AND DEXAMETHASONE: 3.5; 10000; 1 OINTMENT OPHTHALMIC at 20:13

## 2024-03-17 RX ADMIN — ASPIRIN 81 MG: 81 TABLET, CHEWABLE ORAL at 09:27

## 2024-03-17 RX ADMIN — SODIUM CHLORIDE, PRESERVATIVE FREE 10 ML: 5 INJECTION INTRAVENOUS at 09:28

## 2024-03-17 RX ADMIN — HYDRALAZINE HYDROCHLORIDE 10 MG: 20 INJECTION INTRAMUSCULAR; INTRAVENOUS at 09:40

## 2024-03-17 RX ADMIN — ACETAMINOPHEN 650 MG: 325 TABLET ORAL at 11:12

## 2024-03-17 RX ADMIN — SODIUM CHLORIDE: 9 INJECTION, SOLUTION INTRAVENOUS at 14:42

## 2024-03-17 RX ADMIN — NEOMYCIN SULFATE, POLYMYXIN B SULFATE, AND DEXAMETHASONE: 3.5; 10000; 1 OINTMENT OPHTHALMIC at 09:28

## 2024-03-17 RX ADMIN — RIVAROXABAN 15 MG: 15 TABLET, FILM COATED ORAL at 09:27

## 2024-03-17 ASSESSMENT — PAIN DESCRIPTION - LOCATION
LOCATION: GENERALIZED
LOCATION: GENERALIZED

## 2024-03-17 ASSESSMENT — PAIN SCALES - GENERAL
PAINLEVEL_OUTOF10: 1
PAINLEVEL_OUTOF10: 1

## 2024-03-18 ENCOUNTER — HOSPICE ADMISSION (OUTPATIENT)
Age: 89
End: 2024-03-18
Payer: MEDICARE

## 2024-03-18 VITALS
RESPIRATION RATE: 18 BRPM | DIASTOLIC BLOOD PRESSURE: 80 MMHG | SYSTOLIC BLOOD PRESSURE: 164 MMHG | HEART RATE: 100 BPM | TEMPERATURE: 97.3 F | BODY MASS INDEX: 31.74 KG/M2 | HEIGHT: 68 IN | OXYGEN SATURATION: 96 % | WEIGHT: 209.44 LBS

## 2024-03-18 PROCEDURE — 2580000003 HC RX 258: Performed by: INTERNAL MEDICINE

## 2024-03-18 PROCEDURE — 92526 ORAL FUNCTION THERAPY: CPT

## 2024-03-18 PROCEDURE — 6370000000 HC RX 637 (ALT 250 FOR IP): Performed by: NURSE PRACTITIONER

## 2024-03-18 PROCEDURE — 1100000003 HC PRIVATE W/ TELEMETRY

## 2024-03-18 PROCEDURE — 2580000003 HC RX 258: Performed by: NURSE PRACTITIONER

## 2024-03-18 PROCEDURE — 6360000002 HC RX W HCPCS: Performed by: INTERNAL MEDICINE

## 2024-03-18 PROCEDURE — 6370000000 HC RX 637 (ALT 250 FOR IP): Performed by: INTERNAL MEDICINE

## 2024-03-18 RX ORDER — BISACODYL 10 MG
10 SUPPOSITORY, RECTAL RECTAL DAILY PRN
Status: DISCONTINUED | OUTPATIENT
Start: 2024-03-18 | End: 2024-03-19 | Stop reason: HOSPADM

## 2024-03-18 RX ADMIN — SODIUM CHLORIDE: 9 INJECTION, SOLUTION INTRAVENOUS at 11:31

## 2024-03-18 RX ADMIN — ASPIRIN 81 MG: 81 TABLET, CHEWABLE ORAL at 09:40

## 2024-03-18 RX ADMIN — POLYETHYLENE GLYCOL 3350 17 G: 17 POWDER, FOR SOLUTION ORAL at 09:40

## 2024-03-18 RX ADMIN — SODIUM CHLORIDE, PRESERVATIVE FREE 10 ML: 5 INJECTION INTRAVENOUS at 09:52

## 2024-03-18 RX ADMIN — HYDRALAZINE HYDROCHLORIDE 10 MG: 20 INJECTION INTRAMUSCULAR; INTRAVENOUS at 09:49

## 2024-03-18 RX ADMIN — NEOMYCIN SULFATE, POLYMYXIN B SULFATE, AND DEXAMETHASONE: 3.5; 10000; 1 OINTMENT OPHTHALMIC at 16:21

## 2024-03-18 RX ADMIN — HYDRALAZINE HYDROCHLORIDE 10 MG: 20 INJECTION INTRAMUSCULAR; INTRAVENOUS at 00:53

## 2024-03-18 RX ADMIN — ACETAMINOPHEN 650 MG: 325 TABLET ORAL at 20:12

## 2024-03-18 RX ADMIN — ATORVASTATIN CALCIUM 40 MG: 40 TABLET, FILM COATED ORAL at 20:12

## 2024-03-18 RX ADMIN — SODIUM CHLORIDE, PRESERVATIVE FREE 10 ML: 5 INJECTION INTRAVENOUS at 20:12

## 2024-03-18 RX ADMIN — NEOMYCIN SULFATE, POLYMYXIN B SULFATE, AND DEXAMETHASONE: 3.5; 10000; 1 OINTMENT OPHTHALMIC at 09:45

## 2024-03-18 RX ADMIN — RIVAROXABAN 15 MG: 15 TABLET, FILM COATED ORAL at 09:40

## 2024-03-18 ASSESSMENT — PAIN DESCRIPTION - LOCATION: LOCATION: BACK

## 2024-03-18 ASSESSMENT — PAIN SCALES - GENERAL
PAINLEVEL_OUTOF10: 0
PAINLEVEL_OUTOF10: 5

## 2024-03-18 ASSESSMENT — PAIN DESCRIPTION - DESCRIPTORS: DESCRIPTORS: ACHING

## 2024-03-18 NOTE — PLAN OF CARE
Problem: Discharge Planning  Goal: Discharge to home or other facility with appropriate resources  3/15/2024 1129 by Santa Capone RN  Outcome: Progressing  3/14/2024 2205 by Lorena Veronica RN  Outcome: Progressing     Problem: Pain  Goal: Verbalizes/displays adequate comfort level or baseline comfort level  3/15/2024 1129 by Santa Capone RN  Outcome: Progressing  3/14/2024 2205 by Lorena Veronica RN  Outcome: Progressing     Problem: Skin/Tissue Integrity  Goal: Absence of new skin breakdown  Description: 1.  Monitor for areas of redness and/or skin breakdown  2.  Assess vascular access sites hourly  3.  Every 4-6 hours minimum:  Change oxygen saturation probe site  4.  Every 4-6 hours:  If on nasal continuous positive airway pressure, respiratory therapy assess nares and determine need for appliance change or resting period.  3/15/2024 1129 by Santa Capone RN  Outcome: Progressing  3/14/2024 2205 by Lorena Veronica RN  Outcome: Progressing     Problem: ABCDS Injury Assessment  Goal: Absence of physical injury  3/15/2024 1129 by Santa Capone RN  Outcome: Progressing  3/14/2024 2205 by Lorena Veronica RN  Outcome: Progressing     Problem: Safety - Adult  Goal: Free from fall injury  3/15/2024 1129 by Santa Capone RN  Outcome: Progressing  3/14/2024 2205 by Lorena Veronica RN  Outcome: Progressing     Problem: Neurosensory - Adult  Goal: Achieves maximal functionality and self care  3/15/2024 1129 by Santa Capone RN  Outcome: Progressing  3/14/2024 2205 by Lorena Veronica RN  Outcome: Progressing     Problem: Skin/Tissue Integrity - Adult  Goal: Skin integrity remains intact  3/15/2024 1129 by Santa Capone RN  Outcome: Progressing  3/14/2024 2205 by Lorena Veronica RN  Outcome: Progressing  Flowsheets (Taken 3/14/2024 2030)  Skin Integrity Remains Intact: Monitor for areas of redness and/or skin breakdown  Goal: Incisions, wounds, or drain sites healing without S/S of infection  3/15/2024 1129 by Liborio 
  Problem: Discharge Planning  Goal: Discharge to home or other facility with appropriate resources  3/17/2024 2130 by Anca Stewart RN  Outcome: Progressing  3/17/2024 1053 by Bruce Wheeler RN  Outcome: Progressing     Problem: Pain  Goal: Verbalizes/displays adequate comfort level or baseline comfort level  3/17/2024 2130 by Anca Stewart RN  Outcome: Progressing  3/17/2024 1053 by Bruce Wheeler RN  Outcome: Progressing     Problem: Skin/Tissue Integrity  Goal: Absence of new skin breakdown  Description: 1.  Monitor for areas of redness and/or skin breakdown  2.  Assess vascular access sites hourly  3.  Every 4-6 hours minimum:  Change oxygen saturation probe site  4.  Every 4-6 hours:  If on nasal continuous positive airway pressure, respiratory therapy assess nares and determine need for appliance change or resting period.  3/17/2024 2130 by Anca Stewart RN  Outcome: Progressing  3/17/2024 1053 by Bruce Wheeler RN  Outcome: Progressing     Problem: ABCDS Injury Assessment  Goal: Absence of physical injury  3/17/2024 2130 by Anca Stewart RN  Outcome: Progressing  3/17/2024 1053 by Bruce Wheeler RN  Outcome: Progressing     Problem: Safety - Adult  Goal: Free from fall injury  3/17/2024 2130 by Anca Stewart RN  Outcome: Progressing  3/17/2024 1053 by Bruce Wheeler RN  Outcome: Progressing     Problem: Neurosensory - Adult  Goal: Achieves maximal functionality and self care  3/17/2024 2130 by Anca Stewart RN  Outcome: Progressing  3/17/2024 1053 by Bruce Wheeler RN  Outcome: Progressing     Problem: Skin/Tissue Integrity - Adult  Goal: Skin integrity remains intact  3/17/2024 2130 by Anca Stewart RN  Outcome: Progressing  Flowsheets (Taken 3/17/2024 1924)  Skin Integrity Remains Intact: Monitor for areas of redness and/or skin breakdown  3/17/2024 1053 by Bruce Wheeler RN  Outcome: Progressing  Goal: Incisions, wounds, or drain sites healing without S/S of 
  Problem: Neurosensory - Adult  Goal: Achieves maximal functionality and self care  3/14/2024 1232 by Santa Capone RN  Outcome: Not Progressing  Flowsheets (Taken 3/14/2024 0539 by Lorena Veronica RN)  Achieves maximal functionality and self care: Monitor swallowing and airway patency with patient fatigue and changes in neurological status  3/14/2024 0402 by Lorena Veronica RN  Outcome: Progressing     Problem: Skin/Tissue Integrity - Adult  Goal: Oral mucous membranes remain intact  3/14/2024 1232 by Santa Capone RN  Outcome: Not Progressing  3/14/2024 0402 by Lorena Veronica RN  Outcome: Progressing     Problem: Chronic Conditions and Co-morbidities  Goal: Patient's chronic conditions and co-morbidity symptoms are monitored and maintained or improved  Outcome: Not Progressing     Problem: Discharge Planning  Goal: Discharge to home or other facility with appropriate resources  3/14/2024 1232 by Santa Capone RN  Outcome: Progressing  3/14/2024 0402 by Lorena Veronica RN  Outcome: Progressing     Problem: Pain  Goal: Verbalizes/displays adequate comfort level or baseline comfort level  3/14/2024 1232 by Santa Capone RN  Outcome: Progressing  3/14/2024 0402 by Lorena Veronica RN  Outcome: Progressing  Flowsheets (Taken 3/14/2024 0215)  Verbalizes/displays adequate comfort level or baseline comfort level: Encourage patient to monitor pain and request assistance     Problem: Skin/Tissue Integrity  Goal: Absence of new skin breakdown  Description: 1.  Monitor for areas of redness and/or skin breakdown  2.  Assess vascular access sites hourly  3.  Every 4-6 hours minimum:  Change oxygen saturation probe site  4.  Every 4-6 hours:  If on nasal continuous positive airway pressure, respiratory therapy assess nares and determine need for appliance change or resting period.  3/14/2024 1232 by Santa Capone RN  Outcome: Progressing  3/14/2024 0402 by Lorena Veronica RN  Outcome: Progressing     Problem: ABCDS Injury 
  Problem: Occupational Therapy - Adult  Goal: By Discharge: Performs self-care activities at highest level of function for planned discharge setting.  See evaluation for individualized goals.  Description: FUNCTIONAL STATUS PRIOR TO ADMISSION:  Independent for all ADLs and Mod I for transfers from lift chair <> power chair <> toilet.    Receives Help From: Family, ADL Assistance: Independent, Bath: Independent, Dressing: Independent, Grooming: Independent, Feeding: Independent, Toileting: Independent, Homemaking Assistance: Needs assistance, Ambulation Assistance: Non-ambulatory (primarily transfers lift chair<>wheelchair<>toilet, using wheelchair as primary mode of mobility), Transfer Assistance: Independent, Active : No      HOME SUPPORT: Patient lived with daughter who provided assistance.     Occupational Therapy Goals  Initiated 3/15/2024   1.  Patient will perform grooming with Moderate Assist sitting EOB within 7 day(s).  2.  Patient will perform bathing upper body with Minimal Assist within 7 day(s).  3.  Patient will perform upper body dressing with Minimal Assist within 7 day(s).  4.  Patient will perform toilet transfers with Moderate Assist within 7 day(s).  5.  Patient will perform all aspects of toileting with Moderate Assist within 7 day(s).  6.  Patient will participate in upper extremity therapeutic exercise/activities with Minimal Assist within 7 day(s).    7.  Patient will utilize energy conservation techniques during functional activities with verbal cues within 7 day(s).  8.  Patient will improve their Fugl Mc score by 5 points in prep for ADLs within 7 days.   9.  Patient will perform lower body dressing with Moderate Assist using AE PRN within 7 day(s).  Outcome: Not Progressing     OCCUPATIONAL THERAPY EVALUATION    Patient: Laura Akins (89 y.o. female)  Date: 3/15/2024  Primary Diagnosis: Acute ischemic stroke (HCC) [I63.9]  Cerebrovascular accident (CVA), unspecified 
Continue POC    Problem: SLP Adult - Impaired Swallowing  Goal: By Discharge: Advance to least restrictive diet without signs or symptoms of aspiration for planned discharge setting.  See evaluation for individualized goals.  Description: Speech Pathology Goals Initiated 3/14/24    1.  Patient will participate in re-evaluation of swallow function within 7 days.  2.  Patient will participate in speech/language evaluation as medically appropriate.  3/14/2024 1303 by Lacie Puckett SLP  Outcome: Not Progressing  3/14/2024 1300 by Lacie Puckett SLP  Outcome: Not Progressing     Problem: Discharge Planning  Goal: Discharge to home or other facility with appropriate resources  3/14/2024 2205 by Lorena Veronica RN  Outcome: Progressing  3/14/2024 1232 by Santa Capone RN  Outcome: Progressing     Problem: Pain  Goal: Verbalizes/displays adequate comfort level or baseline comfort level  3/14/2024 2205 by Lorena Veronica RN  Outcome: Progressing  3/14/2024 1232 by Santa Capone RN  Outcome: Progressing     Problem: Skin/Tissue Integrity  Goal: Absence of new skin breakdown  Description: 1.  Monitor for areas of redness and/or skin breakdown  2.  Assess vascular access sites hourly  3.  Every 4-6 hours minimum:  Change oxygen saturation probe site  4.  Every 4-6 hours:  If on nasal continuous positive airway pressure, respiratory therapy assess nares and determine need for appliance change or resting period.  3/14/2024 2205 by Lorena Veronica RN  Outcome: Progressing  3/14/2024 1232 by Santa Capone RN  Outcome: Progressing     Problem: ABCDS Injury Assessment  Goal: Absence of physical injury  3/14/2024 2205 by Lorena Veronica RN  Outcome: Progressing  3/14/2024 1232 by Santa Capone RN  Outcome: Progressing     Problem: Safety - Adult  Goal: Free from fall injury  3/14/2024 2205 by Lorena Veronica RN  Outcome: Progressing  3/14/2024 1232 by Santa Capone RN  Outcome: Progressing     Problem: Neurosensory - 
POC initiated    Problem: Discharge Planning  Goal: Discharge to home or other facility with appropriate resources  Outcome: Progressing     Problem: Pain  Goal: Verbalizes/displays adequate comfort level or baseline comfort level  Outcome: Progressing  Flowsheets (Taken 3/14/2024 0215)  Verbalizes/displays adequate comfort level or baseline comfort level: Encourage patient to monitor pain and request assistance     Problem: Skin/Tissue Integrity  Goal: Absence of new skin breakdown  Description: 1.  Monitor for areas of redness and/or skin breakdown  2.  Assess vascular access sites hourly  3.  Every 4-6 hours minimum:  Change oxygen saturation probe site  4.  Every 4-6 hours:  If on nasal continuous positive airway pressure, respiratory therapy assess nares and determine need for appliance change or resting period.  Outcome: Progressing     Problem: ABCDS Injury Assessment  Goal: Absence of physical injury  Outcome: Progressing     Problem: Safety - Adult  Goal: Free from fall injury  Outcome: Progressing     Problem: Neurosensory - Adult  Goal: Achieves maximal functionality and self care  Outcome: Progressing     Problem: Skin/Tissue Integrity - Adult  Goal: Skin integrity remains intact  Outcome: Progressing  Goal: Incisions, wounds, or drain sites healing without S/S of infection  Outcome: Progressing  Goal: Oral mucous membranes remain intact  Outcome: Progressing     Problem: Hematologic - Adult  Goal: Maintains hematologic stability  Outcome: Progressing     
Speech LAnguage Pathology Dysphagia TREATMENT and speech evaluation    Patient: Laura Akins (89 y.o. female)  Date: 3/15/2024  Primary Diagnosis: Acute ischemic stroke (HCC) [I63.9]  Cerebrovascular accident (CVA), unspecified mechanism (HCC) [I63.9]  Stroke of unknown cause (HCC) [I63.9]       Precautions: aspiration                    ASSESSMENT :  Based on the objective data described below, the patient presents with high risk for aspiration due to acute right posterior frontal lobe infarct, severity of L weakness with L droop and poor lingual lateralization to L, and coughing with thin liquids. Recommend FEES to fully assess pharyngeal swallow function.    Patient also with severe dysarthria characterized by poor breath support, respiration/phonation incoordination, imprecise articulation, and blended word boundaries resulting in poor intelligibility. Cued patient to take a breath before speaking which improved intelligibility. Recommend intensive SLP treatment to improve functional communication.    Patient will benefit from skilled intervention to address the above impairments.     PLAN :  Recommendations and Planned Interventions:  Diet: NPO  FEES today  Cue patient to take a breath prior to speaking to improve intelligibility     Acute SLP Services: Yes, SLP will continue to follow per plan of care.    Discharge Recommendations: Yes, recommend SLP treatment at next level of care     SUBJECTIVE:   Patient Ox4 given known context, however limited by dysarthria.    OBJECTIVE:     Past Medical History:   Diagnosis Date    Arrhythmia     atrial fibrillation 2017    Arthritis     CAD (coronary artery disease)     MI    CKD (chronic kidney disease) stage 3    High cholesterol     Hypertension     Other ill-defined conditions(799.89)     high cholesterol    Stroke (HCC)      Past Surgical History:   Procedure Laterality Date    GYN      hysterectomy    HEENT      tonsilectomy    ORTHOPEDIC SURGERY      L 
Speech LAnguage Pathology EVALUATION    Patient: Laura Akins (89 y.o. female)  Date: 3/14/2024  Primary Diagnosis: Acute ischemic stroke (HCC) [I63.9]  Cerebrovascular accident (CVA), unspecified mechanism (HCC) [I63.9]  Stroke of unknown cause (HCC) [I63.9]       Precautions:                     ASSESSMENT :  Based on the objective data described below, the patient presents with severe oropharyngeal dysphagia characterized by absent bolus acceptance and retrieval with anterior spillage of all ice chip trials.  Patient unable to sip thin liquid from straw and no attempt to retrieve pureed bolus or lick lips when placed on lips.  Placed 1/2 teaspoon of thin liquids in mouth with minimal attempt to manipulate orally.  No pharyngeal swallow elicited.  Suspect lethargy playing a role in poor swallow function.  Risk of aspiration is high and NPO is recommended.  Speech/language evaluation deferred given patient lethargy.    Patient will benefit from skilled intervention to address the above impairments.     PLAN :  Recommendations and Planned Interventions:  Diet: NPO  Oral care  SLP to follow for re-evaluation of swallow function and assessment of speech/language     Acute SLP Services: Yes, patient will be followed by speech-language pathology 3x/week to address goals. Patient's rehabilitation potential is considered to be Guarded.    Discharge Recommendations: Continue to assess pending progress     SUBJECTIVE:   Patient sleeping soundly but opened eyes to repositioning.  Patient's daughter reports unintelligible words when attempting to talk.    OBJECTIVE:     Past Medical History:   Diagnosis Date    Arrhythmia     atrial fibrillation 2017    Arthritis     CAD (coronary artery disease)     MI    CKD (chronic kidney disease) stage 3    High cholesterol     Hypertension     Other ill-defined conditions(799.89)     high cholesterol    Stroke (HCC)      Past Surgical History:   Procedure Laterality Date    GYN   
Speech Language Pathology  Flexible Endoscopic Evaluation of Swallowing-FEES  Patient: Laura Akins (89 y.o. female)  Date: 3/15/2024  Primary Diagnosis: Acute ischemic stroke (HCC) [I63.9]  Cerebrovascular accident (CVA), unspecified mechanism (HCC) [I63.9]  Stroke of unknown cause (HCC) [I63.9]       Precautions: aspiration                     ASSESSMENT :  Based on the objective data described below, the patient presents with primarily oral dysphagia related to L labial and lingual weakness. Mild pharyngeal dysphagia is characterized by swallow initiation at the level of the pyriform sinuses, spilling over the inter-arytenoid space with thin liquids resulting in penetration and aspiration. Aspiration elicited strong cough that was ineffective in clearing aspirate. No penetration/aspiration with purees or mildly thick liquids. Vallecular residue with purees cleared with double swallow or liquid wash. Recommend diet initiation and compensatory strategies as below.    Patient will benefit from skilled intervention to address the above impairments.     PLAN :  Recommendations and Planned Interventions:  Diet: Puree and mildly thick liquids  1:1 feeding assistance  Meds in puree, double swallows or followed by mildly thick liquid wash  SLP following for dysphagia management and speech treatment  Note white patches on base of tongue, ?thrush. RN aware    Acute SLP Services: Yes, patient will be followed by speech-language pathology 3x/week to address goals. Patient's rehabilitation potential is considered to be Fair.  Discharge Recommendations: Yes, recommend SLP treatment at next level of care     SUBJECTIVE:   Patient with no verbalizations.    OBJECTIVE:     Past Medical History:   Diagnosis Date    Arrhythmia     atrial fibrillation 2017    Arthritis     CAD (coronary artery disease)     MI    CKD (chronic kidney disease) stage 3    High cholesterol     Hypertension     Other ill-defined conditions(799.89)  
Signs/Symptoms: None            Motor Speech:         Language Comprehension and Expression:                   Neuro-Linguistics:                      Voice:       Respiratory Status/Airway:  Room air                               After treatment:   Patient left in no apparent distress in bed, Call bell left within reach, Nursing notified, and Caregiver present    COMMUNICATION/EDUCATION:   Patient was educated regarding her deficit(s) of dysphagia as this relates to her diagnosis.  She demonstrated Guarded understanding as evidenced by  lethargy, however daughter verbalized understanding .    The patient's plan of care including recommendations, planned interventions, and recommended diet changes were discussed with: Registered nurse    Patient/family have participated as able in goal setting and plan of care and Patient/family agree to work toward stated goals and plan of care    Thank you,  Kaycee Arreola, SLP  Minutes: 16    Problem: SLP Adult - Impaired Swallowing  Goal: By Discharge: Advance to least restrictive diet without signs or symptoms of aspiration for planned discharge setting.  See evaluation for individualized goals.  Description: Speech Pathology Goals Initiated 3/14/24    1.  Patient will participate in re-evaluation of swallow function within 7 days. MET  2.  Patient will participate in speech/language evaluation as medically appropriate. MET  3. Added 3/15/2024 Patient will achieve 50% intelligibility at the word level given mod cues within 7 days  4. Added 3/15/2024 Patient will tolerate puree/mildly thick liquids with no overt s/s aspiration or adverse effects within 7 days  5. Added 3/15/2024 Patient will trial thin liquids with no overt s/s aspiration within 7 days  Outcome: Not Progressing     
goals.  Description: Speech Pathology Goals Initiated 3/14/24    1.  Patient will participate in re-evaluation of swallow function within 7 days. MET  2.  Patient will participate in speech/language evaluation as medically appropriate. MET  3. Added 3/15/2024 Patient will achieve 50% intelligibility at the word level given mod cues within 7 days  4. Added 3/15/2024 Patient will tolerate puree/mildly thick liquids with no overt s/s aspiration or adverse effects within 7 days  5. Added 3/15/2024 Patient will trial thin liquids with no overt s/s aspiration within 7 days  3/16/2024 0939 by Aletha Key, SLP  Outcome: Progressing     
(generally oriented to situation, did not assess date)  Cognition  Overall Cognitive Status: Exceptions  Arousal/Alertness: Delayed responses to stimuli  Following Commands: Follows one step commands consistently  Attention Span: Attends with cues to redirect;Difficulty dividing attention  Problem Solving: Assistance required to generate solutions;Assistance required to identify errors made  Insights: Decreased awareness of deficits  Initiation: Requires cues for some  Sequencing: Requires cues for some  Cognition Comment: lethargic, alert with cues    Skin: see wound care note for more details    Edema: significant edema R> L LE (consistent with baseline per daughter at bedside)  Pressure relief boot on R indenting into skin - RN alerted    Hearing:   Hearing  Hearing: Exceptions to WFL  Hearing Exceptions: Hard of hearing/hearing concerns    Vision/Perceptual:          Vision  Vision: Impaired (baseline macular degeneration, gets injections L eye)  Vision Exceptions: Visual field cut (L visual field cut)       Strength:    Strength:  (baseline R sided weakness, appears generally weak, moves all extremities to command)    Tone & Sensation:   Tone: Normal  Sensation:  (unable to formally assess due to cognition)    Coordination:  Coordination: Generally decreased, functional    Range Of Motion:  AROM: Generally decreased, functional       Functional Mobility:  Bed Mobility:     Bed Mobility Training  Bed Mobility Training: Yes  Rolling: Moderate assistance;Maximum assistance (able to assist more with delay, use of bed rail)  Supine to Sit: Maximum assistance;Additional time (HOB elevated)  Sit to Supine: Maximum assistance;Additional time (patient initiates movement)  Scooting: Total assistance (up in bed, patient does try to scoot self forward at edge of bed, requires max assist)  Balance:               Balance  Sitting: Impaired  Sitting - Static: Fair (occasional);Poor (constant support) (poor with fatigue, 
holding;Delayed  Propulsion: Other (comment) (suspect delayed)  Aspiration Signs/Symptoms: None  Pharyngeal Phase Characteristics: No impairment, issues, or problems            Motor Speech:  Motor Speech  Dysarthric Characteristics: Blended word boundaries;Decreased breath support;Imprecise  Intelligibility: Impaired  Word Intelligibility (%): 30 %  Overall Impairment Severity: Severe      Language Comprehension and Expression:                   Neuro-Linguistics:                      Voice:       Respiratory Status/Airway:  Room air                           Functional Oral Intake Scale (FOIS): 5--Total oral diet with multiple consistencies but requiring special preparation or compensations    After treatment:   Patient left in no apparent distress in bed, Call bell left within reach, Nursing notified, and Bed alarm activated    COMMUNICATION/EDUCATION:   Patient was educated regarding role of SLP, aspiration precautions, and motor speech recommendations.     The patient's plan of care including recommendations, planned interventions, and recommended diet changes were discussed with: Registered nurse    Patient/family agree to work toward stated goals and plan of care    Thank you,  Aletha Key SLP  Minutes: 32    Problem: SLP Adult - Impaired Swallowing  Goal: By Discharge: Advance to least restrictive diet without signs or symptoms of aspiration for planned discharge setting.  See evaluation for individualized goals.  Description: Speech Pathology Goals Initiated 3/14/24    1.  Patient will participate in re-evaluation of swallow function within 7 days. MET  2.  Patient will participate in speech/language evaluation as medically appropriate. MET  3. Added 3/15/2024 Patient will achieve 50% intelligibility at the word level given mod cues within 7 days  4. Added 3/15/2024 Patient will tolerate puree/mildly thick liquids with no overt s/s aspiration or adverse effects within 7 days  5. Added 3/15/2024 Patient

## 2024-03-19 ENCOUNTER — HOME CARE VISIT (OUTPATIENT)
Facility: HOME HEALTH | Age: 89
End: 2024-03-19
Payer: MEDICARE

## 2024-03-19 VITALS
HEART RATE: 88 BPM | RESPIRATION RATE: 16 BRPM | TEMPERATURE: 98.1 F | OXYGEN SATURATION: 96 % | DIASTOLIC BLOOD PRESSURE: 79 MMHG | SYSTOLIC BLOOD PRESSURE: 150 MMHG

## 2024-03-19 LAB
APPEARANCE UR: ABNORMAL
BACTERIA URNS QL MICRO: ABNORMAL /HPF
BILIRUB UR QL CFM: NEGATIVE
CAOX CRY URNS QL MICRO: ABNORMAL
COLOR UR: ABNORMAL
EPITH CASTS URNS QL MICRO: ABNORMAL /LPF
GLUCOSE UR STRIP.AUTO-MCNC: NEGATIVE MG/DL
HGB UR QL STRIP: NEGATIVE
KETONES UR QL STRIP.AUTO: ABNORMAL MG/DL
LEUKOCYTE ESTERASE UR QL STRIP.AUTO: ABNORMAL
NITRITE UR QL STRIP.AUTO: POSITIVE
PH UR STRIP: 5.5 (ref 5–8)
PROT UR STRIP-MCNC: 100 MG/DL
RBC #/AREA URNS HPF: ABNORMAL /HPF (ref 0–5)
SP GR UR REFRACTOMETRY: 1.02
URINE CULTURE IF INDICATED: ABNORMAL
UROBILINOGEN UR QL STRIP.AUTO: 1 EU/DL (ref 0.2–1)
WBC URNS QL MICRO: ABNORMAL /HPF (ref 0–4)

## 2024-03-19 PROCEDURE — 2500000001 HSPC NON INJECTABLE MED

## 2024-03-19 PROCEDURE — 87186 SC STD MICRODIL/AGAR DIL: CPT

## 2024-03-19 PROCEDURE — 87086 URINE CULTURE/COLONY COUNT: CPT

## 2024-03-19 PROCEDURE — 87077 CULTURE AEROBIC IDENTIFY: CPT

## 2024-03-19 PROCEDURE — 0651 HSPC ROUTINE HOME CARE

## 2024-03-19 PROCEDURE — G0299 HHS/HOSPICE OF RN EA 15 MIN: HCPCS

## 2024-03-19 PROCEDURE — 81001 URINALYSIS AUTO W/SCOPE: CPT

## 2024-03-19 PROCEDURE — 6370000000 HC RX 637 (ALT 250 FOR IP): Performed by: INTERNAL MEDICINE

## 2024-03-19 RX ORDER — ASPIRIN 81 MG/1
81 TABLET, CHEWABLE ORAL DAILY
Qty: 30 TABLET | Refills: 0 | Status: SHIPPED | OUTPATIENT
Start: 2024-03-20

## 2024-03-19 RX ADMIN — RIVAROXABAN 15 MG: 15 TABLET, FILM COATED ORAL at 08:16

## 2024-03-19 ASSESSMENT — ENCOUNTER SYMPTOMS
CONSTIPATION: 1
BOWEL INCONTINENCE: 1

## 2024-03-19 NOTE — CARE COORDINATION
Care Management Initial Assessment       RUR: 14% (low RUR)  Readmission? No  1st IM letter given? Yes - Pt access  1st  letter given: No    Pt sleeping at bedside, CM contacted pt's DTR. CM introduced self and role and completed initial assessment. CM verified demographic and clinical information.     Pt lives with DTR in a 1 level home, 0 KIYA. Pt DTR reports pt being independent in ADLs. Pt reports RW, WC and chair lift at home. Pt denies O2/CPAP at home. Pt's DTR states they are supported by their family. Patient is not an active . Pt reports hx of IPR at [facility], SNF at [facility] and [place] and HH with [facility].     CM discussed PT rec for SNF, pt's family is agreeable and would like referral placed to [facility]. CM sent FOC list to pt's DTR's email at [email or web address] for additional choices. Preliminary referral sent to [facility]. CM will continue to follow.        03/15/24 1068   Service Assessment   Patient Orientation Other (see comment);Unable to Assess  (Pt sleeping at this time)   Cognition Alert   History Provided By Child/Family   Primary Caregiver Family   Support Systems Children;Family Members   Patient's Healthcare Decision Maker is: Patient Declined (Legal Next of Kin Remains as Decision Maker)  (Has dDNR on file that would not open for CM, per pt and family this is not accurate)   PCP Verified by CM Yes   Last Visit to PCP Within last year   Prior Functional Level Housework;Shopping;Cooking   Current Functional Level Assistance with the following:;Bathing;Dressing;Housework;Cooking   Can patient return to prior living arrangement No   Ability to make needs known: Good   Family able to assist with home care needs: Yes   Would you like for me to discuss the discharge plan with any other family members/significant others, and if so, who? Yes  (Discuss with DTR)   Financial Resources Medicare   Social/Functional History   Lives With Daughter   Type of Home House   Home 
Transition of Care Plan:    RUR: 14% (moderate RUR)  Prior Level of Functioning: Needing assistance  Disposition: Home with hospice   If SNF or IPR: Date FOC offered: 03/15/24  Date FOC received: 03/15/24  Accepting facility: [facility]  Date authorization started with reference number:   Date authorization received and expires:   Follow up appointments: defer to hospice   DME needed: defer to hospice   Transportation at discharge: BLS requested  IM/IMM Medicare/ letter given: 2nd needed prior to d/c  Is patient a [place] and connected with [facility]? N/a   If yes, was  transfer form completed and VA notified? N/a  Caregiver Contact: Maddie Dhaliwal; DTR; [phone]  Discharge Caregiver contacted prior to discharge? CM to contact   Care Conference needed? Not at this time  Barriers to discharge: Hospice admission time    0813 - CM noted inpatient consult to hospice, there is no supporting documentation indicating why this consult was placed. Discussed with RN. CM will discuss in IDRs before approaching family or placing referral to [facility]. Plan prior to weekend was SNF, referral still pending to [facility]. CM will follow-up with [facility] and request additional referrals from family if SNF is dispo. No auth needed    0842 - RN supervisor confirmed with family that desire is for pt to return home with hospice. Pt's family would like eval and info session. Referral pushed through to [facility]. CM will continue to follow.     1547 - [facility] evaluated, pt will admit to hospice at home tomorrow morning at 1100. Hospice requesting transport for 1000. Transport packet complete, CM requested 1000 d/c.     ARIELA Cavazos  Care Management  [facility]  [phone]    
supports the patient's individualized plan of care/goals, treatment preferences, and shares the quality data associated with the providers?  Yes     ARIELA Cavazos  Care Management  [facility]  [phone]

## 2024-03-19 NOTE — PROGRESS NOTES
Progress Note      3/15/2024 3:50 PM  NAME: Laura Akins   MRN:  [id number]   Admit Diagnosis: Acute ischemic stroke (HCC) [I63.9]  Cerebrovascular accident (CVA), unspecified mechanism (HCC) [I63.9]  Stroke of unknown cause (HCC) [I63.9]     Primary Cardiologist: Dr. Enriquez  Physician Requesting consult: Dr. 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  CAD s/p PCI/stent of the LAD and RCA many years ago.  In 2008,  cath showed normal left ventricular systolic function.  She had an 80% stenosis in one diagonal.  The main  LAD was still widely patent as was the circumflex and right coronary and she has been treated medically.  Nuclear stress in June 2014 showed 72% LV EF, ischemia in diag distribution.  2.  Diastolic CHF, EF 60% by echo 7/14/15.  3.  History of a fib, persistent.  s/p CV on 8/8/17 to NSR. EKG on 9/6/17 showed her back in AFib.   4.  Sick sinus as her HR is  50/min after cardioversion.  Beta blocker has been discontinued.and amiodarone had been reduced to 100 mg per day.  She is OFF amiodarone as of 9/27/2017.   5.  Hypertension.   She had dry cough on ACE I.  She was switched to losartan in 8/14.  6.  Dyslipidemia.  7.  Chronic venous insufficiency with leg edema. Lymphedema .    8.  Prior history of CVA and needs a walker.  9.  Has chronic pain from shingles on her back.        CTA:   1. Probable ischemic penumbra in the right cerebellum and inferior occipital  lobe.  2. No large vessel occlusion.  3. At least 50% stenosis of the left common artery origin.  4. Subtotal occlusion of the left carotid bulb. Greater than 50% stenosis of the  right carotid bulb.  5. At least 50% stenosis of the mid left subclavian artery.  6. Severe stenosis of the right intra dural vertebral artery, distal to the PICA  origin.     CT head:   Chronic changes as above. No acute abnormality       MRI     IMPRESSION:  Acute right posterior 
      Hospitalist Progress Note    NAME:   Laura Akins   : 1929   MRN: [id number]     Date/Time: 3/16/2024 1:26 PM  Patient PCP: Elmer Cobb MD    Estimated discharge date:  Barriers: Medical stability, mental status improvement      Assessment / Plan:    Ischemia stroke acute, with left facial droop and word slurring, unknown time of onset    -MRI showed acute right posterior frontal infarct  -Continue aspirin  -PT OT evaluation  -Speech therapy consultation     Maverick on ckd likely 2/2 poor po intake h2o and on diuretics  -Renal function improved  -Hold IV fluid.  -Encourage oral intake        Chronic Conditions:  H/o afib and several cva with residual left sided wk and wheelchair bound at home- home xarelto- continued  OA- not taking meds, has taken prn tylenol- will order prn tylenol  ckd- follows dr llanes outpt, see above  hld- no longer on meds, see above  cad- no longer on meds, sees dr llanes  Gerd-no longer on meds  Insomnia- no longer on meds  H/o recurrent UTIs          Medical Decision Making:   I personally reviewed labs: CBC, BMP  I personally reviewed imaging: Chest x-ray  I personally reviewed EKG:  Toxic drug monitoring:   Discussed case with: Nurse, IDR        Code Status: Full  DVT Prophylaxis: Xarelto  GI Prophylaxis:    Subjective:     Chief Complaint / Reason for Physician Visit  \"\".  Discussed with RN events overnight.       Objective:     VITALS:   Last 24hrs VS reviewed since prior progress note. Most recent are:  Patient Vitals for the past 24 hrs:   BP Temp Temp src Pulse Resp SpO2   24 1107 -- -- Oral -- -- --   24 1103 -- -- Oral -- -- --   24 1055 (!) 161/80 -- -- 87 -- --   24 0911 (!) 206/102 -- -- -- -- --   24 0820 (!) 206/102 97.9 °F (36.6 °C) Oral 90 20 99 %   03/15/24 2116 (!) 153/76 99.1 °F (37.3 °C) Oral 76 20 97 %         No intake or output data in the 24 hours ending 24 1326       I had a face to face encounter and 
      Hospitalist Progress Note    NAME:   Laura Akins   : 1929   MRN: [id number]     Date/Time: 3/15/2024 2:01 PM  Patient PCP: Elmer Cobb MD    Estimated discharge date:  Barriers: Brain MRI, medical stability      Assessment / Plan:    Ischemia stroke acute, with left facial droop and word slurring, unknown time of onset    -MRI showed acute right posterior frontal infarct  -Continue aspirin  -PT OT evaluation  -Speech therapy consultation     Maverick on ckd likely 2/2 poor po intake h2o and on diuretics  -Renal function improved  -Hold IV fluid.  -Encourage oral intake        Chronic Conditions:  H/o afib and several cva with residual left sided wk and wheelchair bound at home- home xarelto- continued  OA- not taking meds, has taken prn tylenol- will order prn tylenol  ckd- follows dr llanes outpt, see above  hld- no longer on meds, see above  cad- no longer on meds, sees dr llanes  Gerd-no longer on meds  Insomnia- no longer on meds  H/o recurrent UTIs          Medical Decision Making:   I personally reviewed labs: CBC, BMP  I personally reviewed imaging: Chest x-ray  I personally reviewed EKG:  Toxic drug monitoring:   Discussed case with: Nurse, IDR        Code Status: Full  DVT Prophylaxis: Xarelto  GI Prophylaxis:    Subjective:     Chief Complaint / Reason for Physician Visit  \"\".  Discussed with RN events overnight.       Objective:     VITALS:   Last 24hrs VS reviewed since prior progress note. Most recent are:  Patient Vitals for the past 24 hrs:   BP Temp Temp src Pulse Resp SpO2 Height Weight   03/15/24 1110 (!) 148/64 97.2 °F (36.2 °C) -- 86 20 98 % -- --   03/15/24 0930 (!) 183/83 97.9 °F (36.6 °C) Axillary 87 22 96 % -- --   03/15/24 0415 (!) 154/86 97.5 °F (36.4 °C) Axillary 85 17 99 % -- --   03/15/24 0015 (!) 150/90 98.1 °F (36.7 °C) Axillary 74 15 98 % -- --   24 2045 (!) 168/85 98.1 °F (36.7 °C) Axillary 83 17 97 % -- --   24 1811 (!) 152/76 -- -- 87 -- -- -- -- 
      Hospitalist Progress Note    NAME:   Laura Akins   : 1929   MRN: [id number]     Date/Time: 3/14/2024 5:20 PM  Patient PCP: Elmer Cobb MD    Estimated discharge date:03/15  Barriers: Brain MRI, medical stability      Assessment / Plan:    Ischemia stroke acute, with left facial droop and word slurring, unknown time of onset  -admit observation  -pt, ot, case mgmt consulted, family doesn't want rehab, would be willing for home health pt/ot, etc.  -SLP eval consult ordered  -keep npo, failed bedside swallow eval- asa supp ordered x1  -continue xarelto, daily asa ordered, pt is high risk for bleed as repeated falls reported at home, will defer additional atc to neurology  -neurology consult, teleneuro seen in ER on admit- appreciate expertise  -would consider neurointerventionist with 50% stenosis several vessels- will defer to neurology expertise  -mri brain in am, echo in am- pending  -am labs  -telemetry  -allow for some permissive htn 24 hours  -u/a neg, cxr neg  -lipitor 40mg was on in past- restarted  -cardiac consult ordered- appreciate recs- as pt only taking xarelto no longer on hld/htn/afib meds otherwise     Maverick on ckd likely 2/2 poor po intake h2o and on diuretics  -trend kidney function  -try to avoid nephrotoxic drugs  -sees nephrology dr mario alberto root, would consider consult if worsening  -hold home bumex for now and monitor labs in am, consider resuming if appropriate  -per daughter on 1500ml fluid restriction daily at home  -ivf 50ml/hr x1L        Chronic Conditions:  H/o afib and several cva with residual left sided wk and wheelchair bound at home- home xarelto- continued  OA- not taking meds, has taken prn tylenol- will order prn tylenol  ckd- follows dr mario alberto root, see above  hld- no longer on meds, see above  cad- no longer on meds, sees dr llanes  Gerd-no longer on meds  Insomnia- no longer on meds  H/o recurrent UTIs          Medical Decision Making:   I personally reviewed 
      Hospitalist Progress Note    NAME:   Laura Akins   : 1929   MRN: [id number]     Date/Time: 3/17/2024 2:21 PM  Patient PCP: Elmer Cobb MD    Estimated discharge date:  Barriers: Medical stability, mental status improvement      Assessment / Plan:    Ischemia stroke acute, with left facial droop and word slurring, unknown time of onset    -MRI showed acute right posterior frontal infarct  -Continue aspirin  -PT OT evaluation  -Speech therapy consultation     Maverick on ckd likely 2/2 poor po intake h2o and on diuretics  -Renal function improved  -Hold IV fluid.  -Encourage oral intake        Chronic Conditions:  H/o afib and several cva with residual left sided wk and wheelchair bound at home- home xarelto- continued  OA- not taking meds, has taken prn tylenol- will order prn tylenol  ckd- follows dr llanes outpt, see above  hld- no longer on meds, see above  cad- no longer on meds, sees dr llanes  Gerd-no longer on meds  Insomnia- no longer on meds  H/o recurrent UTIs          Medical Decision Making:   I personally reviewed labs: CBC, BMP  I personally reviewed imaging: Chest x-ray  I personally reviewed EKG:  Toxic drug monitoring:   Discussed case with: Nurse, IDR        Code Status: Full  DVT Prophylaxis: Xarelto  GI Prophylaxis:    Subjective:     Chief Complaint / Reason for Physician Visit  \"\".  Discussed with RN events overnight.       Objective:     VITALS:   Last 24hrs VS reviewed since prior progress note. Most recent are:  Patient Vitals for the past 24 hrs:   BP Temp Temp src Pulse Resp SpO2   24 0758 (!) 186/90 97.7 °F (36.5 °C) Oral 90 14 97 %   24 2349 (!) 163/75 97.7 °F (36.5 °C) Oral 82 20 --   24 (!) 163/75 97.7 °F (36.5 °C) Oral 82 -- 96 %   24 1746 (!) 145/69 -- -- 96 -- 96 %   24 1600 (!) 171/90 -- -- 93 -- --           Intake/Output Summary (Last 24 hours) at 3/17/2024 1421  Last data filed at 3/17/2024 0831  Gross per 24 hour   Intake 
Attempted to schedule hospital follow up PCP appointment. Office  will contact the patient with appointment information per office protocol. Pending patient discharge. Cecelia Voss, Care Management Assistant  
End of Shift Note    Bedside shift change report given to  DAKSHA SAUER (oncoming nurse) by DAKSHA NIÑO  Report included the following information       Shift worked:  Night    Shift summary and any significant changes:    No acute changes done over the night. Q 2 turns done.  Patient given her scheduled medications.  Prn hydrallazine given for her blood pressure once over the night. Patient on mildly thick diet.  NO ICE with meals or meds. Upto 30 minutes.  To seat upright while eating to prevent aspiration.     Concerns for physician to address: None   [facility] phone for oncoming shift:  [phone]     Patient Information  Laura Akins  89 y.o.  3/13/2024  4:13 PM by Yulissa Brooks MD. Laura Akins was admitted from [facility]    Problem List  Patient Active Problem List    Diagnosis Date Noted    HBP (high blood pressure) 01/27/2011    DNR (do not resuscitate) discussion 03/15/2024    Ischemic stroke ([facility]) 03/15/2024    Acute ischemic stroke ([facility]) 03/14/2024    Stroke of unknown cause ([facility]) 03/14/2024    Acute exacerbation of CHF (congestive heart failure) ([facility]) 08/10/2021    Unstable angina ([facility]) 03/13/2016    TIA (transient ischemic attack) 08/31/2015    Sepsis ([facility]) 07/09/2015    Cellulitis 07/09/2015    Pneumonia 04/13/2015    Orthostatic dizziness 04/13/2015    Lower extremity venous stasis 08/01/2013    DNR (do not resuscitate) 07/30/2013    Malnutrition ([facility]) 07/29/2013    Diastolic CHF, acute on chronic ([facility]) 07/29/2013    Dysphagia 07/29/2013    S/P PTCA (percutaneous transluminal coronary angioplasty) 12/03/2011    S/P knee replacement 12/03/2011    DJD (degenerative joint disease) 12/03/2011    Obesity 12/03/2011    A-fib ([facility]) 12/01/2011    Bacterial UTI 02/03/2011    Candidiasis of mouth 02/03/2011    BPPV (benign paroxysmal positional vertigo) 02/01/2011    History of CVA (cerebrovascular accident) 01/26/2011    Vertigo 01/26/2011    Bradycardia 01/26/2011    CAD (coronary artery disease) 01/26/2011 
End of Shift Note    Bedside shift change report given to DAKSHA Barrera (oncoming nurse) by DAKSHA Salinas  Report included the following information       Shift worked:  7p - 7a   Shift summary and any significant changes:    No acute concerns.  Patient turned during the night.      Concerns for physician to address: None   [employer] phone for oncoming shift:  [phone]     Patient Information  Laura Akins  89 y.o.  3/13/2024  4:13 PM by Yulissa Brooks MD. Laura Akins was admitted from [facility]    Problem List  Patient Active Problem List    Diagnosis Date Noted    HBP (high blood pressure) 01/27/2011    DNR (do not resuscitate) discussion 03/15/2024    Ischemic stroke ([facility]) 03/15/2024    Acute ischemic stroke ([facility]) 03/14/2024    Stroke of unknown cause ([facility]) 03/14/2024    Acute exacerbation of CHF (congestive heart failure) ([facility]) 08/10/2021    Unstable angina ([facility]) 03/13/2016    TIA (transient ischemic attack) 08/31/2015    Sepsis ([facility]) 07/09/2015    Cellulitis 07/09/2015    Pneumonia 04/13/2015    Orthostatic dizziness 04/13/2015    Lower extremity venous stasis 08/01/2013    DNR (do not resuscitate) 07/30/2013    Malnutrition ([facility]) 07/29/2013    Diastolic CHF, acute on chronic ([facility]) 07/29/2013    Dysphagia 07/29/2013    S/P PTCA (percutaneous transluminal coronary angioplasty) 12/03/2011    S/P knee replacement 12/03/2011    DJD (degenerative joint disease) 12/03/2011    Obesity 12/03/2011    A-fib ([facility]) 12/01/2011    Bacterial UTI 02/03/2011    Candidiasis of mouth 02/03/2011    BPPV (benign paroxysmal positional vertigo) 02/01/2011    History of CVA (cerebrovascular accident) 01/26/2011    Vertigo 01/26/2011    Bradycardia 01/26/2011    CAD (coronary artery disease) 01/26/2011     Past Medical History:   Diagnosis Date    Arrhythmia     atrial fibrillation 2017    Arthritis     CAD (coronary artery disease)     MI    CKD (chronic kidney disease) stage 3    High cholesterol     Hypertension     Other ill-defined 
End of Shift Note    Bedside shift change report given to DAKSHA Diallo (oncoming nurse) by DAKSHA Barrera  Report included the following information       Shift worked:  7a-7p   Shift summary and any significant changes:    Q2 turns. Hydralazine given x1. Miralax given to assist with BM. Patient will be returning home per family decision. Will need help with obtaining hospital bed.    Concerns for physician to address: None   [employer] phone for oncoming shift:  [phone]     Patient Information  Laura Akins  89 y.o.  3/13/2024  4:13 PM by Yulissa Brooks MD. Laura Akins was admitted from [facility]    Problem List  Patient Active Problem List    Diagnosis Date Noted    HBP (high blood pressure) 01/27/2011    DNR (do not resuscitate) discussion 03/15/2024    Ischemic stroke ([facility]) 03/15/2024    Acute ischemic stroke ([facility]) 03/14/2024    Stroke of unknown cause ([facility]) 03/14/2024    Acute exacerbation of CHF (congestive heart failure) ([facility]) 08/10/2021    Unstable angina ([facility]) 03/13/2016    TIA (transient ischemic attack) 08/31/2015    Sepsis ([facility]) 07/09/2015    Cellulitis 07/09/2015    Pneumonia 04/13/2015    Orthostatic dizziness 04/13/2015    Lower extremity venous stasis 08/01/2013    DNR (do not resuscitate) 07/30/2013    Malnutrition ([facility]) 07/29/2013    Diastolic CHF, acute on chronic ([facility]) 07/29/2013    Dysphagia 07/29/2013    S/P PTCA (percutaneous transluminal coronary angioplasty) 12/03/2011    S/P knee replacement 12/03/2011    DJD (degenerative joint disease) 12/03/2011    Obesity 12/03/2011    A-fib ([facility]) 12/01/2011    Bacterial UTI 02/03/2011    Candidiasis of mouth 02/03/2011    BPPV (benign paroxysmal positional vertigo) 02/01/2011    History of CVA (cerebrovascular accident) 01/26/2011    Vertigo 01/26/2011    Bradycardia 01/26/2011    CAD (coronary artery disease) 01/26/2011     Past Medical History:   Diagnosis Date    Arrhythmia     atrial fibrillation 2017    Arthritis     CAD (coronary artery disease)     MI    
End of Shift Note    Bedside shift change report given to DAKSHA Escalante(oncoming nurse) by DAKSHA Pratt  Report included the following information       Shift worked:  DAYS   Shift summary and any significant changes:    CVA workup.  Failed bedside swallow screen with Speech.  Continues to have moderate aphasia.  Awaiting MRI.  Rectal tylenol given X 1 for generalized pain.  Oral care given.  Turned Q2H.  Wound care came to evaluate, see notes.  Leaving for MRI at change of shift.        Concerns for physician to address:  none   [facility] phone for oncoming shift:   [phone]     Patient Information  Laura Akins  89 y.o.  3/13/2024  4:13 PM by Yulissa Brooks MD. Laura Akins was admitted from [facility]    Problem List  Patient Active Problem List    Diagnosis Date Noted    HBP (high blood pressure) 01/27/2011    Acute ischemic stroke ([facility]) 03/14/2024    Stroke of unknown cause ([facility]) 03/14/2024    Acute exacerbation of CHF (congestive heart failure) ([facility]) 08/10/2021    Unstable angina ([facility]) 03/13/2016    TIA (transient ischemic attack) 08/31/2015    Sepsis ([facility]) 07/09/2015    Cellulitis 07/09/2015    Pneumonia 04/13/2015    Orthostatic dizziness 04/13/2015    Lower extremity venous stasis 08/01/2013    DNR (do not resuscitate) 07/30/2013    Malnutrition ([facility]) 07/29/2013    Diastolic CHF, acute on chronic ([facility]) 07/29/2013    Dysphagia 07/29/2013    S/P PTCA (percutaneous transluminal coronary angioplasty) 12/03/2011    S/P knee replacement 12/03/2011    DJD (degenerative joint disease) 12/03/2011    Obesity 12/03/2011    A-fib ([facility]) 12/01/2011    Bacterial UTI 02/03/2011    Candidiasis of mouth 02/03/2011    BPPV (benign paroxysmal positional vertigo) 02/01/2011    History of CVA (cerebrovascular accident) 01/26/2011    Vertigo 01/26/2011    Bradycardia 01/26/2011    CAD (coronary artery disease) 01/26/2011     Past Medical History:   Diagnosis Date    Arrhythmia     atrial fibrillation 2017    Arthritis     CAD (coronary 
End of Shift Note    Bedside shift change report given to DAKSHA Salinas (oncoming nurse) by DAKSHA Pratt  Report included the following information       Shift worked:  Days   Shift summary and any significant changes:    CVA workup.  Completed Bedside FEES test with speech, ok for pureed diet, and mildly thick liquids.  1 packet of thickener per 4oz of liquid.  Continues to have moderate aphasia.  Pills crushed in applesauce with small bites and prompted to swallow with no difficulties.  Patient placed on 1L/NC for comfort per family wishes for snoring.  Ok with MD.  Patient confirmed with NP she wishes to be DNR while nurse was in the room.  IV Hydralazine X 1.      Concerns for physician to address:  Speak with family about care goals/plan going forth.   [employer] phone for oncoming shift:        Patient Information  Laura Akins  89 y.o.  3/13/2024  4:13 PM by Yulissa Brooks MD. Larua Akins was admitted from [facility]    Problem List  Patient Active Problem List    Diagnosis Date Noted    HBP (high blood pressure) 01/27/2011    Acute ischemic stroke ([facility]) 03/14/2024    Stroke of unknown cause ([facility]) 03/14/2024    Acute exacerbation of CHF (congestive heart failure) ([facility]) 08/10/2021    Unstable angina ([facility]) 03/13/2016    TIA (transient ischemic attack) 08/31/2015    Sepsis ([facility]) 07/09/2015    Cellulitis 07/09/2015    Pneumonia 04/13/2015    Orthostatic dizziness 04/13/2015    Lower extremity venous stasis 08/01/2013    DNR (do not resuscitate) 07/30/2013    Malnutrition ([facility]) 07/29/2013    Diastolic CHF, acute on chronic ([facility]) 07/29/2013    Dysphagia 07/29/2013    S/P PTCA (percutaneous transluminal coronary angioplasty) 12/03/2011    S/P knee replacement 12/03/2011    DJD (degenerative joint disease) 12/03/2011    Obesity 12/03/2011    A-fib ([facility]) 12/01/2011    Bacterial UTI 02/03/2011    Candidiasis of mouth 02/03/2011    BPPV (benign paroxysmal positional vertigo) 02/01/2011    History of CVA (cerebrovascular 
End of Shift Note    Bedside shift change report given to DAKSHA Vergara (oncoming nurse) by DAKSHA Barrera  Report included the following information       Shift worked:  7a - 7p   Shift summary and any significant changes:    D5NS stopped this morning. Patient placed on NS. Q2 turns. IV hydralazine given twice. Cues needed for swallowing. Intermittently pockets food.    Concerns for physician to address: None   [facility] phone for oncoming shift:  [phone]     Patient Information  Laura Akins  89 y.o.  3/13/2024  4:13 PM by Yulissa Brooks MD. Laura Akins was admitted from [facility]    Problem List  Patient Active Problem List    Diagnosis Date Noted    HBP (high blood pressure) 01/27/2011    DNR (do not resuscitate) discussion 03/15/2024    Ischemic stroke ([facility]) 03/15/2024    Acute ischemic stroke ([facility]) 03/14/2024    Stroke of unknown cause ([facility]) 03/14/2024    Acute exacerbation of CHF (congestive heart failure) ([facility]) 08/10/2021    Unstable angina ([facility]) 03/13/2016    TIA (transient ischemic attack) 08/31/2015    Sepsis ([facility]) 07/09/2015    Cellulitis 07/09/2015    Pneumonia 04/13/2015    Orthostatic dizziness 04/13/2015    Lower extremity venous stasis 08/01/2013    DNR (do not resuscitate) 07/30/2013    Malnutrition ([facility]) 07/29/2013    Diastolic CHF, acute on chronic ([facility]) 07/29/2013    Dysphagia 07/29/2013    S/P PTCA (percutaneous transluminal coronary angioplasty) 12/03/2011    S/P knee replacement 12/03/2011    DJD (degenerative joint disease) 12/03/2011    Obesity 12/03/2011    A-fib ([facility]) 12/01/2011    Bacterial UTI 02/03/2011    Candidiasis of mouth 02/03/2011    BPPV (benign paroxysmal positional vertigo) 02/01/2011    History of CVA (cerebrovascular accident) 01/26/2011    Vertigo 01/26/2011    Bradycardia 01/26/2011    CAD (coronary artery disease) 01/26/2011     Past Medical History:   Diagnosis Date    Arrhythmia     atrial fibrillation 2017    Arthritis     CAD (coronary artery disease)     MI    CKD (chronic 
End of Shift Note    Bedside shift change report given to DAKSHA espino(oncoming nurse) by DAKSHA Escalante  Report included the following information       Shift worked:  nights   Shift summary and any significant changes:    CVA workup.  Failed bedside swallow screen with Speech.  Speech to continue tyo evaluate and work  with pt.  Continues to have moderate aphasia. Meticulous oral care completed,  MRI complete, positive for acute infarct.  Turned Q2H. Leaving for MRI at change of shift.        Concerns for physician to address:  On xarelto, unable to swallow pills, do we need to incorporate alternative medication possibly heparin in setting of elevated creatinine?  Also alternative nutrition if continues to be unable to tolerate po intake?  Aggressive versus conservative treatment?  Family discussion about care goals and expectations.  Thank you.   Zone phone for oncoming shift:   [phone]     Patient Information  Laura Akins  89 y.o.  3/13/2024  4:13 PM by Yulissa Brooks MD. Laura Akins was admitted from [facility]    Problem List  Patient Active Problem List    Diagnosis Date Noted    HBP (high blood pressure) 01/27/2011    Acute ischemic stroke ([facility]) 03/14/2024    Stroke of unknown cause ([facility]) 03/14/2024    Acute exacerbation of CHF (congestive heart failure) ([facility]) 08/10/2021    Unstable angina ([facility]) 03/13/2016    TIA (transient ischemic attack) 08/31/2015    Sepsis ([facility]) 07/09/2015    Cellulitis 07/09/2015    Pneumonia 04/13/2015    Orthostatic dizziness 04/13/2015    Lower extremity venous stasis 08/01/2013    DNR (do not resuscitate) 07/30/2013    Malnutrition ([facility]) 07/29/2013    Diastolic CHF, acute on chronic ([facility]) 07/29/2013    Dysphagia 07/29/2013    S/P PTCA (percutaneous transluminal coronary angioplasty) 12/03/2011    S/P knee replacement 12/03/2011    DJD (degenerative joint disease) 12/03/2011    Obesity 12/03/2011    A-fib ([facility]) 12/01/2011    Bacterial UTI 02/03/2011    Candidiasis of mouth 
End of Shift Note    Bedside shift change report given to Natalie RN (oncoming nurse) by January Cuello RN  Report included the following information       Shift worked:  7a - 7p   Shift summary and any significant changes:    No acute changes. Patient drowsy this morning. Q2 turns completed. Hospice consult completed, patient is going home with hospice tomorrow morning at 10 am with [facility].     Concerns for physician to address: None   Zone phone for oncoming shift:  [phone]     Patient Information  Laura Akins  89 y.o.  3/13/2024  4:13 PM by Yulissa Brooks MD. Laura Akins was admitted from [facility]    Problem List  Patient Active Problem List    Diagnosis Date Noted    HBP (high blood pressure) 01/27/2011    DNR (do not resuscitate) discussion 03/15/2024    Ischemic stroke ([facility]) 03/15/2024    Acute ischemic stroke ([facility]) 03/14/2024    Stroke of unknown cause ([facility]) 03/14/2024    Acute exacerbation of CHF (congestive heart failure) ([facility]) 08/10/2021    Unstable angina ([facility]) 03/13/2016    TIA (transient ischemic attack) 08/31/2015    Sepsis ([facility]) 07/09/2015    Cellulitis 07/09/2015    Pneumonia 04/13/2015    Orthostatic dizziness 04/13/2015    Lower extremity venous stasis 08/01/2013    DNR (do not resuscitate) 07/30/2013    Malnutrition ([facility]) 07/29/2013    Diastolic CHF, acute on chronic ([facility]) 07/29/2013    Dysphagia 07/29/2013    S/P PTCA (percutaneous transluminal coronary angioplasty) 12/03/2011    S/P knee replacement 12/03/2011    DJD (degenerative joint disease) 12/03/2011    Obesity 12/03/2011    A-fib ([facility]) 12/01/2011    Bacterial UTI 02/03/2011    Candidiasis of mouth 02/03/2011    BPPV (benign paroxysmal positional vertigo) 02/01/2011    History of CVA (cerebrovascular accident) 01/26/2011    Vertigo 01/26/2011    Bradycardia 01/26/2011    CAD (coronary artery disease) 01/26/2011     Past Medical History:   Diagnosis Date    Arrhythmia     atrial fibrillation 2017    Arthritis     CAD (coronary 
End of Shift Note    Bedside shift change report given to Santa CROOKS(oncoming nurse) by DAKSHA Escalante  Report included the following information       Shift worked:  nights   Shift summary and any significant changes:     New admit.  CVA workup.  Failed bedside swallow screen.  Awaiting ST consult.  Continues to have moderate aphasia.No acute events this shift.       Concerns for physician to address:  none   [facility] phone for oncoming shift:   [phone]     Patient Information  Laura Akins  89 y.o.  3/13/2024  4:13 PM by Valeriano Jorgensen DO. Laura Akins was admitted from [facility]    Problem List  Patient Active Problem List    Diagnosis Date Noted    HBP (high blood pressure) 01/27/2011    Acute ischemic stroke ([facility]) 03/14/2024    Acute exacerbation of CHF (congestive heart failure) ([facility]) 08/10/2021    Unstable angina ([facility]) 03/13/2016    TIA (transient ischemic attack) 08/31/2015    Sepsis ([facility]) 07/09/2015    Cellulitis 07/09/2015    Pneumonia 04/13/2015    Orthostatic dizziness 04/13/2015    Lower extremity venous stasis 08/01/2013    DNR (do not resuscitate) 07/30/2013    Malnutrition ([facility]) 07/29/2013    Diastolic CHF, acute on chronic ([facility]) 07/29/2013    Dysphagia 07/29/2013    S/P PTCA (percutaneous transluminal coronary angioplasty) 12/03/2011    S/P knee replacement 12/03/2011    DJD (degenerative joint disease) 12/03/2011    Obesity 12/03/2011    A-fib ([facility]) 12/01/2011    Bacterial UTI 02/03/2011    Candidiasis of mouth 02/03/2011    BPPV (benign paroxysmal positional vertigo) 02/01/2011    History of CVA (cerebrovascular accident) 01/26/2011    Vertigo 01/26/2011    Bradycardia 01/26/2011    CAD (coronary artery disease) 01/26/2011     Past Medical History:   Diagnosis Date    Arrhythmia     atrial fibrillation 2017    Arthritis     CAD (coronary artery disease)     MI    CKD (chronic kidney disease) stage 3    High cholesterol     Hypertension     Other ill-defined conditions(799.89)     high 
Hospice Liaison Note: consult received. Reviewing chart. Will follow up shortly.   
[facility]  Good Help to Those in Need  [phone]    Patient Name: Laura Akins  YOB: 1929  Age: 89 y.o.    [facility] RN Note:  Hospice consult noted. Chart reviewed. Plan of care discussed with patients nurse & care manager.   In to meet with Leonila Perkins and Sarah lr who lives with pt and provides care. Discussed Hospice philosophy, general plan of care, levels of care, services and on call procedures.  Family information packet provided & reviewed with family.    After multiple discussions today, both daughters are on board with pt discharging tomorrow w/ hospice support. Consents signed by dtkevyn/NOLAN Perkins at bedside and scanned. Verbal CTI for the hospice diagnosis of CVA per Dr. Rivas.     Admission time will be 11AM tomorrow, CM to request 10AM transport.       Full pre-admission note to follow.     Thank you for the opportunity to be of service to this patient.    
[facility]  Good Help to Those in Need  [phone]    Hospice Nursing PRE-Admission   Discharge Summary  Patient Name: Laura Akins  YOB: 1929  Age: 89 y.o.       Date of PLANNED Hospice Admission: Tuesday at 11AM  Hospice Attending: Dr. Bulmaro Rivas  Primary Care Physician: Elmer Cobb MD     Home Hospice Address:  [address] [place] [address]     Primary Contact and Phone:    Dtr/pcg Sarahlacey Mulligan [phone]     ADVANCE CARE PLANNING    Code Status: DNR  Durable DNR: X Yes        3/18/2024     9:02 AM   Demographics   Marital Status        Zoroastrian: Adventism   Home: not discussed    HOSPICE SUMMARY     Verbal CTI of terminal diagnosis with life expectancy of 6 months or less received from: Dr. Bulmaro Rivas    For the Hospice Diagnosis of: CVA    NCD: discuss on admission      CLINICAL INFORMATION   Allergies:   Allergies   Allergen Reactions    Levofloxacin In D5w Anxiety and Hives    Gabapentin Other (See Comments)     Decreased motor function          Currently this patient has:     X peripheral IV             Does patient have an AICD device:  [] Yes     [x] No    Has ICD been deactivated?           N/A        COVID Screening: not tested on this admission      ASSESSMENT & PLAN     1. Symptom Issues Identified: weakness, bed-bound, lethargic, not eating much, dysphagia (may be 0-7)    2. Spiritual Issues  Identified: none identified at this time    3.  Psych/ Social/ Emotional Issues Identified: pt has 2 dtr's Maddie and Sarah, she lives w/Sarah who provides all of her care. Family wants to get her home w/ hospice. They understand she may be 0-7 and just want to keep her comfortable.              CARE COORDINATION           Hospice Consents: signed by dtr/NOLAN Perkins and cruzito    2. DME Ordered/Company/Delivery Plan: bed w/ rails, OBT, gel over-lay delivered today      3.   Unique home needs for safety: Bariatric patient  NO    4. Symptom 
Neurology    March 15, 2024    The patient's MRI of the brain does show an acute right frontal lobe infarct.  This is despite being on anticoagulation and an antiplatelet agent along with a statin in the face of her atrial fibrillation.  She does have evidence of significant bilateral internal carotid artery disease and a vascular surgery consultation would be reasonable.  It is difficult to tell with any absolute certainty whether or not her acute stroke is symptomatic of her atrial fibrillation or her right internal carotid artery disease.    Flaquito Greene., M.D.    Diplomate, American Board of Neurology and Psychiatry  Diplomate, American Board of Electrodiagnostic Medicine  Subspecialty Certification, Headache Medicine, [facility]    
Occupational Therapy     Order received and chart reviewed. Pt sleeping soundly. Sister present and reports pt just got to sleep around 4am. Several attempts to wake pt, but unsuccessful. Pt too sleepy to participate in OT eval at this time. Will defer and continue to follow for OT eval.    Ariana Lassiter, OT    
Occupational Therapy    Chart reviewed. Attempted to see patient. Patient briefly opened her eyes and moaned before falling back to sleep. Pt very lethargic, unable to participate in therapy. Patient's daughter present, she informed therapist that she has not been staying awake unless the daughter is constantly being loud; even then the patient goes back to sleep. During that short awake period, the daughter is feeding the patient some thickened liquids with medications; approximately 3 bites before the patient goes to sleep. Family is considering the pt to go home on hospice. Will defer this session due to lethargy and continue to follow.    Ariana Lassiter OT  Total Time: 18 minutes  
On examination pt bilateral eyes have thick, sticky yellowish discharge, pt reported left eye is itching, no erythema or edema, having difficulty to open the left eye. Also reported left eye is tearing up a lot, pt states this started a few days ago. Pt gets eye injections in the left eye for macular degeneration last one was 2 weeks ago reported by daughter. No erythema to sclera. No pain on examination. Will order luis-poly tid ointment for conjunctivitis as d/w pt and daughters.    
PT Note    10:35am  Order received and chart reviewed.  Pt sleeping soundly.  Sister present and reports pt just got to sleep around 4am.  Several attempts to wake pt, but unsuccessful. Pt too sleepy to participate in PT eval at this time.  Will defer and continue to follow    History obtained from Sister.  Pt using w/c as primary mode of transportation x 1 yr.  Sleeps in lift chair and able to transfer Irma w/c<>lift chair, roll to the bathroom and transfer Irma w/c<>toilet.      14:05pm  Second attempt to see pt and she is still sleeping soundly.  Both daughters present stating she hasn't waken up today.  Will defer and follow up with pt tomorrow.   
Patient going to hospice at home. Family at bedside, quad here to  patient. Gown changed by PCT. Patient resting and comfortable.   
          Intake/Output Summary (Last 24 hours) at 3/18/2024 1540  Last data filed at 3/18/2024 0624  Gross per 24 hour   Intake 200 ml   Output 300 ml   Net -100 ml          I had a face to face encounter and independently examined this patient on 3/18/2024, as outlined below:  PHYSICAL EXAM:  General: Alert, cooperative  EENT:  EOMI. Anicteric sclerae.  Resp:  CTA bilaterally, no wheezing or rales.  No accessory muscle use  CV:  Regular  rhythm,  No edema  GI:  Soft, Non distended, Non tender.  +Bowel sounds  Neurologic:  Alert and oriented X 3, normal speech,   Psych:   Good insight. Not anxious nor agitated  Skin:  No rashes.  No jaundice    Reviewed most current lab test results and cultures  YES  Reviewed most current radiology test results   YES  Review and summation of old records today    NO  Reviewed patient's current orders and MAR    YES  PMH/SH reviewed - no change compared to H&P    Procedures: see electronic medical records for all procedures/Xrays and details which were not copied into this note but were reviewed prior to creation of Plan.      LABS:  I reviewed today's most current labs and imaging studies.  Pertinent labs include:  Recent Labs     03/16/24  0010   WBC 11.1*   HGB 11.6   HCT 37.7          Recent Labs     03/16/24  0010 03/17/24  0017    144   K 3.9 3.7   * 117*   CO2 25 22   GLUCOSE 114* 131*   BUN 22* 21*   CREATININE 1.16* 0.98   CALCIUM 9.5 9.5         Signed: Yulissa Brooks MD         
    Past Medical History:   Diagnosis Date    Arrhythmia     atrial fibrillation 2017    Arthritis     CAD (coronary artery disease)     MI    CKD (chronic kidney disease) stage 3    High cholesterol     Hypertension     Other ill-defined conditions(799.89)     high cholesterol    Stroke (HCC)        Core Measures:  CVA: Yes:Yes      Activity:  Activity: In bed  Number times ambulated in hallways past shift: 0  Number of times OOB to chair past shift: 0  Cardiac:   Cardiac Monitoring: y Afib  y    Access:   Current line(s):PIV    Genitourinary:   Urinary status: voiding continent, external cath      Respiratory:   O2 Device: None (Room air)          GI:  Last BM (including prior to admit): 03/13/24  Current diet:  ADULT DIET; Dysphagia - Pureed; Mildly Thick (Nectar)    Tolerating current diet: n/a       Pain Management:   Patient states pain is manageable on current regimen: n/a    Skin:  Jeffrey Scale Score: 13  Interventions: {    Patient Safety:  Fall Score: Huerta Total Score: 60  Interventions: bed/chair alarm       DVT prophylaxis:  DVT prophylaxis Med-lovenox      Wounds: (If Applicable)  Wounds-see LDA      Active Consults:  IP CONSULT TO HOSPITALIST  IP CONSULT TO NEUROLOGY  IP CONSULT TO CARDIOLOGY  IP WOUND CARE NURSE CONSULT TO EVAL  IP CONSULT TO HOSPICE    Length of Stay:  Expected LOS: 3  Actual LOS: 2  Discharge Plan: [facility] Yousuf Avila RN                                  
conditions(799.89)     high cholesterol    Stroke (HCC)        Core Measures:  CVA: Yes:Yes      Activity:  Activity: In bed  Number times ambulated in hallways past shift: 0  Number of times OOB to chair past shift: 0  Cardiac:   Cardiac Monitoring: y Afib  y    Access:   Current line(s):PIV    Genitourinary:   Urinary status: voiding continent, external cath      Respiratory:   O2 Device: None (Room air)          GI:  Last BM (including prior to admit): 03/13/24  Current diet:  ADULT DIET; Dysphagia - Pureed; Mildly Thick (Nectar)    Tolerating current diet: n/a       Pain Management:   Patient states pain is manageable on current regimen: n/a    Skin:  Jeffrey Scale Score: 14  Interventions: {    Patient Safety:  Fall Score: Huerta Total Score: 60  Interventions: bed/chair alarm       DVT prophylaxis:  DVT prophylaxis Med-lovenox      Wounds: (If Applicable)  Wounds-see LDA      Active Consults:  IP CONSULT TO HOSPITALIST  IP CONSULT TO NEUROLOGY  IP CONSULT TO CARDIOLOGY  IP WOUND CARE NURSE CONSULT TO EVAL    Length of Stay:  Expected LOS: 3  Actual LOS: 2  Discharge Plan: [facility]    Rita Avila RN                                  
high cholesterol    Stroke (HCC)        Core Measures:  CVA: Yes      Activity:  Activity: In bed  Number times ambulated in hallways past shift: 0  Number of times OOB to chair past shift: 0  Cardiac:   Cardiac Monitoring: y Afib  y    Access:   Current line(s):PIV    Genitourinary:   Urinary status: voiding continent, external cath      Respiratory:   O2 Device: None (Room air)          GI:  Last BM (including prior to admit): 03/12/24  Current diet:  Diet NPO    Tolerating current diet: n/a       Pain Management:   Patient states pain is manageable on current regimen: n/a    Skin:  Jeffrey Scale Score: 11  Interventions: {    Patient Safety:  Fall Score: Huerta Total Score: 60  Interventions: bed/chair alarm       DVT prophylaxis:  DVT prophylaxis Med-lovenox      Wounds: (If Applicable)  Wounds-see LDA      Active Consults:  IP CONSULT TO HOSPITALIST  IP CONSULT TO NEUROLOGY  IP CONSULT TO CARDIOLOGY  IP WOUND CARE NURSE CONSULT TO EVAL    Length of Stay:  Expected LOS: 3  Actual LOS: 1  Discharge Plan: In progress    DAKSHA Pratt

## 2024-03-19 NOTE — DISCHARGE SUMMARY
Discharge Summary    Name: Laura Akins  [id number]  YOB: 1929 (Age: 89 y.o.)   Date of Admission: 3/13/2024  Date of Discharge: 3/19/2024  Attending Physician: Yulissa Brooks MD    Discharge Diagnosis:   Ischemia stroke acute, with left facial droop and word slurring, unknown time of onset     [place] on ckd likely 2/2 poor po intake h2o and on diuretics   H/o afib and several cva with residual left sided wk and wheelchair bound at home- home xarelto- continued  OA- not taking meds, has taken prn tylenol- will order prn tylenol  ckd- follows dr llanes outpt, see above  hld- no longer on meds, see above  cad- no longer on meds, sees dr llanes  Gerd-no longer on meds  Insomnia- no longer on meds  H/o recurrent UTIs  Consultations:  IP CONSULT TO HOSPITALIST  IP CONSULT TO NEUROLOGY  IP CONSULT TO CARDIOLOGY  IP WOUND CARE NURSE CONSULT TO EVAL  IP CONSULT TO HOSPICE      Brief Admission History/Reason for Admission Per Yulissa Brooks MD:   Laura Akins is a 89 y.o.  female with PMHx significant for afib, h/o several cva w/left sided residual in the past and wheelchair bound, oa, ckd, hld, cad, gerd, insomnia, h/o recurrent utis, h/o recurrent falls at home. Pt on xarelto- continued. Came to ER via EMS. I d/w both daughters, daughter eugenia lives with, is retired nurse, gave me list of meds takes at home. States all the appropriate modifications for wheelchair already in place at home. Pt at home can pivot to bed and toilet from wheelchair. Last fall reported December. No recent fall reported. Pt able to speak and a+ox4, however very slurred speech and is difficult to understand, she does have noted left sided facial droop. I am unable to obtain from either daughter or pt the exact time last known pt normal baseline. Teleneuro seen while pt in the ER- appreciate RECs. On exam pt has left sided arm and leg weakness, but daughter at bedside Maddie tells me that that

## 2024-03-19 NOTE — DISCHARGE INSTRUCTIONS
Stroke: Care Instructions  Overview     A stroke is damage to the brain that occurs when a blood vessel in the brain bursts or is blocked by a blood clot. Without blood and the oxygen it carries, part of the brain is damaged. The part of your body controlled by that part of your brain may not function properly now.  The brain is an amazing organ that can heal itself to some degree. The stroke you had damaged part of your brain. But other parts of your brain may take over in some way for the damaged areas.  Your doctor will talk with you about what you can do to prevent another stroke. You can help by managing other health problems that raise your risk, such as atrial fibrillation or high blood pressure. Have a heart-healthy lifestyle which includes being active, eating healthy foods, staying at a healthy weight, and not smoking. You may also take medicine that prevents blood clots.  Enter a stroke rehabilitation (rehab) program if your doctor recommends it. Stroke rehab is training and therapy to help you recover, prevent problems, and relearn how to do everyday things you have not been able to do since your stroke. The focus will depend on how the stroke has affected your ability to do the things you want and need to do.  Follow-up care is a key part of your treatment and safety. Be sure to make and go to all appointments, and call your doctor if you are having problems. It's also a good idea to know your test results and keep a list of the medicines you take.  How can you care for yourself at home?    Attend stroke rehabilitation (rehab) if your doctor recommends it. Your rehab plan will be based on your goals and how the stroke affected you.     You will get instructions on how to manage specific problems that you might have because of the stroke.     Manage other health problems that raise your risk of another stroke. These include atrial fibrillation, diabetes, high blood pressure, and high cholesterol.

## 2024-03-20 ENCOUNTER — HOME CARE VISIT (OUTPATIENT)
Facility: HOME HEALTH | Age: 89
End: 2024-03-20
Payer: MEDICARE

## 2024-03-20 VITALS
OXYGEN SATURATION: 96 % | SYSTOLIC BLOOD PRESSURE: 180 MMHG | TEMPERATURE: 98.2 F | DIASTOLIC BLOOD PRESSURE: 108 MMHG | RESPIRATION RATE: 22 BRPM | HEART RATE: 88 BPM

## 2024-03-20 PROCEDURE — G0156 HHCP-SVS OF AIDE,EA 15 MIN: HCPCS

## 2024-03-20 PROCEDURE — 0651 HSPC ROUTINE HOME CARE

## 2024-03-20 PROCEDURE — G0299 HHS/HOSPICE OF RN EA 15 MIN: HCPCS

## 2024-03-20 PROCEDURE — G0155 HHCP-SVS OF CSW,EA 15 MIN: HCPCS

## 2024-03-20 ASSESSMENT — ENCOUNTER SYMPTOMS
CONTUSION: 1
COUGH CHARACTERISTICS: MOIST
BOWEL INCONTINENCE: 1
PAIN LOCATION - PAIN QUALITY: UNK
COUGH: 1

## 2024-03-20 NOTE — HOSPICE
Patient was lying in bed awake. She would attempt to answer questions but her speech can not be understood except sometimes by her daughter, Sarah. Daughter stated the patient woke up several times last night, moaning, but \"was not in pain\". Patient occasionally moaned. Sarah stated that patient has a sore throat. Patient has multiple bruising on both arms and hands. She has 2-3+ non-pitting edema in all extremities. Sarah stated the patient also c/o irritation where the urinary cath is inserted. Examined area. Placement of catheter appears to be correct.  Catheter is draining dark, red urine into the Das bag. Sarah stated she dumped about 1/2 a of urine bag earlier. She stated the blood in the urine is new. Expained that it may be due to the trauma of the insertion yesterday. Secure device placed on catheter tubing.. No open areas of skin seen. Sarah stated patient had a big BM that was diarrhea yesterday after the admission nurse left. Other daughter, Maddie, arrived later in the visit and was concerned about patient aspirating fluids. Patient did choke after drinking water during the visit. Sarah stated she had some thickener that she could use. Patient's BP was 180/108. Austin Castro NP emailed to see if patient needed to start on antihypertensives. After looking through her chart, Austin and Dr. Alcantar decided to not start antihypertensives at this time. Sarah encouraged to try giving the patient some lorazepam this evening to help her sleep and calm her down.   Supplies ordered

## 2024-03-20 NOTE — HOSPICE
Parkinson's)  ________  F. Renal failure  ________  G. Liver Disease  ________  H. Neoplasia  ________  I. Acquired immune deficiency syndrome  ________  J. Dementia          SPIRITUAL/Social/Emotional/psych: Kyra, daughter is primary CG.  many family members in home     Dr. Rivas_____ contacted, agrees to serve as attending provider for hospice and provided verbal certification of terminal illness with prognosis of 6 months or less life expectancy. Orders for hospice admission, medications and plan of treatment received. Medication reconciliation completed.        Currently this patient has:          Das Catheter:     yes          MEDS:  I have reviewed the patient's medication list with MD and identified the following:  Nonformulary medications: xarelto  Unrelated medications: none     IDT communication to include MD, SN, SW, CH and support team.

## 2024-03-21 LAB
BACTERIA SPEC CULT: ABNORMAL
CC UR VC: ABNORMAL
SERVICE CMNT-IMP: ABNORMAL

## 2024-03-21 PROCEDURE — 0651 HSPC ROUTINE HOME CARE

## 2024-03-21 NOTE — HOSPICE
LMSW arrived at the patient's home to complete a Psychosocial Initial Assessment visit for [facility]. The LMSW was greeted at the side door by the patient's DTR Lilly, and another DTR Maddie was present. The home was clean and clutter free. The patient was a 89 y/o  female with a [facility] Dx. Cerebrovascular accident (CVA), unspecified mechanism. LMSW explained the role of the MSW with hospice services. The patient was received sleeping on her bed comfortably throughout this visit. Maddie and Lilly were the POC. Lilly reported that the patient's appetite was poor. Patient did not sleep the night before this visit. Patient was bedbound and total care. The patient's mood was said to be good. Patient was able to do for herself before this hospitalization. Patient was said to be alert and oriented x 3-4. The patient worked as a  with [employer] for five years. Patient drove a school bus for 30 years. Patient raised five children: Maddie who lives in [place] but will remain in [place] Until the patient passes, Grayson who lives in [place], Elen who lives in [place], Beba who passed in 2008, and Lilly who resides with the patient for the last 15 years. Patient does not have any siblings. Patient belonged to the [facility] but has not attended in years. LMSW provided emotional support and supportive counseling related to EOL care.

## 2024-03-22 ENCOUNTER — HOME CARE VISIT (OUTPATIENT)
Facility: HOME HEALTH | Age: 89
End: 2024-03-22
Payer: MEDICARE

## 2024-03-22 VITALS
DIASTOLIC BLOOD PRESSURE: 110 MMHG | SYSTOLIC BLOOD PRESSURE: 180 MMHG | RESPIRATION RATE: 22 BRPM | OXYGEN SATURATION: 96 % | HEART RATE: 80 BPM | TEMPERATURE: 98.1 F

## 2024-03-22 PROCEDURE — G0299 HHS/HOSPICE OF RN EA 15 MIN: HCPCS

## 2024-03-22 PROCEDURE — 0651 HSPC ROUTINE HOME CARE

## 2024-03-22 RX ADMIN — HYDROMORPHONE HYDROCHLORIDE 1 MG: 1 SOLUTION ORAL at 13:30

## 2024-03-22 ASSESSMENT — ENCOUNTER SYMPTOMS: PAIN LOCATION - PAIN QUALITY: UNK

## 2024-03-22 NOTE — HOSPICE
Patient was very lethargic. She would open her eyes to touch and try to say words, but they could not be understood. She frequently grimaced and moaned. Daughter, Sarah, stated that the patient c/o a sore throat. Sarah stated the patient had an upper endoscopy in the hospital. She stated the patient was sleeping more during the day and was awake part of the night groaning. Sarah stated she gave the patient a dose of lorazepam last night at bedtime but she still woke up several times. Due to the frequent groaning and grimacing, the patient was given a dose of hydromorphone. K meds discussed briefly with Sarah. She was encouraged to give the patient a PRN dose of hydromorphone at night if the patient is uncomfortable. Approx 1500ml of bloody urine emptied out of the catheter bag. Hazy yellow urine was in the tubing. V.S. 180/110; O2 sat 96% RA; pulse 80 irreg.; resp 22; temp 98.1

## 2024-03-23 ENCOUNTER — HOME CARE VISIT (OUTPATIENT)
Age: 89
End: 2024-03-23
Payer: MEDICARE

## 2024-03-23 PROCEDURE — 0651 HSPC ROUTINE HOME CARE

## 2024-03-24 PROCEDURE — 0651 HSPC ROUTINE HOME CARE

## 2024-03-25 PROCEDURE — 0651 HSPC ROUTINE HOME CARE

## 2024-03-26 ENCOUNTER — HOME CARE VISIT (OUTPATIENT)
Facility: HOME HEALTH | Age: 89
End: 2024-03-26
Payer: MEDICARE

## 2024-03-26 PROCEDURE — 0651 HSPC ROUTINE HOME CARE

## 2024-03-26 PROCEDURE — G0156 HHCP-SVS OF AIDE,EA 15 MIN: HCPCS

## 2024-03-26 PROCEDURE — G0299 HHS/HOSPICE OF RN EA 15 MIN: HCPCS

## 2024-03-26 NOTE — HOSPICE
missed visit, will contact family the week of 3/25/24 to offer support and assess needs. Statement Selected

## 2024-03-27 VITALS
OXYGEN SATURATION: 96 % | HEART RATE: 70 BPM | DIASTOLIC BLOOD PRESSURE: 94 MMHG | SYSTOLIC BLOOD PRESSURE: 158 MMHG | TEMPERATURE: 98.6 F | RESPIRATION RATE: 20 BRPM

## 2024-03-27 PROCEDURE — 0651 HSPC ROUTINE HOME CARE

## 2024-03-27 ASSESSMENT — ENCOUNTER SYMPTOMS
PAIN LOCATION - PAIN QUALITY: UNK
BOWEL INCONTINENCE: 1
CONTUSION: 1

## 2024-03-27 NOTE — HOSPICE
Patient was in bed, sleeping. She awakened easily to touch but fell back asleep quickly. She grimaced and moaned with any touch or movement. Last pain medication was over 12 hours ago. Patient given hydromorphone 1mg because the hospice aide was coming to give her a bath. Patient had hazy, yellow urine in her catheter bag. Patient rolled to look at sacrum. No redness or open areas of skin seen. Daughter, Sarah, stated she had been sleeping more and eating less. She stated that the patient sometimes pockets food. Her last BM was today. V.S. 158/94; O2 sat 96% RA; pulse 68 irreg.; resp 20 irreg.; temp 98.6

## 2024-03-28 PROCEDURE — 0651 HSPC ROUTINE HOME CARE

## 2024-03-29 ENCOUNTER — HOME CARE VISIT (OUTPATIENT)
Facility: HOME HEALTH | Age: 89
End: 2024-03-29
Payer: MEDICARE

## 2024-03-29 VITALS
TEMPERATURE: 97.6 F | OXYGEN SATURATION: 95 % | DIASTOLIC BLOOD PRESSURE: 98 MMHG | RESPIRATION RATE: 20 BRPM | HEART RATE: 80 BPM | SYSTOLIC BLOOD PRESSURE: 158 MMHG

## 2024-03-29 PROCEDURE — 0651 HSPC ROUTINE HOME CARE

## 2024-03-29 PROCEDURE — 2500000001 HSPC NON INJECTABLE MED

## 2024-03-29 PROCEDURE — G0299 HHS/HOSPICE OF RN EA 15 MIN: HCPCS

## 2024-03-29 NOTE — HOSPICE
Patient asleep in bed. She opened her eyes and tried to talk when her blood pressure was taken. Then she fell right back asleep. Last dose of pain medication was 7 hours ago. She did not appear to be in any pain. Her upper lung fields were clear bilat. Daughter, Sarah, stated the patient ate applesauce and yogurt for breakfast. She is still drinking some fluid. Her urine output has been less. Her urinary catheter has hazy memo urine with sediment. The urine has a strong odor. Sarah stated the patiet is sleeping much more. V.S. 158/98; pulse 80 irreg.; resp 20; O2 sat 95% RA; temp 97.63  Hydromorphone liquid ordered

## 2024-03-30 PROCEDURE — 0651 HSPC ROUTINE HOME CARE

## 2024-03-31 PROCEDURE — 0651 HSPC ROUTINE HOME CARE

## 2024-04-01 ENCOUNTER — HOME CARE VISIT (OUTPATIENT)
Facility: HOME HEALTH | Age: 89
End: 2024-04-01
Payer: MEDICARE

## 2024-04-01 PROCEDURE — 0651 HSPC ROUTINE HOME CARE

## 2024-04-01 PROCEDURE — G0299 HHS/HOSPICE OF RN EA 15 MIN: HCPCS

## 2024-04-02 ENCOUNTER — HOME CARE VISIT (OUTPATIENT)
Facility: HOME HEALTH | Age: 89
End: 2024-04-02
Payer: MEDICARE

## 2024-04-02 VITALS
SYSTOLIC BLOOD PRESSURE: 180 MMHG | RESPIRATION RATE: 22 BRPM | HEART RATE: 80 BPM | DIASTOLIC BLOOD PRESSURE: 102 MMHG | TEMPERATURE: 98 F | OXYGEN SATURATION: 95 %

## 2024-04-02 PROCEDURE — G0156 HHCP-SVS OF AIDE,EA 15 MIN: HCPCS

## 2024-04-02 PROCEDURE — 0651 HSPC ROUTINE HOME CARE

## 2024-04-02 ASSESSMENT — ENCOUNTER SYMPTOMS
PAIN LOCATION - PAIN QUALITY: SHARP
BOWEL INCONTINENCE: 1

## 2024-04-02 NOTE — HOSPICE
Daughter, Maddie, was feeding patient yogurt. Patient was swallowing yogurt well but she choked on water that was given. Daughter, Sarah, had called because she thought the patient's catheter was occluded. There was thick, sediment in the cath tubing. Catheter flushed with N.S. Approximately 100ml of cloudy, yellow urine with sediment came out. Patient grimaced when flush was placed in the bladder. Sarah stated the patient has been occasionally grimacing in pain and grabbing toward her groin. Explained that she may be having bladder spasms. Urine was malodorous. New catheter bag applied to catheter. Patient had not had any pain meds in over 24 hrs but was still very sleepy. Patient rolled onto side with the help of Sarah. Patient cleaned of a small amt of soft stool. No redness or open areas of skin seen. V.S. 180/102; pulse 80 irreg.; resp 22; O2 sat 95% RA; temp 98.0  Supplies ordered

## 2024-04-03 PROCEDURE — 0651 HSPC ROUTINE HOME CARE

## 2024-04-04 PROCEDURE — 0651 HSPC ROUTINE HOME CARE

## 2024-04-05 ENCOUNTER — HOME CARE VISIT (OUTPATIENT)
Facility: HOME HEALTH | Age: 89
End: 2024-04-05
Payer: MEDICARE

## 2024-04-05 VITALS
RESPIRATION RATE: 18 BRPM | OXYGEN SATURATION: 96 % | SYSTOLIC BLOOD PRESSURE: 142 MMHG | TEMPERATURE: 97.3 F | DIASTOLIC BLOOD PRESSURE: 90 MMHG | HEART RATE: 68 BPM

## 2024-04-05 PROCEDURE — 2500000001 HSPC NON INJECTABLE MED

## 2024-04-05 PROCEDURE — G0156 HHCP-SVS OF AIDE,EA 15 MIN: HCPCS

## 2024-04-05 PROCEDURE — G0299 HHS/HOSPICE OF RN EA 15 MIN: HCPCS

## 2024-04-05 PROCEDURE — 0651 HSPC ROUTINE HOME CARE

## 2024-04-05 ASSESSMENT — ENCOUNTER SYMPTOMS
BOWEL INCONTINENCE: 1
STOOL DESCRIPTION: SOFT

## 2024-04-05 NOTE — HOSPICE
Patient was awake at this visit for part of the time. She denied any pain and would occasionally answer easy questions then fall back asleep. Patient is eating baby food and other pureed food well. She chokes on thin liquids. She has an occasional moist cough but her lungs were clear bilat. When she would fall asleep, she would have periods of apnea lasting 20-30 seconds. Patient was rolled to look at sacrum. She had a small, soft BM. Patient cleaned of BM. Her sacral area was very dark like the beginning of a DTI. Daughters state she will not stay on her side. She always lies on her back. She also had urine that had drained out onto the chux and sheets. The urine was very strong smelling. Attempted to flush catheter with 100ml of NS, but fluid came out vaginally. Old catheter removed. New 16fr Das catheter placed using sterile technique. DaughterMaddie assisted by holding patient's leg and flashlight. Patient tolerated placement well. V.S. 142/90; pulse 68 irreg.; O2 sat 96% RA; resp 18 irreg.; temp 97.3.

## 2024-04-06 PROCEDURE — 0651 HSPC ROUTINE HOME CARE

## 2024-04-07 ENCOUNTER — HOME CARE VISIT (OUTPATIENT)
Age: 89
End: 2024-04-07
Payer: MEDICARE

## 2024-04-07 ENCOUNTER — HOME CARE VISIT (OUTPATIENT)
Facility: HOME HEALTH | Age: 89
End: 2024-04-07
Payer: MEDICARE

## 2024-04-07 VITALS
RESPIRATION RATE: 18 BRPM | OXYGEN SATURATION: 97 % | HEART RATE: 87 BPM | SYSTOLIC BLOOD PRESSURE: 119 MMHG | DIASTOLIC BLOOD PRESSURE: 99 MMHG

## 2024-04-07 PROCEDURE — 0651 HSPC ROUTINE HOME CARE

## 2024-04-07 PROCEDURE — G0299 HHS/HOSPICE OF RN EA 15 MIN: HCPCS

## 2024-04-08 ENCOUNTER — HOME CARE VISIT (OUTPATIENT)
Age: 89
End: 2024-04-08
Payer: MEDICARE

## 2024-04-08 PROCEDURE — G0299 HHS/HOSPICE OF RN EA 15 MIN: HCPCS

## 2024-04-09 ENCOUNTER — HOME CARE VISIT (OUTPATIENT)
Facility: HOME HEALTH | Age: 89
End: 2024-04-09
Payer: MEDICARE

## 2024-04-09 ENCOUNTER — HOME CARE VISIT (OUTPATIENT)
Age: 89
End: 2024-04-09
Payer: MEDICARE

## 2024-04-09 PROCEDURE — G0156 HHCP-SVS OF AIDE,EA 15 MIN: HCPCS

## 2024-04-09 PROCEDURE — G0300 HHS/HOSPICE OF LPN EA 15 MIN: HCPCS

## 2024-04-09 NOTE — HOSPICE
PRN visit made for Das leakage.  When pt turned and repositioned, pt was dry, no leaking from catheter.  10 ml of NS added to catheter balloon, pt cleaned of BM smear and turned to right side.  Das was changed yesterday. reminded daughters to call hospice for any needs.

## 2024-04-10 VITALS — DIASTOLIC BLOOD PRESSURE: 120 MMHG | RESPIRATION RATE: 20 BRPM | HEART RATE: 83 BPM | SYSTOLIC BLOOD PRESSURE: 170 MMHG

## 2024-04-10 NOTE — HOSPICE
Patient resting quietly in bed, denies pain when asked. Daughter reports sotelo leaking again. urine in tubing, underpad wet due to leaking. sotelo last changed on Sunday. Call place to Austin SHARPE to review  status. Order for Ditropan 5mg three times daily prn for bladder spasms. Reviewed new medication and instruction with both daughter. Patient with eyes closed at time during visit when not engaged. Assessment completed. No other concerns voiced by caregivers. Encourage caregivers to call with any change in status  NEW MEDICATION INITIATION DOCUMENTATION:  Consulted AT MD to report change in patient status: yes, Austin SHARPE  Obtained Order from Provider for initiation of Symptom relief medication / other medication needed:yes, Ditropan   Documentation completed in Telephone/Visit Note in [facility]  Reason medication is being initiated:bladder spasms  MD / Provider name consulted re: change in status / initiation of new medication:Ditropan 5mg 3 times daily prn  New Symptom(s): leaking sotelo  New Order(s): Ditropan 5mg 3 times daily prn  Name of person being taught: Daughters/Sukumar  Instructions given: yes, both daughter verbalize understanding of instructions  Side Effects taught:yes, nausea/vomiting  Response to teaching: caregivers verbalize understanding

## 2024-04-10 NOTE — HOSPICE
In person visit scheduled on this day d/t pt w/swallowing difficulty. Thickened liquids recommended, however family has choosen to provide mostly thin liquids despite pt showing s/s of intolerance as evidenced by coughing while drinking. Family is providing a soft to pureed diet. During hospitalization in March, swallow study was completed with recommendations for NPO after pt was unable to safely tolerate all textures and liquids. Due to family decision to proceed with hospice, pt was cleared to start puree diet w/nectar thick liquids (i.e. \"comfort foods\").     RD sent text message reminder to pt's daughter this morning to confirm appointment. Daughter responded that she was no longer interested in keeping appointment. Daughter stated that patient education materials that RD shared w/CM to give to family were helpful and she did not think an in person visit would provide any additional benefit. CM made aware of visit cancellation.

## 2024-04-11 ENCOUNTER — HOME CARE VISIT (OUTPATIENT)
Facility: HOME HEALTH | Age: 89
End: 2024-04-11
Payer: MEDICARE

## 2024-04-11 VITALS — RESPIRATION RATE: 18 BRPM

## 2024-04-11 PROCEDURE — G0299 HHS/HOSPICE OF RN EA 15 MIN: HCPCS

## 2024-04-11 ASSESSMENT — ENCOUNTER SYMPTOMS
SKIN LESIONS: 1
BOWEL INCONTINENCE: 1

## 2024-04-11 NOTE — HOSPICE
Daughter had called hospice triage because the patient's Das cath was leaking. Dr. Rivas and Austin Castro NP notified to ask for guidance. Dr. Rivas suggested inserting a catheter with a 20-30ml balloon. No such catheter could be found, so one was ordered thru [facility]. On arrival for visit. Patient's sheets were wet. Present catheter removed after 18ml of N.S. removed from the balloon. With assistance from daughterLilly. Patient's sheets/chux were changed and a fresh brief applied. Daughter made aware that catheter has been ordered. Daughter stated that patient ate very well yesterday and ate some today also. She fell back asleep after the catheter was out.

## 2024-04-12 ENCOUNTER — HOME CARE VISIT (OUTPATIENT)
Facility: HOME HEALTH | Age: 89
End: 2024-04-12
Payer: MEDICARE

## 2024-04-12 VITALS
RESPIRATION RATE: 22 BRPM | TEMPERATURE: 97.6 F | HEART RATE: 68 BPM | DIASTOLIC BLOOD PRESSURE: 64 MMHG | OXYGEN SATURATION: 96 % | SYSTOLIC BLOOD PRESSURE: 120 MMHG

## 2024-04-12 PROCEDURE — G0299 HHS/HOSPICE OF RN EA 15 MIN: HCPCS

## 2024-04-12 PROCEDURE — G0156 HHCP-SVS OF AIDE,EA 15 MIN: HCPCS

## 2024-04-12 ASSESSMENT — ENCOUNTER SYMPTOMS: BOWEL INCONTINENCE: 1

## 2024-04-12 NOTE — HOSPICE
Patient was sleeping but aroused with touch. She would answer yes/no questions and fall back asleep. Daughter stated she has not had any pain meds in 2 days. Daughter, Sarah, stated the patient is only awake about 3 hours total a day. Her lungs were clear bilat. Sarah stated the patient ate a good breakfast today. Reddened area under her breasts are clear. She has some excoriation on ther buttocks. Zinc cream applied. Patient's brief changed. Her urine was strong smelling. V.S. 120/64; pulse 68; resp 22; temp 97.6; O2 sat 96% RA.

## 2024-04-15 ENCOUNTER — HOME CARE VISIT (OUTPATIENT)
Facility: HOME HEALTH | Age: 89
End: 2024-04-15
Payer: MEDICARE

## 2024-04-15 VITALS
HEART RATE: 67 BPM | SYSTOLIC BLOOD PRESSURE: 146 MMHG | OXYGEN SATURATION: 98 % | RESPIRATION RATE: 18 BRPM | DIASTOLIC BLOOD PRESSURE: 79 MMHG

## 2024-04-15 PROCEDURE — G0300 HHS/HOSPICE OF LPN EA 15 MIN: HCPCS

## 2024-04-15 ASSESSMENT — ENCOUNTER SYMPTOMS: BOWEL INCONTINENCE: 1

## 2024-04-15 NOTE — HOSPICE
routine.  On arrival patient lying in bed with eyes closed.  She opened eyes to name.  Daughters Maddie, Sarah, and Graciela present during visit.  Sarah concerned about the \"hole\" in the mattress that patient sinks into.  Discussed changing air mattress; caregiver agreed.  Sarah requested not replacing catheter.  She feels her mother is comfortable without the catheter.  Discussed discontinuing the ditropan for bladder spasms with Austin Castro NP.  New order given to d/c ditropan.  Soiled diaper changed.  Patient had small bm.  Wound care provided to excoriated buttocks.  Patient repositioned with pillows.  triage to order: white chux, wipes and 7x7 sacral foams, and air mattress.

## 2024-04-16 ENCOUNTER — HOME CARE VISIT (OUTPATIENT)
Facility: HOME HEALTH | Age: 89
End: 2024-04-16
Payer: MEDICARE

## 2024-04-16 PROCEDURE — G0156 HHCP-SVS OF AIDE,EA 15 MIN: HCPCS

## 2024-04-18 ENCOUNTER — HOME CARE VISIT (OUTPATIENT)
Facility: HOME HEALTH | Age: 89
End: 2024-04-18
Payer: MEDICARE

## 2024-04-18 PROCEDURE — G0155 HHCP-SVS OF CSW,EA 15 MIN: HCPCS

## 2024-04-19 ENCOUNTER — HOME CARE VISIT (OUTPATIENT)
Facility: HOME HEALTH | Age: 89
End: 2024-04-19
Payer: MEDICARE

## 2024-04-19 VITALS
TEMPERATURE: 97.1 F | RESPIRATION RATE: 18 BRPM | SYSTOLIC BLOOD PRESSURE: 151 MMHG | OXYGEN SATURATION: 95 % | DIASTOLIC BLOOD PRESSURE: 74 MMHG | HEART RATE: 80 BPM

## 2024-04-19 PROCEDURE — G0156 HHCP-SVS OF AIDE,EA 15 MIN: HCPCS

## 2024-04-19 PROCEDURE — G0299 HHS/HOSPICE OF RN EA 15 MIN: HCPCS

## 2024-04-19 ASSESSMENT — ENCOUNTER SYMPTOMS
SKIN LESIONS: 1
BOWEL INCONTINENCE: 1
PAIN LOCATION - PAIN QUALITY: ACHY
HEMOPTYSIS: 0
CONTUSION: 1

## 2024-04-19 NOTE — HOSPICE
Arrived to patient lying in bed, had just been bathed by our HHA Maritza Ugarte. Supervisory visit completed. Paitent rests with eyes closed, responds to verbal stimuli and follow commands. Oriented x1 today. Reports had pain yesterday, daughters medicated with Hydromorphone and was effective. Denies SOB. Note Bilat LE edema, 3+. Last BM 4/19. Incontinent of bowel and bladder and bedbound. Left side noted to be weaker than right with L sided foot drop noted. Speech is slurred, delayed and garbled at times. Eating a pureed diet and only eating about 25% of 3 meals. Sleeping 20 hours a day. Scattered brusing all extremities. Tremors noted to upper extremities. No medication needs. Supplies shampoo caps, lotion, and wipes ordered via [employer], Conf#[id number] . Daughters aware of hospice 24hr availability and next planned visit. No questions/concerns at this time.

## 2024-04-19 NOTE — HOSPICE
LMSW arrived at the patient's home to complete a Psychosocial Initial Assessment visit for [facility]. The LMSW was greeted at the man door by the patient's DTR Maddie, and another DTR Lilly was present. The home was clean and clutter free. The patient was a 89 y/o  female with a [facility] Dx. Cerebrovascular accident (CVA), unspecified mechanism. The patient was received sleeping on her bed comfortably throughout this visit. Maddie and Lilly were the POC. Lilly reported that the patient's appetite has declined from last month. Patient sleep has increased to 22-23hours in a 24-hour period. Patient was bedbound and total care. The patient's mood was said to be good.  Patient was said to be alert and oriented x 2. Lilly and Maddie requested that the HHA visit three times a week. Lilly stated that she will consider hiring a HHA Agencies. LMSW provided emotional support and supportive counseling related to EOL care.

## 2024-04-22 ENCOUNTER — HOME CARE VISIT (OUTPATIENT)
Facility: HOME HEALTH | Age: 89
End: 2024-04-22
Payer: MEDICARE

## 2024-04-22 PROCEDURE — G0156 HHCP-SVS OF AIDE,EA 15 MIN: HCPCS

## 2024-04-23 ENCOUNTER — HOME CARE VISIT (OUTPATIENT)
Facility: HOME HEALTH | Age: 89
End: 2024-04-23
Payer: MEDICARE

## 2024-04-23 VITALS
SYSTOLIC BLOOD PRESSURE: 148 MMHG | RESPIRATION RATE: 20 BRPM | OXYGEN SATURATION: 96 % | HEART RATE: 96 BPM | DIASTOLIC BLOOD PRESSURE: 92 MMHG | TEMPERATURE: 97.8 F

## 2024-04-23 PROCEDURE — G0299 HHS/HOSPICE OF RN EA 15 MIN: HCPCS

## 2024-04-23 ASSESSMENT — ENCOUNTER SYMPTOMS
PAIN LOCATION - PAIN QUALITY: ACHE
SKIN LESIONS: 1
BOWEL INCONTINENCE: 1
CONTUSION: 1

## 2024-04-24 ENCOUNTER — HOME CARE VISIT (OUTPATIENT)
Facility: HOME HEALTH | Age: 89
End: 2024-04-24
Payer: MEDICARE

## 2024-04-24 PROCEDURE — G0156 HHCP-SVS OF AIDE,EA 15 MIN: HCPCS

## 2024-04-24 NOTE — HOSPICE
Patient was sleeping mid-morning. Daughter, Lilly, states she sleeps approximately 22 hrs a day. She awakened to light touch and saying her name. She initially denied any pain, but after she was rolled to change her brief, she c/o back pain. Lilly gave her 0.5mg of hydromorphone. Patient's lungs were clear in her upper lobes. She had 2+ non-pitting edema in both feet and ankles. She had noticable tremors in her arms and upper body.  Lilly stated the patient is only eating a few bites at each meal but is drinking well. V.S.148/92; Pulse 96 irreg.; O2 sat 96% RA; resp 20; temp 97.8

## 2024-04-26 ENCOUNTER — HOME CARE VISIT (OUTPATIENT)
Facility: HOME HEALTH | Age: 89
End: 2024-04-26
Payer: MEDICARE

## 2024-04-26 VITALS — HEART RATE: 92 BPM | SYSTOLIC BLOOD PRESSURE: 152 MMHG | DIASTOLIC BLOOD PRESSURE: 94 MMHG | RESPIRATION RATE: 18 BRPM

## 2024-04-26 PROCEDURE — G0300 HHS/HOSPICE OF LPN EA 15 MIN: HCPCS

## 2024-04-26 PROCEDURE — G0156 HHCP-SVS OF AIDE,EA 15 MIN: HCPCS

## 2024-04-26 ASSESSMENT — ENCOUNTER SYMPTOMS: BOWEL INCONTINENCE: 1

## 2024-04-26 NOTE — HOSPICE
Routine visit.  Prior to visit informed both ALISE Bhardwaj and SUMMER Bal that daughter Maddie had concerns about her sister Sarah drinking daily from [id number].  On arrival both Maddie and Sarah present during visit.  Patient did not appear to have pain. New (R) heel redness noted.  No broken skin or open areas.  Advised to use barrier cream daily.  Hells are being floated.  No further issues at this time.  Ordered white chux, wipes and zinc cream.

## 2024-04-29 ENCOUNTER — HOME CARE VISIT (OUTPATIENT)
Facility: HOME HEALTH | Age: 89
End: 2024-04-29
Payer: MEDICARE

## 2024-04-29 PROCEDURE — G0156 HHCP-SVS OF AIDE,EA 15 MIN: HCPCS

## 2024-04-30 ENCOUNTER — HOME CARE VISIT (OUTPATIENT)
Age: 89
End: 2024-04-30
Payer: MEDICARE

## 2024-04-30 VITALS
HEART RATE: 90 BPM | SYSTOLIC BLOOD PRESSURE: 86 MMHG | OXYGEN SATURATION: 99 % | RESPIRATION RATE: 18 BRPM | DIASTOLIC BLOOD PRESSURE: 32 MMHG

## 2024-04-30 PROCEDURE — G0299 HHS/HOSPICE OF RN EA 15 MIN: HCPCS

## 2024-04-30 ASSESSMENT — ENCOUNTER SYMPTOMS
BOWEL INCONTINENCE: 1
STOOL DESCRIPTION: SOFT

## 2024-04-30 NOTE — HOSPICE
Patient lethargic, wakes briefly to touch, answering a few yes/no questions then falling back to sleep during visit. VSS. No signs or reports of uncontrolled pain, dyspnea, or other symptoms. Per daughter Lilly, patient is sleeping all day and night, no longer eating or drinking. Hospice RNs cleaned and re-positioned patient, provided education to daughter re: end of life symptoms and management. Supplies ordered.

## 2024-05-01 ENCOUNTER — HOME CARE VISIT (OUTPATIENT)
Facility: HOME HEALTH | Age: 89
End: 2024-05-01
Payer: MEDICARE

## 2024-05-01 PROCEDURE — G0156 HHCP-SVS OF AIDE,EA 15 MIN: HCPCS

## 2024-05-02 ENCOUNTER — HOME CARE VISIT (OUTPATIENT)
Facility: HOME HEALTH | Age: 89
End: 2024-05-02
Payer: MEDICARE

## 2024-05-02 VITALS
RESPIRATION RATE: 22 BRPM | DIASTOLIC BLOOD PRESSURE: 40 MMHG | HEART RATE: 94 BPM | SYSTOLIC BLOOD PRESSURE: 84 MMHG | TEMPERATURE: 97 F | OXYGEN SATURATION: 99 %

## 2024-05-02 PROCEDURE — G0299 HHS/HOSPICE OF RN EA 15 MIN: HCPCS

## 2024-05-02 ASSESSMENT — ENCOUNTER SYMPTOMS: BOWEL INCONTINENCE: 1

## 2024-05-02 NOTE — HOSPICE
Patient was sleeping. Her upper body tremors had decreased. She awakened easily to touch. She attempted to answer questions but most of the time her words could not be heard or understood. Daughter, Lilly,said she had one bite of yogurt and a sip of water for breakfast. Patient had irregular respirations with periods of apnea lasting approx 20 seconds. Her lungs were clear in the upper lobes and diminished in the bases. She did not appear to be in any pain. She had not had pain meds for 48 hrs. Patient was cleaned of a soiled brief. No open areas of skin seen. All 3 daughters were present for this visit. V.S. 84/40; pulse 94 irreg.; resp 22; O2 sat 99% RA; temp 97.0
Stable

## 2024-05-03 ENCOUNTER — HOME CARE VISIT (OUTPATIENT)
Facility: HOME HEALTH | Age: 89
End: 2024-05-03
Payer: MEDICARE

## 2024-05-03 VITALS — RESPIRATION RATE: 18 BRPM | HEART RATE: 68 BPM | DIASTOLIC BLOOD PRESSURE: 41 MMHG | SYSTOLIC BLOOD PRESSURE: 70 MMHG

## 2024-05-03 PROCEDURE — G0299 HHS/HOSPICE OF RN EA 15 MIN: HCPCS

## 2024-05-03 ASSESSMENT — ENCOUNTER SYMPTOMS: BOWEL INCONTINENCE: 1

## 2024-05-03 NOTE — HOSPICE
Patient was receiving bath from hospice aide. Patient was awake but lethargic. She denied any pain. All other speech could not be understood. Patient had no open areas of skin. Her upper lung fields were clear and lower lobes were diminished. Patient had a couple sips of water this morning. Patient was cleaned of a small BM with a small amt of blood. V.S> BP 70/41; pulse 68 irreg.; resp 18; unable to obtain pulse ox reading.

## 2024-05-05 ENCOUNTER — HOME CARE VISIT (OUTPATIENT)
Facility: HOME HEALTH | Age: 89
End: 2024-05-05
Payer: MEDICARE

## 2024-05-05 VITALS — SYSTOLIC BLOOD PRESSURE: 128 MMHG | DIASTOLIC BLOOD PRESSURE: 95 MMHG | RESPIRATION RATE: 18 BRPM | HEART RATE: 56 BPM

## 2024-05-05 PROCEDURE — G0300 HHS/HOSPICE OF LPN EA 15 MIN: HCPCS

## 2024-05-07 NOTE — HOSPICE
LMSW arrived at the patient's home to complete a routine visit for [facility]. The LMSW was greeted at the man door by the patient's DTR Maddie, and another DTR Lilly was present. The home was clean and clutter free. The patient was a 89 y/o  female with a [facility] Dx. Cerebrovascular accident (CVA), unspecified mechanism. The patient was received sleeping on her bed comfortably throughout this visit. Maddie and Lilly were the POC. Lilly reported that the patient did not have anything to eat since last Friday (5 days ago). Patient woke today briefly for a sip of water. Patient sleep has increased to 23.75 hours in a 24-hour period. Patient was bedbound and total care. Lilly stated that she will consider hiring a A Agencies. ESTERSW provided emotional support and supportive counseling related to EOL care.

## 2024-05-08 NOTE — HOSPICE
Patient was barely awake. She was more alert than she was the previous day. Daughter, Maddie, stated she said a couple words. Patient appeared very comfortable. Maddie stated she has not had any medication for 4 days. Patient had positive bowel sounds. She had dark urine in her brief. Incontinent care done. She also had some loose, bloody stool. Unable to feel a radial pulse. Apical pulse was 74 and irregular.  Unable to obtain a blood pressure reading. Skin was cool to touch.

## 2024-05-08 NOTE — HOSPICE
Patient was sleeping but was awakened by loud voice and touch. She was too weak to speak but would shake her head yes or no. She denied any pain. She had irregular respirations that were unlabored. She still had upper body tremors that did not seem to bother her. She had not had any medicine in over 24 hours. Mouth care done with oral swab because her mouth was very dry. Encouraged family to do that often. Daughter, Lilly, stated her mother did not urinate at all yesterday. When changing patient's brief, she urinated a moderate amt of urine with dark blood in the urine. She also had a small BM that had blood mixed it. Patient has not received her Xarelto in several days. Unable to obtain a blood pressure or pulse ox reading. No radial pulse felt. No mottleing seen on extremities. No open areas of skin seen.  Wipes and chux dropped off.

## 2024-05-08 NOTE — HOSPICE
Patient resting quietly, no nonverbal signs of pain. Patient with periods of increase RR 20--22. Daughters Lilly and Maddie present for visit, reports no signs of pain. They reported patient with rectal bleeding this am. Assessment completed. Encourage caregiver to call with any questions or concerns

## 2024-05-10 NOTE — HOSPICE
Patient lying in bed upon arrival for visit; patient's three daughters present.  Patient is lethargic; shakes head 'no' when asked if having any pain.  Respirations irregular and shallow on room air.  Daughters report that patient has had no medications since 5/4.  Minimal to zero intake this week.  Patient was incontinent of bowel and bladder.  Incontinence care performed and brief and chux changed.  Unable to auscultate blood pressure or obtain pulse oximetry.  Pedal pulses absent.  End of life signs/symptoms and comfort measures reviewed with patient's daughters.  Daughters inquiring about supply order; will notify RNCM.  Reinforced hospice 24/7 for any concerns or change in patient's condition.

## 2024-05-11 NOTE — HOSPICE
Patient had eyes closed but frequently opened them to questions or loud noises. Daughter, Mdadie, stated she had a sip of water last night but got choked and coughed several times through the night. Patient's lungs were clear bilat. Unable to obtain a blood pressure reading or pulse ox reading. Apical pulse was 88 and very irregular. Patient rolled and cleaned of a small amt of bloody stool. Her buttocks were red. A large foam drsg was in place. When patient was asked about pain, she nodded her head yes. Dose of hydromorphone given. No open areas of skin seen.  Supplies brought in from car stock.

## 2024-05-11 NOTE — HOSPICE
Routine SN visit done for assessment/ due to EOL Hospice status 0-7. Upon visit arrival pt. noted lying supine in hospital bed with HOB elevated. Daughters Lilly and Maddie present. Lilly contacted for window of visit arrival time. Pt. was minimally responsive with resp. even and unlabored. Pt. acknowledged generalized pain though declined to accept offered pain medication. Assessment completed (See system review).Brief was changed for small BM with blood/mucous present and pt. pulled up in bed then repositioned and propped with pillows for comfort. No med refills or supplies needed at this time. Family encouraged to call Hospice with any pt. status changes or concerns. Understanding was verbalized.